# Patient Record
Sex: MALE | Race: WHITE | NOT HISPANIC OR LATINO
[De-identification: names, ages, dates, MRNs, and addresses within clinical notes are randomized per-mention and may not be internally consistent; named-entity substitution may affect disease eponyms.]

---

## 2017-06-01 ENCOUNTER — APPOINTMENT (OUTPATIENT)
Dept: SURGERY | Facility: CLINIC | Age: 62
End: 2017-06-01

## 2017-06-01 VITALS
HEIGHT: 69 IN | DIASTOLIC BLOOD PRESSURE: 72 MMHG | BODY MASS INDEX: 30.66 KG/M2 | SYSTOLIC BLOOD PRESSURE: 120 MMHG | HEART RATE: 94 BPM | WEIGHT: 207 LBS

## 2017-06-01 DIAGNOSIS — Z86.79 PERSONAL HISTORY OF OTHER DISEASES OF THE CIRCULATORY SYSTEM: ICD-10-CM

## 2017-06-01 DIAGNOSIS — Z78.9 OTHER SPECIFIED HEALTH STATUS: ICD-10-CM

## 2017-06-01 DIAGNOSIS — Z86.39 PERSONAL HISTORY OF OTHER ENDOCRINE, NUTRITIONAL AND METABOLIC DISEASE: ICD-10-CM

## 2017-06-01 DIAGNOSIS — I70.90 UNSPECIFIED ATHEROSCLEROSIS: ICD-10-CM

## 2017-06-01 PROBLEM — K57.90 DIVERTICULOSIS: Status: RESOLVED | Noted: 2017-06-01 | Resolved: 2017-06-01

## 2017-06-01 PROBLEM — Z86.69 HISTORY OF SCIATICA: Status: RESOLVED | Noted: 2017-06-01 | Resolved: 2017-06-01

## 2017-06-01 PROBLEM — Z87.19 HISTORY OF ESOPHAGEAL REFLUX: Status: RESOLVED | Noted: 2017-06-01 | Resolved: 2017-06-01

## 2017-06-01 PROBLEM — Z87.898 HISTORY OF MULTIPLE PULMONARY NODULES: Status: RESOLVED | Noted: 2017-06-01 | Resolved: 2017-06-01

## 2017-06-01 PROBLEM — I25.10 CAD (CORONARY ARTERY DISEASE): Status: RESOLVED | Noted: 2017-06-01 | Resolved: 2017-06-01

## 2017-06-01 PROBLEM — I25.10 ASHD (ARTERIOSCLEROTIC HEART DISEASE): Status: RESOLVED | Noted: 2017-06-01 | Resolved: 2017-06-01

## 2017-06-01 PROBLEM — I71.2 THORACIC AORTIC ANEURYSM: Status: RESOLVED | Noted: 2017-06-01 | Resolved: 2017-06-01

## 2017-06-01 RX ORDER — CLOPIDOGREL 75 MG/1
75 TABLET, FILM COATED ORAL
Refills: 0 | Status: ACTIVE | COMMUNITY

## 2017-06-02 ENCOUNTER — APPOINTMENT (OUTPATIENT)
Dept: CARDIOTHORACIC SURGERY | Facility: CLINIC | Age: 62
End: 2017-06-02

## 2017-06-02 VITALS
SYSTOLIC BLOOD PRESSURE: 130 MMHG | HEART RATE: 87 BPM | HEIGHT: 69 IN | TEMPERATURE: 98.4 F | OXYGEN SATURATION: 96 % | DIASTOLIC BLOOD PRESSURE: 77 MMHG | BODY MASS INDEX: 30.66 KG/M2 | RESPIRATION RATE: 15 BRPM | WEIGHT: 207 LBS

## 2017-06-02 VITALS — SYSTOLIC BLOOD PRESSURE: 118 MMHG | DIASTOLIC BLOOD PRESSURE: 70 MMHG

## 2017-06-02 DIAGNOSIS — K57.90 DIVERTICULOSIS OF INTESTINE, PART UNSPECIFIED, W/OUT PERFORATION OR ABSCESS W/OUT BLEEDING: ICD-10-CM

## 2017-06-02 DIAGNOSIS — Z82.49 FAMILY HISTORY OF ISCHEMIC HEART DISEASE AND OTHER DISEASES OF THE CIRCULATORY SYSTEM: ICD-10-CM

## 2017-06-02 DIAGNOSIS — I71.2 THORACIC AORTIC ANEURYSM, W/OUT RUPTURE: ICD-10-CM

## 2017-06-02 DIAGNOSIS — Z87.19 PERSONAL HISTORY OF OTHER DISEASES OF THE DIGESTIVE SYSTEM: ICD-10-CM

## 2017-06-02 DIAGNOSIS — Z87.891 PERSONAL HISTORY OF NICOTINE DEPENDENCE: ICD-10-CM

## 2017-06-02 DIAGNOSIS — I25.10 ATHEROSCLEROTIC HEART DISEASE OF NATIVE CORONARY ARTERY W/OUT ANGINA PECTORIS: ICD-10-CM

## 2017-06-02 DIAGNOSIS — Z87.898 PERSONAL HISTORY OF OTHER SPECIFIED CONDITIONS: ICD-10-CM

## 2017-06-02 DIAGNOSIS — Z86.69 PERSONAL HISTORY OF OTHER DISEASES OF THE NERVOUS SYSTEM AND SENSE ORGANS: ICD-10-CM

## 2017-06-02 LAB
24R-OH-CALCIDIOL SERPL-MCNC: 56.6 PG/ML
CALCIUM SERPL-MCNC: 9.7 MG/DL
CALCIUM SERPL-MCNC: 9.7 MG/DL
PARATHYROID HORMONE INTACT: 24 PG/ML

## 2017-06-02 RX ORDER — RAMIPRIL 5 MG/1
5 CAPSULE ORAL DAILY
Refills: 0 | Status: ACTIVE | COMMUNITY

## 2017-06-02 RX ORDER — ATENOLOL 25 MG/1
25 TABLET ORAL DAILY
Refills: 0 | Status: ACTIVE | COMMUNITY

## 2017-06-02 RX ORDER — ASPIRIN 325 MG/1
TABLET, FILM COATED ORAL
Refills: 0 | Status: DISCONTINUED | COMMUNITY
End: 2017-06-02

## 2017-06-02 RX ORDER — ROSUVASTATIN CALCIUM 5 MG/1
TABLET, FILM COATED ORAL AT BEDTIME
Refills: 0 | Status: ACTIVE | COMMUNITY

## 2017-06-02 RX ORDER — METFORMIN HYDROCHLORIDE 1000 MG/1
1000 TABLET, COATED ORAL DAILY
Refills: 0 | Status: ACTIVE | COMMUNITY

## 2017-06-02 RX ORDER — MULTIVITAMIN
TABLET ORAL
Refills: 0 | Status: ACTIVE | COMMUNITY

## 2017-06-02 RX ORDER — COLESEVELAM HYDROCHLORIDE 625 MG/1
625 TABLET, FILM COATED ORAL TWICE DAILY
Refills: 0 | Status: ACTIVE | COMMUNITY

## 2017-06-09 ENCOUNTER — RESULT REVIEW (OUTPATIENT)
Age: 62
End: 2017-06-09

## 2017-12-05 ENCOUNTER — APPOINTMENT (OUTPATIENT)
Dept: SURGERY | Facility: CLINIC | Age: 62
End: 2017-12-05
Payer: COMMERCIAL

## 2017-12-05 PROCEDURE — 99213 OFFICE O/P EST LOW 20 MIN: CPT

## 2018-06-06 ENCOUNTER — APPOINTMENT (OUTPATIENT)
Dept: CT IMAGING | Facility: CLINIC | Age: 63
End: 2018-06-06

## 2018-06-06 ENCOUNTER — OUTPATIENT (OUTPATIENT)
Dept: OUTPATIENT SERVICES | Facility: HOSPITAL | Age: 63
LOS: 1 days | End: 2018-06-06
Payer: COMMERCIAL

## 2018-06-06 DIAGNOSIS — I71.2 THORACIC AORTIC ANEURYSM, WITHOUT RUPTURE: ICD-10-CM

## 2018-06-06 PROCEDURE — 82565 ASSAY OF CREATININE: CPT

## 2018-06-06 PROCEDURE — 71275 CT ANGIOGRAPHY CHEST: CPT

## 2018-06-06 PROCEDURE — 74174 CTA ABD&PLVS W/CONTRAST: CPT

## 2018-06-06 PROCEDURE — 71275 CT ANGIOGRAPHY CHEST: CPT | Mod: 26

## 2018-06-06 PROCEDURE — 74174 CTA ABD&PLVS W/CONTRAST: CPT | Mod: 26

## 2018-06-08 ENCOUNTER — APPOINTMENT (OUTPATIENT)
Dept: CARDIOTHORACIC SURGERY | Facility: CLINIC | Age: 63
End: 2018-06-08
Payer: COMMERCIAL

## 2018-06-08 VITALS
HEART RATE: 77 BPM | TEMPERATURE: 98.3 F | WEIGHT: 215 LBS | OXYGEN SATURATION: 94 % | DIASTOLIC BLOOD PRESSURE: 81 MMHG | HEIGHT: 69 IN | RESPIRATION RATE: 15 BRPM | SYSTOLIC BLOOD PRESSURE: 135 MMHG | BODY MASS INDEX: 31.84 KG/M2

## 2018-06-08 DIAGNOSIS — I71.2 THORACIC AORTIC ANEURYSM, W/OUT RUPTURE: ICD-10-CM

## 2018-06-08 PROCEDURE — 99214 OFFICE O/P EST MOD 30 MIN: CPT

## 2018-06-08 RX ORDER — BRIMONIDINE TARTRATE 1 MG/ML
0.1 SOLUTION/ DROPS OPHTHALMIC
Qty: 20 | Refills: 0 | Status: ACTIVE | COMMUNITY
Start: 2018-01-20

## 2018-06-08 RX ORDER — OXYCODONE HYDROCHLORIDE AND ACETAMINOPHEN 10; 325 MG/1; MG/1
10-325 TABLET ORAL EVERY 4 HOURS
Refills: 0 | Status: DISCONTINUED | COMMUNITY
End: 2018-06-08

## 2018-06-08 RX ORDER — METFORMIN ER 500 MG 500 MG/1
500 TABLET ORAL
Qty: 180 | Refills: 0 | Status: DISCONTINUED | COMMUNITY
Start: 2018-04-01

## 2018-06-08 RX ORDER — GLUC/MSM/COLGN2/HYAL/ANTIARTH3 375-375-20
TABLET ORAL TWICE DAILY
Refills: 0 | Status: DISCONTINUED | COMMUNITY
End: 2018-06-08

## 2018-12-18 ENCOUNTER — APPOINTMENT (OUTPATIENT)
Dept: SURGERY | Facility: CLINIC | Age: 63
End: 2018-12-18
Payer: COMMERCIAL

## 2018-12-18 DIAGNOSIS — E04.1 NONTOXIC SINGLE THYROID NODULE: ICD-10-CM

## 2018-12-18 PROCEDURE — 99213 OFFICE O/P EST LOW 20 MIN: CPT

## 2018-12-18 PROCEDURE — 36415 COLL VENOUS BLD VENIPUNCTURE: CPT

## 2018-12-19 LAB
T3 SERPL-MCNC: 127 NG/DL
T4 FREE SERPL-MCNC: 1.4 NG/DL
THYROGLOB AB SERPL-ACNC: <20 IU/ML
THYROPEROXIDASE AB SERPL IA-ACNC: <10 IU/ML
TSH SERPL-ACNC: 0.94 UIU/ML

## 2019-01-08 ENCOUNTER — OTHER (OUTPATIENT)
Age: 64
End: 2019-01-08

## 2019-05-02 ENCOUNTER — APPOINTMENT (OUTPATIENT)
Dept: ORTHOPEDIC SURGERY | Facility: CLINIC | Age: 64
End: 2019-05-02
Payer: COMMERCIAL

## 2019-05-02 VITALS
HEIGHT: 69 IN | DIASTOLIC BLOOD PRESSURE: 75 MMHG | SYSTOLIC BLOOD PRESSURE: 143 MMHG | WEIGHT: 234 LBS | HEART RATE: 86 BPM | BODY MASS INDEX: 34.66 KG/M2

## 2019-05-02 PROCEDURE — 99203 OFFICE O/P NEW LOW 30 MIN: CPT | Mod: 25

## 2019-05-02 PROCEDURE — 73130 X-RAY EXAM OF HAND: CPT | Mod: 50

## 2019-05-02 PROCEDURE — 20600 DRAIN/INJ JOINT/BURSA W/O US: CPT | Mod: FA

## 2019-05-02 RX ORDER — ASPIRIN 325 MG/1
325 TABLET, FILM COATED ORAL TWICE DAILY
Refills: 0 | Status: DISCONTINUED | COMMUNITY
End: 2019-05-02

## 2019-05-02 RX ORDER — UBIDECARENONE/VIT E ACET 100MG-5
CAPSULE ORAL
Refills: 0 | Status: ACTIVE | COMMUNITY

## 2019-05-02 RX ORDER — ACETAMINOPHEN 500 MG/1
500 TABLET ORAL
Refills: 0 | Status: ACTIVE | COMMUNITY

## 2019-05-02 RX ORDER — ASPIRIN 81 MG
81 TABLET, DELAYED RELEASE (ENTERIC COATED) ORAL
Refills: 0 | Status: ACTIVE | COMMUNITY

## 2019-05-02 RX ORDER — FAMOTIDINE 10 MG/1
TABLET, FILM COATED ORAL TWICE DAILY
Refills: 0 | Status: DISCONTINUED | COMMUNITY
End: 2019-05-02

## 2019-05-02 NOTE — DISCUSSION/SUMMARY
[FreeTextEntry1] : He has findings consistent with Bilateral hand pain, secondary to moderate basal joint arthritis.\par \par I had a discussion regarding today's visit, the diagnosis, and treatment options / recommendations.  At this time, he agreed to proceed with a cortisone injection on the left side.  He does understand that this may not provide her with long-term relief.  In addition, he understands that this will likely elevate his blood sugars in the short term.\par \par The patient has agreed to this plan of management and has expressed full understanding.  All questions were fully answered to the patient's satisfaction.\par \par Over 50% of the time spent with the patient was on counseling the patient on the above diagnosis, treatment plan and prognosis.

## 2019-05-02 NOTE — PROCEDURE
[FreeTextEntry1] :  - After a discussion of risks and benefits, the patient agreed to proceed with a cortisone injection.  \par -  Side Injected: Left thumb CMC joint.\par -  Medications injected: 0.5cc of 1% Lidocaine and 1cc of 40mg of Depomedrol, using sterile technique.\par -  Patient tolerated procedure well, without complications.\par -  Patient noted immediate relief of the symptoms, secondary to the anesthetic effects of the injection.\par -  Patient was told that the pain may worsen for a day or two, and should then begin to improve.\par -  Instructions: The patient was instructed on the use of ice, anti-inflammatory agents, or Tylenol, and activity modification.\par -  Follow-up: Within 2-3 weeks to assess the response to the injection.\par

## 2019-05-02 NOTE — HISTORY OF PRESENT ILLNESS
[Right] : right hand dominant [FreeTextEntry1] : He comes in today for evaluation of bilateral hand pain.  He has had symptoms for several years.  He is most symptomatic at the base of the thumbs, more so on the right side.  He is recovering from bilateral total knee replacements.\par \par - He was referred by his primary care physician, Dr. Kory Lynn\par \par - He has a past medical history, which includes cardiac disease, and type 2 diabetes.  He takes a number of medications including Plavix, and aspirin, and metformin.

## 2019-05-02 NOTE — CONSULT LETTER
[Dear  ___] : Dear  [unfilled], [Consult Letter:] : I had the pleasure of evaluating your patient, [unfilled]. [Please see my note below.] : Please see my note below. [Consult Closing:] : Thank you very much for allowing me to participate in the care of this patient.  If you have any questions, please do not hesitate to contact me. [Sincerely,] : Sincerely, [FreeTextEntry3] : Amado Cannon M.D.\par Surgery of the Hand & Upper Extremity\par Orthopaedic Surgery\par Chief, Hand Service, [employer]\par Director, Hand Service, [facility]\par  of Orthopedic Surgery, [facility] at hospitals/[facility] \par [facility]\par \par Email: [email or web address] Office Phone: [phone]\par \par

## 2019-05-02 NOTE — PHYSICAL EXAM
[de-identified] : - Constitutional: This is a male, in no obvious distress.  \par - Psych: Patient is alert and oriented to person, place and time.  Patient has a normal mood and affect.\par - Cardiovascular: Normal pulses throughout the upper extremities.  \par - Neuro: Strength and sensation are intact throughout the upper extremities.  Patient has normal coordination.\par - Skin: No rashes, lesions, or other abnormalities are noted in the upper extremities.\par \par ---\par \par Examination of both hands demonstrates tenderness along the CMC joints of both thumbs.  There is mild swelling.  There is no instability.  There is no evidence of a trigger finger or de Quervain's tendinitis.  Provocative signs for carpal tunnel syndrome are negative.  He is neurovascularly intact distally. [de-identified] : \par AP, lateral, and oblique radiographs of both hands demonstrate moderate left greater than right thumb basal joint arthritis.

## 2019-05-16 ENCOUNTER — TRANSCRIPTION ENCOUNTER (OUTPATIENT)
Age: 64
End: 2019-05-16

## 2019-05-22 PROBLEM — M18.11 PRIMARY OSTEOARTHRITIS OF FIRST CARPOMETACARPAL JOINT OF RIGHT HAND: Status: ACTIVE | Noted: 2019-05-02

## 2019-05-22 PROBLEM — M18.12 PRIMARY OSTEOARTHRITIS OF FIRST CARPOMETACARPAL JOINT OF LEFT HAND: Status: ACTIVE | Noted: 2019-05-02

## 2019-05-24 ENCOUNTER — APPOINTMENT (OUTPATIENT)
Dept: ORTHOPEDIC SURGERY | Facility: CLINIC | Age: 64
End: 2019-05-24
Payer: COMMERCIAL

## 2019-05-24 VITALS — BODY MASS INDEX: 34.66 KG/M2 | HEIGHT: 69 IN | WEIGHT: 234 LBS

## 2019-05-24 DIAGNOSIS — M18.12 UNILATERAL PRIMARY OSTEOARTHRITIS OF FIRST CARPOMETACARPAL JOINT, LEFT HAND: ICD-10-CM

## 2019-05-24 DIAGNOSIS — M18.11 UNILATERAL PRIMARY OSTEOARTHRITIS OF FIRST CARPOMETACARPAL JOINT, RIGHT HAND: ICD-10-CM

## 2019-05-24 PROCEDURE — 99213 OFFICE O/P EST LOW 20 MIN: CPT

## 2019-05-24 NOTE — HISTORY OF PRESENT ILLNESS
[FreeTextEntry1] : Followup regarding bilateral hand pain, secondary to moderate basal joint arthritis.  He was seen in the office, 22 days ago, and given a cortisone injection on the more symptomatic left side.\par \par - His left hand is much improved.  Although he does have some pain at the right hand, it is now relatively mild.\par \par - He has a past medical history, which includes cardiac disease, and type 2 diabetes.  He takes a number of medications including Plavix, and aspirin, and metformin.

## 2019-05-24 NOTE — DISCUSSION/SUMMARY
[FreeTextEntry1] : Further treatment options / recommendations were discussed.  At this time, as he is doing fairly well, I have recommended observation.  His right thumb is relatively mild, so I did not recommend an injection on that side.  He will followup accordingly to his symptoms.\par \par I had a discussion regarding today's visit, the diagnosis, and my treatment recommendations.  The patient has agreed to this plan of management and has expressed full understanding.  All questions were fully answered to the patient's satisfaction.\par \par I spent at least 25 minutes of face-to-face time with the patient.  Over 50% of this time was spent on counseling the patient on the above diagnosis, treatment plan and prognosis.

## 2019-05-24 NOTE — PHYSICAL EXAM
[de-identified] : \par Previous AP, lateral, and oblique radiographs of both hands demonstrated moderate left greater than right thumb basal joint arthritis. [de-identified] : - Constitutional: This is a male, in no obvious distress.  \par - Psych: Patient is alert and oriented to person, place and time.  Patient has a normal mood and affect.\par - Cardiovascular: Normal pulses throughout the upper extremities.  \par - Neuro: Strength and sensation are intact throughout the upper extremities.  Patient has normal coordination.\par - Skin: No rashes, lesions, or other abnormalities are noted in the upper extremities.\par \par ---\par \par Examination of his left hand demonstrates decreased swelling and tenderness along the CMC joint.  He has improved motion.  Examination of his right hand demonstrates mild tenderness and swelling along the CMC joint, which is relatively mild at this time.  He remains neurovascularly intact distally.

## 2019-07-22 ENCOUNTER — OUTPATIENT (OUTPATIENT)
Dept: OUTPATIENT SERVICES | Facility: HOSPITAL | Age: 64
LOS: 1 days | Discharge: HOME | End: 2019-07-22

## 2019-07-22 DIAGNOSIS — D75.89 OTHER SPECIFIED DISEASES OF BLOOD AND BLOOD-FORMING ORGANS: ICD-10-CM

## 2019-07-29 ENCOUNTER — RESULT REVIEW (OUTPATIENT)
Age: 64
End: 2019-07-29

## 2019-07-29 ENCOUNTER — APPOINTMENT (OUTPATIENT)
Dept: HEMATOLOGY ONCOLOGY | Facility: CLINIC | Age: 64
End: 2019-07-29
Payer: COMMERCIAL

## 2019-07-29 VITALS
OXYGEN SATURATION: 95 % | HEIGHT: 69.69 IN | WEIGHT: 224.03 LBS | TEMPERATURE: 98 F | BODY MASS INDEX: 32.44 KG/M2 | DIASTOLIC BLOOD PRESSURE: 82 MMHG | HEART RATE: 83 BPM | SYSTOLIC BLOOD PRESSURE: 154 MMHG

## 2019-07-29 DIAGNOSIS — D75.89 OTHER SPECIFIED DISEASES OF BLOOD AND BLOOD-FORMING ORGANS: ICD-10-CM

## 2019-07-29 LAB
BASOPHILS # BLD AUTO: 0.1 K/UL — SIGNIFICANT CHANGE UP (ref 0–0.2)
BASOPHILS NFR BLD AUTO: 1 % — SIGNIFICANT CHANGE UP (ref 0–2)
EOSINOPHIL # BLD AUTO: 0.1 K/UL — SIGNIFICANT CHANGE UP (ref 0–0.5)
EOSINOPHIL NFR BLD AUTO: 1.6 % — SIGNIFICANT CHANGE UP (ref 0–6)
HCT VFR BLD CALC: 42.5 % — SIGNIFICANT CHANGE UP (ref 39–50)
HGB BLD-MCNC: 14.3 G/DL — SIGNIFICANT CHANGE UP (ref 13–17)
LYMPHOCYTES # BLD AUTO: 1.5 K/UL — SIGNIFICANT CHANGE UP (ref 1–3.3)
LYMPHOCYTES # BLD AUTO: 24.8 % — SIGNIFICANT CHANGE UP (ref 13–44)
MCHC RBC-ENTMCNC: 32 PG — SIGNIFICANT CHANGE UP (ref 27–34)
MCHC RBC-ENTMCNC: 33.7 G/DL — SIGNIFICANT CHANGE UP (ref 32–36)
MCV RBC AUTO: 94.8 FL — SIGNIFICANT CHANGE UP (ref 80–100)
MONOCYTES # BLD AUTO: 0.5 K/UL — SIGNIFICANT CHANGE UP (ref 0–0.9)
MONOCYTES NFR BLD AUTO: 9.2 % — SIGNIFICANT CHANGE UP (ref 2–14)
NEUTROPHILS # BLD AUTO: 3.8 K/UL — SIGNIFICANT CHANGE UP (ref 1.8–7.4)
NEUTROPHILS NFR BLD AUTO: 63.4 % — SIGNIFICANT CHANGE UP (ref 43–77)
PLATELET # BLD AUTO: 255 K/UL — SIGNIFICANT CHANGE UP (ref 150–400)
RBC # BLD: 4.48 M/UL — SIGNIFICANT CHANGE UP (ref 4.2–5.8)
RBC # FLD: 11.2 % — SIGNIFICANT CHANGE UP (ref 10.3–14.5)
WBC # BLD: 5.9 K/UL — SIGNIFICANT CHANGE UP (ref 3.8–10.5)
WBC # FLD AUTO: 5.9 K/UL — SIGNIFICANT CHANGE UP (ref 3.8–10.5)

## 2019-07-29 PROCEDURE — 99204 OFFICE O/P NEW MOD 45 MIN: CPT

## 2019-07-29 NOTE — CONSULT LETTER
[Dear  ___] : Dear  [unfilled], [Consult Letter:] : I had the pleasure of evaluating your patient, [unfilled]. [Please see my note below.] : Please see my note below. [Consult Closing:] : Thank you very much for allowing me to participate in the care of this patient.  If you have any questions, please do not hesitate to contact me. [Sincerely,] : Sincerely, [FreeTextEntry3] : Darnell Lee MD\par Medical Oncology/Hematology\par [facility], [facility]\par \par \par [facility] at [facility]\par

## 2019-07-29 NOTE — ASSESSMENT
[FreeTextEntry1] : 63 year old gentleman with history of intermittent macrocytosis (range 100-106) dating back to at least 2017 per records scanned in the system.  His identical twin brother has MDS.  We discussed etiologies of macrocytosis to include nutritional deficiencies, drug/meds, liver disease, MDS to name a few.  \par Patient's B12 is 514, and folate is >20.  His CBC reveals normal WBC 5.9, Hgb 14.3, platelets 255.  He has no evidence of cytopenias.  MCV today is 94.  I see no indication for further work up given no progressive cytopenias or symptoms.  Should he develop cytopenias, a bone marrow biopsy would be warranted.\par \par He is relocating to [place] and will establish care there.

## 2019-07-29 NOTE — PHYSICAL EXAM
[Fully active, able to carry on all pre-disease performance without restriction] : Status 0 - Fully active, able to carry on all pre-disease performance without restriction [Normal] : grossly intact [de-identified] : S [de-identified] : no edema [de-identified] : soft, non tender, non distended

## 2019-07-29 NOTE — HISTORY OF PRESENT ILLNESS
[de-identified] : MR. LYLE DELONG is being seen for macrocytosis.\par \par His twin brother has MDS and has been in hospital for 10 days.  He was recommended to get blood work done by PCP.\par He has a h/o ASHD, Type 2 DM, arthritis, GERD, dyslipidemia, with intermittent macrocytosis. Twin brother recently diagnosed with myelodysplasia.  \par Former smoker, quit over 40 years ago. No fever or night sweats. No N/V/D. No chest pain or shortness of breath. No headaches or dizziness. No recurrent infections. No other complaints today.\par \par 7/29/19: \par WBC 5.9 K/uL, MCV: 94.8 fl\par \par 6/26/19: WBC: 6.47 K/uL, MCV: 106.3 fl

## 2019-10-31 ENCOUNTER — OTHER (OUTPATIENT)
Age: 64
End: 2019-10-31

## 2019-12-03 ENCOUNTER — OFFICE VISIT (OUTPATIENT)
Dept: FAMILY MEDICINE CLINIC | Age: 64
End: 2019-12-03

## 2019-12-03 VITALS
HEART RATE: 76 BPM | WEIGHT: 230.6 LBS | RESPIRATION RATE: 18 BRPM | HEIGHT: 69 IN | DIASTOLIC BLOOD PRESSURE: 82 MMHG | BODY MASS INDEX: 34.16 KG/M2 | TEMPERATURE: 97.9 F | OXYGEN SATURATION: 98 % | SYSTOLIC BLOOD PRESSURE: 129 MMHG

## 2019-12-03 DIAGNOSIS — I10 ESSENTIAL HYPERTENSION: ICD-10-CM

## 2019-12-03 DIAGNOSIS — M25.562 CHRONIC PAIN OF BOTH KNEES: ICD-10-CM

## 2019-12-03 DIAGNOSIS — Z00.00 WELCOME TO MEDICARE PREVENTIVE VISIT: ICD-10-CM

## 2019-12-03 DIAGNOSIS — G89.29 CHRONIC PAIN OF BOTH KNEES: ICD-10-CM

## 2019-12-03 DIAGNOSIS — M54.50 CHRONIC MIDLINE LOW BACK PAIN, UNSPECIFIED WHETHER SCIATICA PRESENT: ICD-10-CM

## 2019-12-03 DIAGNOSIS — M25.561 CHRONIC PAIN OF BOTH KNEES: ICD-10-CM

## 2019-12-03 DIAGNOSIS — G89.29 CHRONIC MIDLINE LOW BACK PAIN, UNSPECIFIED WHETHER SCIATICA PRESENT: ICD-10-CM

## 2019-12-03 DIAGNOSIS — E11.65 TYPE 2 DIABETES MELLITUS WITH HYPERGLYCEMIA, WITHOUT LONG-TERM CURRENT USE OF INSULIN (HCC): ICD-10-CM

## 2019-12-03 DIAGNOSIS — I25.10 CORONARY ARTERY DISEASE DUE TO LIPID RICH PLAQUE: Primary | ICD-10-CM

## 2019-12-03 DIAGNOSIS — I25.83 CORONARY ARTERY DISEASE DUE TO LIPID RICH PLAQUE: Primary | ICD-10-CM

## 2019-12-03 DIAGNOSIS — Z71.89 ACP (ADVANCE CARE PLANNING): ICD-10-CM

## 2019-12-03 DIAGNOSIS — E78.5 DYSLIPIDEMIA: ICD-10-CM

## 2019-12-03 DIAGNOSIS — G62.9 NEUROPATHY: ICD-10-CM

## 2019-12-03 RX ORDER — METFORMIN HYDROCHLORIDE 500 MG/1
TABLET ORAL 2 TIMES DAILY WITH MEALS
COMMUNITY
End: 2019-12-03 | Stop reason: SDUPTHER

## 2019-12-03 RX ORDER — RAMIPRIL 5 MG/1
CAPSULE ORAL DAILY
COMMUNITY
End: 2020-01-19

## 2019-12-03 RX ORDER — ROSUVASTATIN CALCIUM 5 MG/1
TABLET, COATED ORAL
COMMUNITY
End: 2020-01-01

## 2019-12-03 RX ORDER — COLESEVELAM 180 1/1
1250 TABLET ORAL 2 TIMES DAILY WITH MEALS
COMMUNITY
End: 2020-07-16 | Stop reason: SDUPTHER

## 2019-12-03 RX ORDER — ASPIRIN 81 MG/1
TABLET ORAL DAILY
COMMUNITY
End: 2020-01-01

## 2019-12-03 RX ORDER — ATENOLOL 25 MG/1
25 TABLET ORAL DAILY
COMMUNITY
End: 2020-01-19 | Stop reason: ALTCHOICE

## 2019-12-03 RX ORDER — METFORMIN HYDROCHLORIDE 500 MG/1
1000 TABLET, EXTENDED RELEASE ORAL
Qty: 30 TAB | Refills: 0 | COMMUNITY
Start: 2019-12-03 | End: 2020-03-18

## 2019-12-03 RX ORDER — CLOPIDOGREL BISULFATE 75 MG/1
TABLET ORAL
COMMUNITY
End: 2020-01-01

## 2019-12-03 RX ORDER — CHOLECALCIFEROL (VITAMIN D3) 125 MCG
100 CAPSULE ORAL DAILY
COMMUNITY

## 2019-12-03 RX ORDER — ACETAMINOPHEN 500 MG
500 TABLET ORAL
COMMUNITY
End: 2020-01-19

## 2019-12-03 NOTE — PROGRESS NOTES
[facility]  Yara Garcia comes in to establish care. Cardiac: he has a h/o CAD and has previous coronary stent placement. Now has been scheduled to have triple bypass in 3 days. He has family h/o CAD. He is on medication taking aspirin, atenolol, ramipril, plavix, and crestor. Has had chest pain ongoing for a while. DM2: he has h/o DM2, and is on metformin. He should intensify lifestyle and dietary modification. I will check labs today. Will do HbA1c, CBC, CMP and microalbumin. Neuropathy: he has numbness and tingling of the hands and feet. There is neuropathy likely due to diabetes. Knee pain: he has bilateral knee replacement. Does get pain on and off. No knee swelling or redness. Back pain: patient has low back pain with herniated disc, has radicular symptoms. Seen in the past by the spine specialist.  Denies bladder or bowel dysfunction. Does have numbness and tingling radiating down the lower extremities. Has identical twin who passed away recently with leukemia. We will do lab work. I will let him know if his blood work is abnormal.  Patient has never had leukemia. Past Medical History  Past Medical History:   Diagnosis Date    Diabetes ([address]     Glaucoma     HTN (hypertension)     Hyperlipemia        Surgical History  Past Surgical History:   Procedure Laterality Date    HX COLONOSCOPY  7 years ago    HX KNEE REPLACEMENT  2017    rigjt and left replacement         Medications  Current Outpatient Medications   Medication Sig Dispense Refill    aspirin delayed-release 81 mg tablet Take  by mouth daily.  atenolol (TENORMIN) 25 mg tablet Take 25 mg by mouth daily.  ramipril (ALTACE) 5 mg capsule Take  by mouth daily.  clopidogrel (PLAVIX) 75 mg tab Take  by mouth.  rosuvastatin (CRESTOR) 5 mg tablet Take  by mouth nightly.  colesevelam (WELCHOL) 625 mg tablet Take 1,875 mg by mouth two (2) times daily (with meals).       metFORMIN (GLUCOPHAGE) 500 mg tablet Take  by mouth two (2) times daily (with meals).  co-enzyme Q-10 (CO Q-10) 100 mg capsule Take 100 mg by mouth daily.  acetaminophen (TYLENOL) 500 mg tablet Take  by mouth every six (6) hours as needed for Pain.  brimonidine (ALPHAGAN P) 0.1 % ophthalmic solution every eight (8) hours. Allergies  Allergies   Allergen Reactions    Statins-Hmg-Coa Reductase Inhibitors Other (comments)       Family History  Family History   Problem Relation Age of Onset    Heart Disease Sister     Heart Attack Brother     Cancer Brother     Anemia Brother        Social History  Social History     Socioeconomic History    Marital status:      Spouse name: Not on file    Number of children: Not on file    Years of education: Not on file    Highest education level: Not on file   Occupational History    Not on file   Social Needs    Financial resource strain: Not on file    Food insecurity:     Worry: Not on file     Inability: Not on file    Transportation needs:     Medical: Not on file     Non-medical: Not on file   Tobacco Use    Smoking status: Former Smoker    Smokeless tobacco: Never Used   Substance and Sexual Activity    Alcohol use:  Yes     Alcohol/week: 1.0 standard drinks     Types: 1 Shots of liquor per week    Drug use: Never    Sexual activity: Yes     Partners: Female   Lifestyle    Physical activity:     Days per week: Not on file     Minutes per session: Not on file    Stress: Not on file   Relationships    Social connections:     Talks on phone: Not on file     Gets together: Not on file     Attends [employer]ism service: Not on file     Active member of club or organization: Not on file     Attends meetings of clubs or organizations: Not on file     Relationship status: Not on file    Intimate partner violence:     Fear of current or ex partner: Not on file     Emotionally abused: Not on file     Physically abused: Not on file     Forced sexual activity: Not on file   Other Topics Concern    Not on file   Social History Narrative    Not on file       Review of Systems  Review of Systems - Review of all systems is negative except as noted above in the HPI. Vital Signs  Visit Vitals  /82 (BP 1 Location: Left arm, BP Patient Position: Sitting)   Pulse 76   Temp 97.9 °F (36.6 °C)   Resp 18   Ht 5' 9\" (1.753 m)   Wt 230 lb 9.6 oz (104.6 kg)   SpO2 98%   BMI 34.05 kg/m²     Physical Exam  Physical Examination: General appearance - oriented to person, place, and time, overweight, acyanotic, in no respiratory distress and well hydrated  Mental status - alert, oriented to person, place, and time, affect appropriate to mood  Neck - supple, no significant adenopathy  Lymphatics - no palpable lymphadenopathy, no hepatosplenomegaly  Chest - no tachypnea, retractions or cyanosis  Heart - S1 and S2 normal, systolic murmur 2/6 at lower left sternal border  Abdomen - no rebound tenderness noted  Back exam - limited range of motion  Neurological -numbness and tingling hands and feet  Musculoskeletal - osteoarthritic changes noted in both hands  Extremities - intact peripheral pulses    Results  No results found for this or any previous visit. ASSESSMENT and PLAN    ICD-10-CM ICD-9-CM    1. Coronary artery disease due to lipid rich plaque I25.10 414.00 aspirin delayed-release 81 mg tablet    I25.83 414.3 atenolol (TENORMIN) 25 mg tablet      clopidogrel (PLAVIX) 75 mg tab      rosuvastatin (CRESTOR) 5 mg tablet   2. Essential hypertension I10 401.9 atenolol (TENORMIN) 25 mg tablet      ramipril (ALTACE) 5 mg capsule   3. Dyslipidemia E78.5 272.4 rosuvastatin (CRESTOR) 5 mg tablet   4. Type 2 diabetes mellitus with hyperglycemia, without long-term current use of insulin (HCC) E11.65 250.00 metFORMIN ER (GLUCOPHAGE XR) 500 mg tablet     790.29    5. Welcome to Medicare preventive visit Z00.00 V70.0    6.  ACP (advance care planning) Z71.89 V65.49      lab results and schedule of future lab studies reviewed with patient  reviewed diet, exercise and weight control  cardiovascular risk and specific lipid/LDL goals reviewed  reviewed medications and side effects in detail  specific diabetic recommendations: low cholesterol diet, weight control and daily exercise discussed, home glucose monitoring emphasized, all medications, side effects and compliance discussed carefully, annual eye examinations at Ophthalmology discussed, glycohemoglobin and other lab monitoring discussed and long term diabetic complications discussed  use of aspirin to prevent MI and TIA's discussed    I have discussed the diagnosis with the patient and the intended plan of care as seen in the above orders. The patient has received an after-visit summary and questions were answered concerning future plans. I have discussed medication, side effects, and warnings with the patient in detail. The patient verbalized understanding and is in agreement with the plan of care. The patient will contact the office with any additional concerns. Reji Baer MD    PLEASE NOTE:   This document has been produced using voice recognition software.  Unrecognized errors in transcription may be present

## 2019-12-03 NOTE — PATIENT INSTRUCTIONS
Medicare Part B Preventive Services Limitations Recommendation Scheduled Bone Mass Measurement 
(age 72 & older, biennial) Requires diagnosis related to osteoporosis or estrogen deficiency. Biennial benefit unless patient has history of long-term glucocorticoid tx or baseline is needed because initial test was by other method N/a Cardiovascular Screening Blood Tests (every 5 years) Total cholesterol, HDL, Triglycerides and ECG Order blood work  as a panel if possible and  for adults with routine risk  an electrocardiogram (ECG) at intervals determined by the provider. Recent lab done last month Colorectal Cancer Screening 
-Fecal occult blood test (annual) -Flexible sigmoidoscopy (5y) 
-Screening colonoscopy (10y) -Barium Enema Colorectal cancer screening should be done for adults age 54-65 with no increased risk factors for colorectal cancer. There are a number of acceptable methods of screening for this type of cancer. Each test has its own benefits and drawbacks. Discuss with your provider what is most appropriate for you during your annual wellness visit. The different tests include: colonoscopy (considered the best screening method), a fecal occult blood test, a fecal DNA test, and sigmoidoscopy Done 7 years ago Counseling to Prevent Tobacco Use (up to 8 sessions per year) - Counseling greater than 3 and up to 10 minutes - Counseling greater than 10 minutes Patients must be asymptomatic of tobacco-related conditions to receive as preventive service N/a Diabetes Screening Tests (at least every 3 years, Medicare covers annually or at 6-month intervals for prediabetic patients) Fasting blood sugar (FBS) or glucose tolerance test (GTT) All adults age 38-68 who are overweight should have a diabetes screening test once every three years.  
-Other screening tests & preventive services for persons with diabetes include: an eye exam to screen for diabetic retinopathy, a kidney function test, a foot exam, and stricter control over your cholesterol. Due    
Diabetes Self-Management Training (DSMT) (no USPSTF recommendation) Requires referral by treating physician for patient with diabetes or renal disease. 10 hours of initial DSMT session of no less than 30 minutes each in a continuous 12-month period. 2 hours of follow-up DSMT in subsequent years. N/a Glaucoma Screening (no USPSTF recommendation) Diabetes mellitus, family history, , age 48 or over,  American, age 72 or over Due patient has Glaucoma Human Immunodeficiency Virus (HIV) Screening (annually for increased risk patients) HIV-1 and HIV-2 by EIA, MELLY, rapid antibody test, or oral mucosa transudate Patient must be at increased risk for HIV infection per USPSTF guidelines or pregnant. Tests covered annually for patients at increased risk. Pregnant patients may receive up to 3 test during pregnancy. N/a Medical Nutrition Therapy (MNT) (for diabetes or renal disease not recommended schedule) Requires referral by treating physician for patient with diabetes or renal disease. Can be provided in same year as diabetes self-management training (DSMT), and CMS recommends medical nutrition therapy take place after DSMT. Up to 3 hours for initial year and 2 hours in subsequent years. N/a Prostate Cancer Screening (annually up to age 76) - Digital rectal exam (AFSHAN) - Prostate specific antigen (PSA) Annually (age 48 or over), AFSHAN not paid separately when covered E/M service is provided on same date Men up to age 76 may need a screening blood test for prostate cancer at certain intervals, depending on their personal and family history. This decision is between the patient and his provider. Due Seasonal Influenza Vaccination (annually) All adults should have a flu vaccine yearly  Done [facility] 10/2019 Pneumococcal Vaccination (once after 72) All adults  over age 72 should receive the recommended pneumonia vaccines. Current USPSTF guidelines recommend a series of two vaccines for the best pneumonia protection. DUE Hepatitis B Vaccinations (if medium/high risk) Medium/high risk factors:  End-stage renal disease, Hemophiliacs who received Factor VIII or IX concentrates, Clients of institutions for the mentally retarded, Persons who live in the same house as a HepB virus carrier, Homosexual men, Illicit injectable drug abusers. N/A Shingles Vaccination A shingles vaccine is also recommended once in a lifetime after age 61 DUE Ultrasound Screening for Abdominal Aortic Aneurysm (AAA) (once) An Abdominal Aortic Aneurysm (AAA) Screening is recommended for men age 73-68 who has ever smoked in their lifetime. of the following criteria: 
- Men who are 73-68 years old and have smoked more than 100 cigarettes in their lifetime. 
-Anyone with a FH of AAA 
-Anyone recommended for screening by USPSTF N/A Hep C All adults born between [birth year] and 1965 should be screened once for Hepatitis C DUE Tetanus  All adults should have a tetanus vaccine every 10 years DUE

## 2019-12-03 NOTE — PROGRESS NOTES
Chief Complaint   Patient presents with   [facility] Annual Wellness Visit     1. Have you been to the ER, urgent care clinic since your last visit? Hospitalized since your last visit? No    2. Have you seen or consulted any other health care providers outside of the [address] [place] [address] since your last visit? Include any pap smears or colon screening. No    This is a \"Welcome to [employer]"  Initial Preventive Physical Examination (IPPE) providing Personalized Prevention Plan Services (Performed in the first 12 months of enrollment)    I have reviewed the patient's medical history in detail and updated the computerized patient record. History   Teri Horton comes in for welcome to Medicare exam.    Past Medical History:   Diagnosis Date    Diabetes ([address]     Glaucoma     HTN (hypertension)     Hyperlipemia       Past Surgical History:   Procedure Laterality Date    HX COLONOSCOPY  7 years ago    HX KNEE REPLACEMENT  2017    rigjt and left replacement      Current Outpatient Medications   Medication Sig Dispense Refill    aspirin delayed-release 81 mg tablet Take  by mouth daily.  atenolol (TENORMIN) 25 mg tablet Take 25 mg by mouth daily.  ramipril (ALTACE) 5 mg capsule Take  by mouth daily.  clopidogrel (PLAVIX) 75 mg tab Take  by mouth.  rosuvastatin (CRESTOR) 5 mg tablet Take  by mouth nightly.  colesevelam (WELCHOL) 625 mg tablet Take 1,875 mg by mouth two (2) times daily (with meals).  co-enzyme Q-10 (CO Q-10) 100 mg capsule Take 100 mg by mouth daily.  acetaminophen (TYLENOL) 500 mg tablet Take  by mouth every six (6) hours as needed for Pain.  brimonidine (ALPHAGAN P) 0.1 % ophthalmic solution every eight (8) hours.  metFORMIN ER (GLUCOPHAGE XR) 500 mg tablet Take 2 Tabs by mouth daily (with dinner).  30 Tab 0     Allergies   Allergen Reactions    Statins-Hmg-Coa Reductase Inhibitors Other (comments)       Family History   Problem Relation Age of Onset    Heart Disease Sister     Heart Attack Brother     Cancer Brother     Anemia Brother      Social History     Tobacco Use    Smoking status: Former Smoker    Smokeless tobacco: Never Used   Substance Use Topics    Alcohol use: Yes     Alcohol/week: 1.0 standard drinks     Types: 1 Shots of liquor per week       Depression Risk Screen     3 most recent PHQ Screens 12/3/2019   Little interest or pleasure in doing things Not at all   Feeling down, depressed, irritable, or hopeless Not at all   Total Score PHQ 2 0       Alcohol Risk Factor Screening (MALE > 65): Do you average more 1 drink per night or more than 7 drinks a week: No    In the past three months have you have had more than 4 drinks containing alcohol on one occasion: No      Functional Ability and Level of Safety   1. Was the patient's timed Up and GO test unsteady or longer than 30 seconds? No  2. Does the patient need help with the phone, transportation, shopping, preparing meals, housework, laundry, medications or managing money? No  3. Does the patients' home have rugs in the hallway, lack grab bars in the bathroom, lack handrails on the stairs or have poor lighting? No  4. Have you noticed any hearing difficulties? No  Hearing Evaluation:    Diet: No special diet    Hearing: Hearing is good. Vision Screening:  Vision is good. Visual Acuity Screening    Right eye Left eye Both eyes   Without correction: 20/25 20/25 20/20   With correction:            Activities of Daily Living: The home contains: no safety equipment. Patient does total self care    Ambulation: with no difficulty    Exercise level: sedentary    Fall Risk Screen:  Fall Risk Assessment, last 12 mths 12/3/2019   Able to walk? Yes   Fall in past 12 months?  No       Abuse Screen:  Patient is not abused    Screening EKG   EKG order placed: No    Patient Care Team   Patient Care Team:  Lionel Schneider MD as PCP - General (Family Practice)  Willi Juárez, DO (Cardiology)     End of Life Planning   Advanced care planning directives were discussed with the patient and /or family/caregiver. Assessment/Plan   Education and counseling provided:  End-of-Life planning (with patient's consent)    1. Welcome to Medicare preventive visit    2. ACP (advance care planning)    Health Maintenance Due   Topic Date Due    Hepatitis C Screening  1955    DTaP/Tdap/Td series (1 - Tdap) 10/24/1965    Shingrix Vaccine Age 50> (1 of 2) 10/24/2004    FOBT Q 1 YEAR AGE 50-75  10/24/2004    Influenza Age 9 to Adult  08/01/2019    GLAUCOMA SCREENING Q2Y  10/24/2019    Pneumococcal 65+ years (1 of 1 - PPSV23) 10/24/2019     I have discussed the diagnosis with the patient and the intended plan of care as seen in the above orders. The patient has received an after-visit summary and questions were answered concerning future plans. I have discussed medication, side effects, and warnings with the patient in detail. The patient verbalized understanding and is in agreement with the plan of care. The patient will contact the office with any additional concerns. Personalized preventative plan of care was discussed, printed and given to patient.     Lulú Pizano MD

## 2019-12-31 ENCOUNTER — HOME HEALTH ADMISSION (OUTPATIENT)
Dept: HOME HEALTH SERVICES | Facility: HOME HEALTH | Age: 64
End: 2019-12-31
Payer: MEDICARE

## 2020-01-01 ENCOUNTER — HOME CARE VISIT (OUTPATIENT)
Dept: SCHEDULING | Facility: HOME HEALTH | Age: 65
End: 2020-01-01
Payer: MEDICARE

## 2020-01-01 VITALS
HEART RATE: 92 BPM | SYSTOLIC BLOOD PRESSURE: 110 MMHG | OXYGEN SATURATION: 97 % | TEMPERATURE: 97 F | DIASTOLIC BLOOD PRESSURE: 60 MMHG | BODY MASS INDEX: 31.1 KG/M2 | HEIGHT: 69 IN | RESPIRATION RATE: 16 BRPM | WEIGHT: 210 LBS

## 2020-01-01 PROCEDURE — G0299 HHS/HOSPICE OF RN EA 15 MIN: HCPCS

## 2020-01-01 PROCEDURE — 400013 HH SOC

## 2020-01-02 ENCOUNTER — HOME CARE VISIT (OUTPATIENT)
Dept: HOME HEALTH SERVICES | Facility: HOME HEALTH | Age: 65
End: 2020-01-02
Payer: MEDICARE

## 2020-01-02 ENCOUNTER — HOME CARE VISIT (OUTPATIENT)
Dept: SCHEDULING | Facility: HOME HEALTH | Age: 65
End: 2020-01-02
Payer: MEDICARE

## 2020-01-02 VITALS
DIASTOLIC BLOOD PRESSURE: 88 MMHG | RESPIRATION RATE: 20 BRPM | HEART RATE: 93 BPM | SYSTOLIC BLOOD PRESSURE: 138 MMHG | TEMPERATURE: 98.3 F

## 2020-01-02 LAB
INR BLD: 2.8 (ref 0.9–1.1)
PT POC: 33.5 SECONDS (ref 11.8–14.9)

## 2020-01-02 PROCEDURE — G0299 HHS/HOSPICE OF RN EA 15 MIN: HCPCS

## 2020-01-03 ENCOUNTER — HOME CARE VISIT (OUTPATIENT)
Dept: HOME HEALTH SERVICES | Facility: HOME HEALTH | Age: 65
End: 2020-01-03
Payer: MEDICARE

## 2020-01-03 PROCEDURE — G0496 LPN CARE TRAIN/EDU IN HH: HCPCS

## 2020-01-06 ENCOUNTER — HOME CARE VISIT (OUTPATIENT)
Dept: HOME HEALTH SERVICES | Facility: HOME HEALTH | Age: 65
End: 2020-01-06
Payer: MEDICARE

## 2020-01-06 ENCOUNTER — HOME CARE VISIT (OUTPATIENT)
Dept: SCHEDULING | Facility: HOME HEALTH | Age: 65
End: 2020-01-06
Payer: MEDICARE

## 2020-01-06 VITALS — DIASTOLIC BLOOD PRESSURE: 78 MMHG | OXYGEN SATURATION: 95 % | SYSTOLIC BLOOD PRESSURE: 135 MMHG | HEART RATE: 99 BPM

## 2020-01-06 LAB
INR BLD: 2.4 (ref 0.9–1.1)
PT POC: 28.7 SECONDS (ref 11.8–14.9)

## 2020-01-06 PROCEDURE — G0300 HHS/HOSPICE OF LPN EA 15 MIN: HCPCS

## 2020-01-08 ENCOUNTER — HOME CARE VISIT (OUTPATIENT)
Dept: SCHEDULING | Facility: HOME HEALTH | Age: 65
End: 2020-01-08
Payer: MEDICARE

## 2020-01-08 PROCEDURE — G0300 HHS/HOSPICE OF LPN EA 15 MIN: HCPCS

## 2020-01-10 ENCOUNTER — HOME CARE VISIT (OUTPATIENT)
Dept: SCHEDULING | Facility: HOME HEALTH | Age: 65
End: 2020-01-10
Payer: MEDICARE

## 2020-01-10 PROCEDURE — G0300 HHS/HOSPICE OF LPN EA 15 MIN: HCPCS

## 2020-01-13 ENCOUNTER — HOME CARE VISIT (OUTPATIENT)
Dept: SCHEDULING | Facility: HOME HEALTH | Age: 65
End: 2020-01-13
Payer: MEDICARE

## 2020-01-13 ENCOUNTER — HOME CARE VISIT (OUTPATIENT)
Dept: HOME HEALTH SERVICES | Facility: HOME HEALTH | Age: 65
End: 2020-01-13
Payer: MEDICARE

## 2020-01-13 PROCEDURE — G0299 HHS/HOSPICE OF RN EA 15 MIN: HCPCS

## 2020-01-14 ENCOUNTER — OFFICE VISIT (OUTPATIENT)
Dept: FAMILY MEDICINE CLINIC | Age: 65
End: 2020-01-14

## 2020-01-14 VITALS
DIASTOLIC BLOOD PRESSURE: 74 MMHG | RESPIRATION RATE: 16 BRPM | OXYGEN SATURATION: 95 % | SYSTOLIC BLOOD PRESSURE: 108 MMHG | TEMPERATURE: 98 F | HEIGHT: 69 IN | BODY MASS INDEX: 32.73 KG/M2 | WEIGHT: 221 LBS | HEART RATE: 88 BPM

## 2020-01-14 DIAGNOSIS — I25.83 CORONARY ARTERY DISEASE DUE TO LIPID RICH PLAQUE: Primary | ICD-10-CM

## 2020-01-14 DIAGNOSIS — I25.10 CORONARY ARTERY DISEASE DUE TO LIPID RICH PLAQUE: Primary | ICD-10-CM

## 2020-01-14 DIAGNOSIS — I10 ESSENTIAL HYPERTENSION: ICD-10-CM

## 2020-01-14 DIAGNOSIS — E78.5 DYSLIPIDEMIA: ICD-10-CM

## 2020-01-14 DIAGNOSIS — K21.9 GASTROESOPHAGEAL REFLUX DISEASE, ESOPHAGITIS PRESENCE NOT SPECIFIED: ICD-10-CM

## 2020-01-14 DIAGNOSIS — E11.65 TYPE 2 DIABETES MELLITUS WITH HYPERGLYCEMIA, WITHOUT LONG-TERM CURRENT USE OF INSULIN (HCC): ICD-10-CM

## 2020-01-14 DIAGNOSIS — G62.9 NEUROPATHY: ICD-10-CM

## 2020-01-14 RX ORDER — ASPIRIN 81 MG/1
81 TABLET ORAL DAILY
COMMUNITY
End: 2020-01-19 | Stop reason: SDUPTHER

## 2020-01-14 RX ORDER — POLYETHYLENE GLYCOL 3350 17 G/17G
17 POWDER, FOR SOLUTION ORAL AS NEEDED
COMMUNITY
End: 2021-02-12 | Stop reason: ALTCHOICE

## 2020-01-14 RX ORDER — LANOLIN ALCOHOL/MO/W.PET/CERES
3 CREAM (GRAM) TOPICAL AS NEEDED
COMMUNITY
End: 2021-02-12 | Stop reason: ALTCHOICE

## 2020-01-14 NOTE — PATIENT INSTRUCTIONS
Body Mass Index: Care Instructions Your Care Instructions Body mass index (BMI) can help you see if your weight is raising your risk for health problems. It uses a formula to compare how much you weigh with how tall you are. · A BMI lower than 18.5 is considered underweight. · A BMI between 18.5 and 24.9 is considered healthy. · A BMI between 25 and 29.9 is considered overweight. A BMI of 30 or higher is considered obese. If your BMI is in the normal range, it means that you have a lower risk for weight-related health problems. If your BMI is in the overweight or obese range, you may be at increased risk for weight-related health problems, such as high blood pressure, heart disease, stroke, arthritis or joint pain, and diabetes. If your BMI is in the underweight range, you may be at increased risk for health problems such as fatigue, lower protection (immunity) against illness, muscle loss, bone loss, hair loss, and hormone problems. BMI is just one measure of your risk for weight-related health problems. You may be at higher risk for health problems if you are not active, you eat an unhealthy diet, or you drink too much alcohol or use tobacco products. Follow-up care is a key part of your treatment and safety. Be sure to make and go to all appointments, and call your doctor if you are having problems. It's also a good idea to know your test results and keep a list of the medicines you take. How can you care for yourself at home? · Practice healthy eating habits. This includes eating plenty of fruits, vegetables, whole grains, lean protein, and low-fat dairy. · If your doctor recommends it, get more exercise. Walking is a good choice. Bit by bit, increase the amount you walk every day. Try for at least 30 minutes on most days of the week. · Do not smoke. Smoking can increase your risk for health problems.  If you need help quitting, talk to your doctor about stop-smoking programs and medicines. These can increase your chances of quitting for good. · Limit alcohol to 2 drinks a day for men and 1 drink a day for women. Too much alcohol can cause health problems. If you have a BMI higher than 25 · Your doctor may do other tests to check your risk for weight-related health problems. This may include measuring the distance around your waist. A waist measurement of more than 40 inches in men or 35 inches in women can increase the risk of weight-related health problems. · Talk with your doctor about steps you can take to stay healthy or improve your health. You may need to make lifestyle changes to lose weight and stay healthy, such as changing your diet and getting regular exercise. If you have a BMI lower than 18.5 · Your doctor may do other tests to check your risk for health problems. · Talk with your doctor about steps you can take to stay healthy or improve your health. You may need to make lifestyle changes to gain or maintain weight and stay healthy, such as getting more healthy foods in your diet and doing exercises to build muscle. Where can you learn more? Go to http://renetta-zachary.info/. Enter S176 in the search box to learn more about \"Body Mass Index: Care Instructions. \" Current as of: October 13, 2016 Content Version: 11.4 © 9645-7020 [employer]. Care instructions adapted under license by [employer] (which disclaims liability or warranty for this information). If you have questions about a medical condition or this instruction, always ask your healthcare professional. [address] any warranty or liability for your use of this information.

## 2020-01-14 NOTE — PROGRESS NOTES
Chief Complaint   Patient presents with    Follow-up     recent surgery      1. Have you been to the ER, urgent care clinic since your last visit? Hospitalized since your last visit? Yes When: 12/06/2019 [facility]: [facility]  Reason for visit: CAD    2. Have you seen or consulted any other health care providers outside of the [address] [place] [address] since your last visit? Include any pap smears or colon screening. No     HPI  Rito Chand comes in for f/u care. Cardiac: Patient recently underwent quadruple cardiac bypass surgery due to CAD. He was admitted at [facility] from 12/06/2019 to 12/31/2019. He has felt some improvement since he went home. He denies chest pain, shortness of breath or diaphoresis. He is due to start cardiac rehab. Strength has been improving. He is on Toprol-XL, aspirin, rosuvastatin, furosemide, spironolactone. He is also on Coumadin daily. He is followed up with Coumadin clinic. INR is stable. DM2: Patient has diabetes mellitus type 2. He is on metformin and Januvia. He is doing lifestyle and dietary modification. His last HbA1c was 6.8. Blood glucose numbers have been stable. He should resume his diabetes medications. HTN: Patient has hypertension. Blood pressure is stable. He is on metoprolol. He denies headache, changes in vision or focal weakness. Neuropathy: Patient is on gabapentin. Stable on this medication. Dyslipidemia: Patient has dyslipidemia. Currently he is on rosuvastatin. We will continue with the current treatment plan. GERD: Patient is on omeprazole. He denies plan or dark stools.     Past Medical History  Past Medical History:   Diagnosis Date    Diabetes ([facility] [address] [facility]     Glaucoma     HTN (hypertension)     Hyperlipemia        Surgical History  Past Surgical History:   Procedure Laterality Date    HX COLONOSCOPY  7 years ago    HX KNEE REPLACEMENT  2017    rigjt and left replacement         Medications  Current Outpatient Medications Medication Sig Dispense Refill    melatonin 3 mg tablet Take 3 mg by mouth as needed.  polyethylene glycol (MIRALAX) 17 gram/dose powder Take 17 g by mouth as needed.  ferrous sulfate 325 mg (65 mg iron) tablet Take 325 mg by mouth every other day. Take 1 tab every other day      furosemide (LASIX) 20 mg tablet Take 20 mg by mouth daily. Take 20mg am       gabapentin (NEURONTIN) 100 mg capsule Take 100 mg by mouth three (3) times daily.  metoprolol succinate (TOPROL-XL) 100 mg tablet Take 100 mg by mouth nightly.  omeprazole (PRILOSEC OTC) 20 mg tablet Take 20 mg by mouth daily.  rosuvastatin (CRESTOR) 10 mg tablet Take 10 mg by mouth every Monday, Wednesday, Friday.  SITagliptin (JANUVIA) 100 mg tablet Take 100 mg by mouth daily.  spironolactone (ALDACTONE) 25 mg tablet Take 25 mg by mouth daily.  omega-3 fatty acids/fish oil (THERAGRAN-M PO) Take 1 Tab by mouth daily.  warfarin (COUMADIN) 6 mg tablet Take 6 mg by mouth daily.  colesevelam (WELCHOL) 625 mg tablet Take 1,250 mg by mouth two (2) times daily (with meals). Take 2 tabs by mouth daily      co-enzyme Q-10 (CO Q-10) 100 mg capsule Take 100 mg by mouth daily.  brimonidine (ALPHAGAN P) 0.1 % ophthalmic solution Administer 1 Drop to both eyes every eight (8) hours.  metFORMIN ER (GLUCOPHAGE XR) 500 mg tablet Take 1,000 mg by mouth two (2) times daily (after meals). 30 Tab 0    aspirin delayed-release 81 mg tablet Take 81 mg by mouth daily.  magnesium oxide (MAG-OX) 400 mg tablet Take 400 mg by mouth daily.  senna-docusate (SENNA WITH DOCUSATE SODIUM) 8.6-50 mg per tablet Take 1 Tab by mouth daily.          Allergies  Allergies   Allergen Reactions    Statins-Hmg-Coa Reductase Inhibitors Other (comments)       Family History  Family History   Problem Relation Age of Onset    Heart Disease Sister     Heart Attack Brother     Cancer Brother     Anemia Brother        Social History  Social History     Socioeconomic History    Marital status:      Spouse name: Not on file    Number of children: Not on file    Years of education: Not on file    Highest education level: Not on file   Occupational History    Not on file   Social Needs    Financial resource strain: Not on file    Food insecurity:     Worry: Not on file     Inability: Not on file    Transportation needs:     Medical: Not on file     Non-medical: Not on file   Tobacco Use    Smoking status: Former Smoker    Smokeless tobacco: Never Used   Substance and Sexual Activity    Alcohol use: Yes     Alcohol/week: 1.0 standard drinks     Types: 1 Shots of liquor per week    Drug use: Never    Sexual activity: Yes     Partners: Female   Lifestyle    Physical activity:     Days per week: Not on file     Minutes per session: Not on file    Stress: Not on file   Relationships    Social connections:     Talks on phone: Not on file     Gets together: Not on file     Attends Yazdanism service: Not on file     Active member of club or organization: Not on file     Attends meetings of clubs or organizations: Not on file     Relationship status: Not on file    Intimate partner violence:     Fear of current or ex partner: Not on file     Emotionally abused: Not on file     Physically abused: Not on file     Forced sexual activity: Not on file   Other Topics Concern    Not on file   Social History Narrative    Not on file       Review of Systems  Review of Systems - Review of all systems is negative except as noted above in the HPI.     Vital Signs  Visit Vitals  /74 (BP 1 Location: Left arm, BP Patient Position: Sitting)   Pulse 88   Temp 98 °F (36.7 °C) (Oral)   Resp 16   Ht 5' 9\" (1.753 m)   Wt 221 lb (100.2 kg)   SpO2 95%   BMI 32.64 kg/m²         Physical Exam  Physical Examination: General appearance - oriented to person, place, and time, overweight, acyanotic, in no respiratory distress and well hydrated  Mental status - alert, oriented to person, place, and time, affect appropriate to mood  Mouth - mucous membranes moist, pharynx normal without lesions  Neck - supple, no significant adenopathy  Lymphatics - no palpable lymphadenopathy, no hepatosplenomegaly  Chest - clear to auscultation, no wheezes, rales or rhonchi, symmetric air entry, chest wall tenderness noted midline chest wall at incision/thoracotomy site area  Heart - S1 and S2 normal, systolic murmur 2/6 at lower left sternal border  Abdomen - no rebound tenderness noted  Back exam - limited range of motion  Neurological - normal muscle tone, no tremors, strength 5/5  Musculoskeletal - osteoarthritic changes noted in both hands  Extremities - intact peripheral pulses    Results  Results for orders placed or performed in visit on 01/02/20   POC PT/INR   Result Value Ref Range    Prothrombin time (POC) 33.5 (A) 11.8 - 14.9 seconds    INR POC 2.8 (A) 0.9 - 1.1       ASSESSMENT and PLAN    ICD-10-CM ICD-9-CM    1. Coronary artery disease due to lipid rich plaque I25.10 414.00     I25.83 414.3    2. Essential hypertension I10 401.9    3. Type 2 diabetes mellitus with hyperglycemia, without long-term current use of insulin (HCC) E11.65 250.00      790.29    4. Dyslipidemia E78.5 272.4    5. Neuropathy G62.9 355.9    6. Gastroesophageal reflux disease, esophagitis presence not specified K21.9 530.81    Discussed the patient's BMI with him.   The BMI follow up plan is as follows:     dietary management education, guidance, and counseling  encourage exercise  monitor weight  lab results and schedule of future lab studies reviewed with patient  reviewed diet, exercise and weight control  cardiovascular risk and specific lipid/LDL goals reviewed  reviewed medications and side effects in detail  specific diabetic recommendations: low cholesterol diet, weight control and daily exercise discussed, home glucose monitoring emphasized, all medications, side effects and compliance discussed carefully, glycohemoglobin and other lab monitoring discussed and long term diabetic complications discussed      I have discussed the diagnosis with the patient and the intended plan of care as seen in the above orders. The patient has received an after-visit summary and questions were answered concerning future plans. I have discussed medication, side effects, and warnings with the patient in detail. The patient verbalized understanding and is in agreement with the plan of care. The patient will contact the office with any additional concerns. Rica Bowser MD    PLEASE NOTE:   This document has been produced using voice recognition software.  Unrecognized errors in transcription may be present

## 2020-01-15 ENCOUNTER — HOME CARE VISIT (OUTPATIENT)
Dept: SCHEDULING | Facility: HOME HEALTH | Age: 65
End: 2020-01-15
Payer: MEDICARE

## 2020-01-15 VITALS
TEMPERATURE: 98.6 F | TEMPERATURE: 98.2 F | SYSTOLIC BLOOD PRESSURE: 126 MMHG | HEART RATE: 84 BPM | OXYGEN SATURATION: 98 % | DIASTOLIC BLOOD PRESSURE: 84 MMHG | SYSTOLIC BLOOD PRESSURE: 108 MMHG | HEART RATE: 87 BPM | OXYGEN SATURATION: 96 % | HEART RATE: 93 BPM | DIASTOLIC BLOOD PRESSURE: 78 MMHG | DIASTOLIC BLOOD PRESSURE: 80 MMHG | OXYGEN SATURATION: 95 % | SYSTOLIC BLOOD PRESSURE: 128 MMHG | TEMPERATURE: 98.1 F

## 2020-01-15 VITALS
RESPIRATION RATE: 20 BRPM | TEMPERATURE: 98.5 F | DIASTOLIC BLOOD PRESSURE: 92 MMHG | HEART RATE: 90 BPM | SYSTOLIC BLOOD PRESSURE: 148 MMHG

## 2020-01-15 PROCEDURE — G0299 HHS/HOSPICE OF RN EA 15 MIN: HCPCS

## 2020-01-17 ENCOUNTER — HOME CARE VISIT (OUTPATIENT)
Dept: SCHEDULING | Facility: HOME HEALTH | Age: 65
End: 2020-01-17
Payer: MEDICARE

## 2020-01-17 PROCEDURE — G0299 HHS/HOSPICE OF RN EA 15 MIN: HCPCS

## 2020-01-19 VITALS
SYSTOLIC BLOOD PRESSURE: 128 MMHG | OXYGEN SATURATION: 97 % | TEMPERATURE: 96.6 F | HEART RATE: 88 BPM | SYSTOLIC BLOOD PRESSURE: 120 MMHG | RESPIRATION RATE: 20 BRPM | DIASTOLIC BLOOD PRESSURE: 80 MMHG | RESPIRATION RATE: 20 BRPM | OXYGEN SATURATION: 99 % | TEMPERATURE: 98.3 F | DIASTOLIC BLOOD PRESSURE: 78 MMHG | HEART RATE: 80 BPM

## 2020-01-20 ENCOUNTER — HOME CARE VISIT (OUTPATIENT)
Dept: SCHEDULING | Facility: HOME HEALTH | Age: 65
End: 2020-01-20
Payer: MEDICARE

## 2020-01-20 VITALS
RESPIRATION RATE: 16 BRPM | HEART RATE: 68 BPM | OXYGEN SATURATION: 98 % | SYSTOLIC BLOOD PRESSURE: 114 MMHG | TEMPERATURE: 97.4 F | DIASTOLIC BLOOD PRESSURE: 72 MMHG

## 2020-01-20 PROBLEM — K21.9 GASTROESOPHAGEAL REFLUX DISEASE: Status: ACTIVE | Noted: 2020-01-20

## 2020-01-20 LAB
INR BLD: 2.4 (ref 0.9–1.1)
PT POC: 28.3 SECONDS (ref 11.8–14.9)

## 2020-01-20 PROCEDURE — G0300 HHS/HOSPICE OF LPN EA 15 MIN: HCPCS

## 2020-01-22 ENCOUNTER — HOME CARE VISIT (OUTPATIENT)
Dept: SCHEDULING | Facility: HOME HEALTH | Age: 65
End: 2020-01-22
Payer: MEDICARE

## 2020-01-22 PROCEDURE — G0300 HHS/HOSPICE OF LPN EA 15 MIN: HCPCS

## 2020-01-24 ENCOUNTER — HOME CARE VISIT (OUTPATIENT)
Dept: SCHEDULING | Facility: HOME HEALTH | Age: 65
End: 2020-01-24
Payer: MEDICARE

## 2020-01-24 PROCEDURE — G0299 HHS/HOSPICE OF RN EA 15 MIN: HCPCS

## 2020-01-25 VITALS
TEMPERATURE: 97.8 F | HEART RATE: 82 BPM | DIASTOLIC BLOOD PRESSURE: 82 MMHG | OXYGEN SATURATION: 98 % | RESPIRATION RATE: 20 BRPM | SYSTOLIC BLOOD PRESSURE: 126 MMHG

## 2020-01-27 ENCOUNTER — TELEPHONE (OUTPATIENT)
Dept: FAMILY MEDICINE CLINIC | Age: 65
End: 2020-01-27

## 2020-01-27 VITALS
TEMPERATURE: 98.4 F | OXYGEN SATURATION: 95 % | DIASTOLIC BLOOD PRESSURE: 86 MMHG | SYSTOLIC BLOOD PRESSURE: 118 MMHG | HEART RATE: 86 BPM

## 2020-01-27 NOTE — TELEPHONE ENCOUNTER
Patient called requesting Rx for:    One Touch Yashira test strips.    One Touch Delica Plus Lancets

## 2020-01-28 NOTE — TELEPHONE ENCOUNTER
Patient came into the office asking about his diabetic supplies. He is out of test strips and lancets.

## 2020-01-29 RX ORDER — LANCETS
EACH MISCELLANEOUS
Qty: 200 EACH | Refills: 3 | Status: SHIPPED | OUTPATIENT
Start: 2020-01-29 | End: 2020-04-14 | Stop reason: SDUPTHER

## 2020-02-10 ENCOUNTER — TELEPHONE (OUTPATIENT)
Dept: FAMILY MEDICINE CLINIC | Age: 65
End: 2020-02-10

## 2020-02-10 NOTE — TELEPHONE ENCOUNTER
Patient and wife came into office asking for letter to say he's unable to return to work, due his health conditions. Wife is requesting any new conditions be listed (neuropathy) on the letter.     [phone] (wife's #)

## 2020-02-18 NOTE — TELEPHONE ENCOUNTER
Patient called to check the status of his request about a letter he needs for Dr Crow Foley to write.  Advised pt the nurse will contact when available to discuss

## 2020-02-20 ENCOUNTER — DOCUMENTATION ONLY (OUTPATIENT)
Dept: FAMILY MEDICINE CLINIC | Age: 65
End: 2020-02-20

## 2020-02-24 ENCOUNTER — TELEPHONE (OUTPATIENT)
Dept: FAMILY MEDICINE CLINIC | Age: 65
End: 2020-02-24

## 2020-02-24 NOTE — TELEPHONE ENCOUNTER
Patient came into the office today with question regarding medication. When he came out of the hospital he was prescribed Januvia and Metformin. He wants to know if he needs to continue this. He states his blood sugar levels have been good.        [phone]

## 2020-02-26 DIAGNOSIS — G62.9 NEUROPATHY: Primary | ICD-10-CM

## 2020-02-26 NOTE — TELEPHONE ENCOUNTER
Requested Prescriptions     Pending Prescriptions Disp Refills    gabapentin (NEURONTIN) 100 mg capsule       Sig: Take 1 Cap by mouth three (3) times daily. Max Daily Amount: 300 mg. Patient wants to know if he still needs to be on Januvia. This was prescribed at the hospital. He doesn't want to request a refill on this if he doesn't need it because it's very expensive.

## 2020-02-28 RX ORDER — GABAPENTIN 100 MG/1
100 CAPSULE ORAL 3 TIMES DAILY
Qty: 90 CAP | Refills: 0 | Status: SHIPPED | OUTPATIENT
Start: 2020-02-28 | End: 2020-03-18 | Stop reason: SDUPTHER

## 2020-02-28 NOTE — TELEPHONE ENCOUNTER
Spoke with patient at this time. Patient states he need refill for the medications that was prescribe to him in the hospital at this time. Patient is requesting Gabapentin and Januvia. Informed patient gabapentin is a controlled substance he he would have to sign a contract. Patient states his next appointment is not until April. Patient is asking for refill today until his next appointment. Please advise

## 2020-02-28 NOTE — TELEPHONE ENCOUNTER
Patient should continue with medication prescribed in the hospital until we see him in the clinic.   Akosua Morales MD

## 2020-03-02 ENCOUNTER — TELEPHONE (OUTPATIENT)
Dept: FAMILY MEDICINE CLINIC | Age: 65
End: 2020-03-02

## 2020-03-02 NOTE — TELEPHONE ENCOUNTER
Patient came into the office stating his BP was high. He takes his Metoprolol 100mg at night. He is asking if he split it up 50mg morning/night or take it in the morning.

## 2020-03-03 NOTE — TELEPHONE ENCOUNTER
Return patient phone call at this time. No answer noted. LVM informing patient should be ok. Also informed patient on voicemail any other questions or changes  regarding medications will have to be discussed at next appointment

## 2020-03-18 ENCOUNTER — OFFICE VISIT (OUTPATIENT)
Dept: FAMILY MEDICINE CLINIC | Age: 65
End: 2020-03-18

## 2020-03-18 VITALS
WEIGHT: 226 LBS | RESPIRATION RATE: 20 BRPM | HEIGHT: 69 IN | DIASTOLIC BLOOD PRESSURE: 67 MMHG | OXYGEN SATURATION: 97 % | TEMPERATURE: 96.1 F | HEART RATE: 82 BPM | BODY MASS INDEX: 33.47 KG/M2 | SYSTOLIC BLOOD PRESSURE: 111 MMHG

## 2020-03-18 DIAGNOSIS — Z11.59 NEED FOR HEPATITIS C SCREENING TEST: ICD-10-CM

## 2020-03-18 DIAGNOSIS — I25.83 CORONARY ARTERY DISEASE DUE TO LIPID RICH PLAQUE: Primary | ICD-10-CM

## 2020-03-18 DIAGNOSIS — G62.9 NEUROPATHY: ICD-10-CM

## 2020-03-18 DIAGNOSIS — E11.65 TYPE 2 DIABETES MELLITUS WITH HYPERGLYCEMIA, WITHOUT LONG-TERM CURRENT USE OF INSULIN (HCC): ICD-10-CM

## 2020-03-18 DIAGNOSIS — I25.10 CORONARY ARTERY DISEASE DUE TO LIPID RICH PLAQUE: Primary | ICD-10-CM

## 2020-03-18 DIAGNOSIS — I10 ESSENTIAL HYPERTENSION: ICD-10-CM

## 2020-03-18 LAB
MICROALBUMIN UR TEST STR-MCNC: 10 MG/L
MICROALBUMIN/CREAT RATIO POC: NORMAL MG/G

## 2020-03-18 RX ORDER — METFORMIN HYDROCHLORIDE 500 MG/1
500 TABLET, EXTENDED RELEASE ORAL
Qty: 180 TAB | Refills: 1 | Status: SHIPPED | OUTPATIENT
Start: 2020-03-18 | End: 2020-09-21

## 2020-03-18 RX ORDER — METOPROLOL SUCCINATE 100 MG/1
100 TABLET, EXTENDED RELEASE ORAL
COMMUNITY
Start: 2019-12-31 | End: 2022-10-18 | Stop reason: SDUPTHER

## 2020-03-18 RX ORDER — METFORMIN HYDROCHLORIDE 500 MG/1
500 TABLET, EXTENDED RELEASE ORAL
Qty: 180 TAB | Refills: 0 | COMMUNITY
Start: 2020-03-18 | End: 2020-03-18 | Stop reason: SDUPTHER

## 2020-03-18 RX ORDER — COLESEVELAM 180 1/1
1250 TABLET ORAL DAILY
COMMUNITY
Start: 2019-12-31 | End: 2020-08-03 | Stop reason: SDUPTHER

## 2020-03-18 RX ORDER — ISOSORBIDE MONONITRATE 30 MG/1
30 TABLET, EXTENDED RELEASE ORAL DAILY
COMMUNITY
Start: 2020-03-13 | End: 2021-03-09 | Stop reason: SDUPTHER

## 2020-03-18 RX ORDER — METOPROLOL SUCCINATE 100 MG/1
100 TABLET, EXTENDED RELEASE ORAL
Qty: 30 TAB | Refills: 2 | Status: SHIPPED | OUTPATIENT
Start: 2020-03-18 | End: 2020-10-15 | Stop reason: SDUPTHER

## 2020-03-18 RX ORDER — GABAPENTIN 100 MG/1
100 CAPSULE ORAL 3 TIMES DAILY
Qty: 90 CAP | Refills: 3 | Status: SHIPPED | OUTPATIENT
Start: 2020-03-18 | End: 2020-08-03

## 2020-03-18 NOTE — PROGRESS NOTES
Chief Complaint   Patient presents with   [facility]     chest pain      1. Have you been to the ER, urgent care clinic since your last visit? Hospitalized since your last visit? No    2. Have you seen or consulted any other health care providers outside of the [address] [place] [address] since your last visit? Include any pap smears or colon screening. No     HPI  Josie Kee comes in for post hospital stay. Seen on 03/13/2020 at hospital, admitted and had tests done and he was discharged on the same day. He was evaluated for possible acute coronary syndrome. States that the tests were negative. I will request the records. Currently denies chest pain, shortness of breath or diaphoresis. Patient had medications adjusted. He is currently on Toprol-XL and Imdur. Patient is also on spironolactone and Lasix. I did reassure him that he should continue with his medications. Patient has diabetes mellitus type 2. He is on metformin and Januvia. We will continue with his medications. His last HbA1c was 6.3. We will recheck labs today. He has dyslipidemia and takes WelChol and Crestor. Discussed lifestyle and dietary modification. He will continue to intensify this. His BMI is 33.37. This is obesity. He will try to modify his lifestyle in an effort to bring this down. Patient has hypertension. He is on Toprol-XL. Blood pressure is stable. He denies headache, changes in vision or focal weakness. He also takes spironolactone 25 mg daily. We will continue with the current treatment plan. Patient has neuropathy with numbness and tingling of the upper and lower extremities. He takes gabapentin. Will send in a prescription for this.         Past Medical History  Past Medical History:   Diagnosis Date    Diabetes ([address]     Glaucoma     HTN (hypertension)     Hyperlipemia        Surgical History  Past Surgical History:   Procedure Laterality Date    HX COLONOSCOPY  7 years ago    HX KNEE REPLACEMENT  2017    rigjt and left replacement         Medications  Current Outpatient Medications   Medication Sig Dispense Refill    isosorbide mononitrate ER (IMDUR) 30 mg tablet Take 30 mg by mouth daily.  colesevelam (WELCHOL) 625 mg tablet Take 1,250 mg by mouth daily.  metoprolol succinate (TOPROL-XL) 100 mg tablet Take 100 mg by mouth nightly.  metFORMIN ER (GLUCOPHAGE XR) 500 mg tablet Take 1 Tab by mouth two (2) times daily (after meals). 180 Tab 1    SITagliptin (Januvia) 100 mg tablet Take 1 Tab by mouth daily. 90 Tab 1    gabapentin (NEURONTIN) 100 mg capsule Take 1 Cap by mouth three (3) times daily. Max Daily Amount: 300 mg. 90 Cap 3    metoprolol succinate (TOPROL-XL) 100 mg tablet Take 1 Tab by mouth nightly. 30 Tab 2    glucose blood VI test strips (BLOOD GLUCOSE TEST) strip Check blood glucose 2 times daily. Patient would like One Touch Yasihra test strips 200 Strip 3    lancets misc Check blood glucose 2 times daily. Patient would like One Touch Delica Plus Lancets 101 Each 3    melatonin 3 mg tablet Take 3 mg by mouth as needed.  polyethylene glycol (MIRALAX) 17 gram/dose powder Take 17 g by mouth as needed.  aspirin delayed-release 81 mg tablet Take 81 mg by mouth daily.  ferrous sulfate 325 mg (65 mg iron) tablet Take 325 mg by mouth every other day. Take 1 tab every other day      furosemide (LASIX) 20 mg tablet Take 20 mg by mouth daily. Take 20mg am       magnesium oxide (MAG-OX) 400 mg tablet Take 400 mg by mouth daily.  omeprazole (PRILOSEC OTC) 20 mg tablet Take 20 mg by mouth daily.  rosuvastatin (CRESTOR) 10 mg tablet Take 10 mg by mouth every Monday, Wednesday, Friday.  senna-docusate (SENNA WITH DOCUSATE SODIUM) 8.6-50 mg per tablet Take 1 Tab by mouth daily.  spironolactone (ALDACTONE) 25 mg tablet Take 25 mg by mouth daily.  omega-3 fatty acids/fish oil (THERAGRAN-M PO) Take 1 Tab by mouth daily.       warfarin (COUMADIN) 6 mg tablet Take 6 mg by mouth daily.  co-enzyme Q-10 (CO Q-10) 100 mg capsule Take 100 mg by mouth daily.  brimonidine (ALPHAGAN P) 0.1 % ophthalmic solution Administer 1 Drop to both eyes every eight (8) hours.  colesevelam (WELCHOL) 625 mg tablet Take 1,250 mg by mouth two (2) times daily (with meals). Take 2 tabs by mouth daily         Allergies  Allergies   Allergen Reactions    Statins-Hmg-Coa Reductase Inhibitors Other (comments)       Family History  Family History   Problem Relation Age of Onset    Heart Disease Sister     Heart Attack Brother     Cancer Brother     Anemia Brother        Social History  Social History     Socioeconomic History    Marital status:      Spouse name: Not on file    Number of children: Not on file    Years of education: Not on file    Highest education level: Not on file   Occupational History    Not on file   Social Needs    Financial resource strain: Not on file    Food insecurity     Worry: Not on file     Inability: Not on file    Transportation needs     Medical: Not on file     Non-medical: Not on file   Tobacco Use    Smoking status: Former Smoker    Smokeless tobacco: Never Used   Substance and Sexual Activity    Alcohol use:  Yes     Alcohol/week: 1.0 standard drinks     Types: 1 Shots of liquor per week    Drug use: Never    Sexual activity: Yes     Partners: Female   Lifestyle    Physical activity     Days per week: Not on file     Minutes per session: Not on file    Stress: Not on file   Relationships    Social connections     Talks on phone: Not on file     Gets together: Not on file     Attends [employer]ism service: Not on file     Active member of club or organization: Not on file     Attends meetings of clubs or organizations: Not on file     Relationship status: Not on file    Intimate partner violence     Fear of current or ex partner: Not on file     Emotionally abused: Not on file     Physically abused: Not on file     Forced sexual activity: Not on file   Other Topics Concern    Not on file   Social History Narrative    Not on file       Review of Systems  Review of Systems - Review of all systems is negative except as noted above in the HPI. Vital Signs  Visit Vitals  /81 (BP 1 Location: Left arm, BP Patient Position: Sitting)   Pulse 82   Temp 96.1 °F (35.6 °C) (Oral)   Resp 20   Ht 5' 9\" (1.753 m)   Wt 226 lb (102.5 kg)   SpO2 97%   BMI 33.37 kg/m²         Physical Exam  Physical Examination: General appearance - oriented to person, place, and time, overweight and acyanotic, in no respiratory distress  Mental status - alert, oriented to person, place, and time, affect appropriate to mood  Neck - supple, no significant adenopathy  Lymphatics - no palpable lymphadenopathy  Chest - decreased air entry noted bilateral lung bases  Heart - systolic murmur 3/6 at lower left sternal border  Abdomen - no rebound tenderness noted  Back exam - limited range of motion  Neurological - abnormal neurological exam unchanged from prior examinations  Musculoskeletal - osteoarthritic changes noted in both hands  Extremities - intact peripheral pulses    Results  Results for orders placed or performed in visit on 01/20/20   POC PT/INR   Result Value Ref Range    Prothrombin time (POC) 28.3 (A) 11.8 - 14.9 seconds    INR POC 2.4 (A) 0.9 - 1.1       ASSESSMENT and PLAN    ICD-10-CM ICD-9-CM    1. Coronary artery disease due to lipid rich plaque I25.10 414.00     I25.83 414.3    2. Type 2 diabetes mellitus with hyperglycemia, without long-term current use of insulin ([facility]) E11.65 250.00 metFORMIN ER (GLUCOPHAGE XR) 500 mg tablet     790.29 SITagliptin (Januvia) 100 mg tablet      CBC WITH AUTOMATED DIFF      METABOLIC PANEL, COMPREHENSIVE      LIPID PANEL      HEMOGLOBIN A1C WITH EAG      AMB POC URINE, MICROALBUMIN, SEMIQUANTITATIVE      DISCONTINUED: metFORMIN ER (GLUCOPHAGE XR) 500 mg tablet   3.  Neuropathy G62.9 355.9 gabapentin (NEURONTIN) 100 mg capsule   4. Essential hypertension I10 401.9    5. Need for hepatitis C screening test Z11.59 V73.89 HEPATITIS C AB     lab results and schedule of future lab studies reviewed with patient  reviewed diet, exercise and weight control  cardiovascular risk and specific lipid/LDL goals reviewed  reviewed medications and side effects in detail  specific diabetic recommendations: low cholesterol diet, weight control and daily exercise discussed, home glucose monitoring emphasized, all medications, side effects and compliance discussed carefully, glycohemoglobin and other lab monitoring discussed and long term diabetic complications discussed      I have discussed the diagnosis with the patient and the intended plan of care as seen in the above orders. The patient has received an after-visit summary and questions were answered concerning future plans. I have discussed medication, side effects, and warnings with the patient in detail. The patient verbalized understanding and is in agreement with the plan of care. The patient will contact the office with any additional concerns. Flakito Barron MD    PLEASE NOTE:   This document has been produced using voice recognition software.  Unrecognized errors in transcription may be present

## 2020-03-19 ENCOUNTER — HOSPITAL ENCOUNTER (OUTPATIENT)
Dept: LAB | Age: 65
Discharge: HOME | End: 2020-03-19
Payer: MEDICARE

## 2020-03-19 ENCOUNTER — LAB ONLY (OUTPATIENT)
Dept: FAMILY MEDICINE CLINIC | Age: 65
End: 2020-03-19

## 2020-03-19 DIAGNOSIS — Z01.89 ENCOUNTER FOR LABORATORY TEST: Primary | ICD-10-CM

## 2020-03-19 DIAGNOSIS — Z11.59 NEED FOR HEPATITIS C SCREENING TEST: ICD-10-CM

## 2020-03-19 DIAGNOSIS — E11.65 TYPE 2 DIABETES MELLITUS WITH HYPERGLYCEMIA, WITHOUT LONG-TERM CURRENT USE OF INSULIN (HCC): ICD-10-CM

## 2020-03-19 LAB
ALBUMIN SERPL-MCNC: 3.9 G/DL (ref 3.4–5)
ALBUMIN/GLOB SERPL: 1.1 {RATIO} (ref 0.8–1.7)
ALP SERPL-CCNC: 72 U/L (ref 45–117)
ALT SERPL-CCNC: 30 U/L (ref 16–61)
ANION GAP SERPL CALC-SCNC: 7 MMOL/L (ref 3–18)
AST SERPL-CCNC: 23 U/L (ref 10–38)
BASOPHILS # BLD: 0 K/UL (ref 0–0.1)
BASOPHILS NFR BLD: 0 % (ref 0–2)
BILIRUB SERPL-MCNC: 0.5 MG/DL (ref 0.2–1)
BUN SERPL-MCNC: 16 MG/DL (ref 7–18)
BUN/CREAT SERPL: 16 (ref 12–20)
CALCIUM SERPL-MCNC: 9.2 MG/DL (ref 8.5–10.1)
CHLORIDE SERPL-SCNC: 109 MMOL/L (ref 100–111)
CHOLEST SERPL-MCNC: 177 MG/DL
CO2 SERPL-SCNC: 24 MMOL/L (ref 21–32)
CREAT SERPL-MCNC: 0.99 MG/DL (ref 0.6–1.3)
DIFFERENTIAL METHOD BLD: ABNORMAL
EOSINOPHIL # BLD: 0.1 K/UL (ref 0–0.4)
EOSINOPHIL NFR BLD: 2 % (ref 0–5)
ERYTHROCYTE [DISTWIDTH] IN BLOOD BY AUTOMATED COUNT: 14.5 % (ref 11.6–14.5)
EST. AVERAGE GLUCOSE BLD GHB EST-MCNC: 134 MG/DL
GLOBULIN SER CALC-MCNC: 3.4 G/DL (ref 2–4)
GLUCOSE SERPL-MCNC: 119 MG/DL (ref 74–99)
HBA1C MFR BLD: 6.3 % (ref 4.2–5.6)
HCT VFR BLD AUTO: 44.8 % (ref 36–48)
HDLC SERPL-MCNC: 40 MG/DL (ref 40–60)
HDLC SERPL: 4.4 {RATIO} (ref 0–5)
HGB BLD-MCNC: 14.5 G/DL (ref 13–16)
LDLC SERPL CALC-MCNC: 102.8 MG/DL (ref 0–100)
LIPID PROFILE,FLP: ABNORMAL
LYMPHOCYTES # BLD: 1.2 K/UL (ref 0.9–3.6)
LYMPHOCYTES NFR BLD: 21 % (ref 21–52)
MCH RBC QN AUTO: 30.3 PG (ref 24–34)
MCHC RBC AUTO-ENTMCNC: 32.4 G/DL (ref 31–37)
MCV RBC AUTO: 93.5 FL (ref 74–97)
MONOCYTES # BLD: 0.9 K/UL (ref 0.05–1.2)
MONOCYTES NFR BLD: 15 % (ref 3–10)
NEUTS SEG # BLD: 3.6 K/UL (ref 1.8–8)
NEUTS SEG NFR BLD: 62 % (ref 40–73)
PLATELET # BLD AUTO: 242 K/UL (ref 135–420)
PMV BLD AUTO: 9.8 FL (ref 9.2–11.8)
POTASSIUM SERPL-SCNC: 4.7 MMOL/L (ref 3.5–5.5)
PROT SERPL-MCNC: 7.3 G/DL (ref 6.4–8.2)
RBC # BLD AUTO: 4.79 M/UL (ref 4.7–5.5)
SODIUM SERPL-SCNC: 140 MMOL/L (ref 136–145)
TRIGL SERPL-MCNC: 171 MG/DL (ref ?–150)
VLDLC SERPL CALC-MCNC: 34.2 MG/DL
WBC # BLD AUTO: 5.8 K/UL (ref 4.6–13.2)

## 2020-03-19 PROCEDURE — 80061 LIPID PANEL: CPT

## 2020-03-19 PROCEDURE — 83036 HEMOGLOBIN GLYCOSYLATED A1C: CPT

## 2020-03-19 PROCEDURE — 80053 COMPREHEN METABOLIC PANEL: CPT

## 2020-03-19 PROCEDURE — 85025 COMPLETE CBC W/AUTO DIFF WBC: CPT

## 2020-03-19 PROCEDURE — 36415 COLL VENOUS BLD VENIPUNCTURE: CPT

## 2020-03-19 PROCEDURE — 86803 HEPATITIS C AB TEST: CPT

## 2020-03-19 NOTE — PROGRESS NOTES
Venipuncture to patient right forearm at this time. Labs ordered by PCP. Patient tolerated well at this time.  No concerns noted

## 2020-03-20 LAB
HCV AB SER IA-ACNC: <0.02 INDEX
HCV AB SERPL QL IA: NEGATIVE
HCV COMMENT,HCGAC: NORMAL

## 2020-03-23 NOTE — PROGRESS NOTES
Please let patient know his HbA1c is 6.3. We will continue with the current management for diabetes. His LDL cholesterol is 102.8. He is on Crestor and WelChol. We should also take a diet low in polysaturated fats.   Evelin Kaur MD

## 2020-03-30 NOTE — PROGRESS NOTES
Called patient at this time. No answer noted. LVM informing patient to return phone call regarding results. Letter will be sent to advise patient to return phone call

## 2020-04-06 ENCOUNTER — DOCUMENTATION ONLY (OUTPATIENT)
Dept: FAMILY MEDICINE CLINIC | Age: 65
End: 2020-04-06

## 2020-04-06 NOTE — PROGRESS NOTES
Incoming call received from patient to go over lab results. Labs results discussed. Patient verbalized understanding at this time.

## 2020-04-14 ENCOUNTER — VIRTUAL VISIT (OUTPATIENT)
Dept: FAMILY MEDICINE CLINIC | Age: 65
End: 2020-04-14

## 2020-04-14 ENCOUNTER — TELEPHONE (OUTPATIENT)
Dept: FAMILY MEDICINE CLINIC | Age: 65
End: 2020-04-14

## 2020-04-14 DIAGNOSIS — M79.641 PAIN IN BOTH HANDS: ICD-10-CM

## 2020-04-14 DIAGNOSIS — E78.5 DYSLIPIDEMIA: ICD-10-CM

## 2020-04-14 DIAGNOSIS — G62.9 NEUROPATHY: ICD-10-CM

## 2020-04-14 DIAGNOSIS — I25.83 CORONARY ARTERY DISEASE DUE TO LIPID RICH PLAQUE: ICD-10-CM

## 2020-04-14 DIAGNOSIS — E11.65 TYPE 2 DIABETES MELLITUS WITH HYPERGLYCEMIA, WITHOUT LONG-TERM CURRENT USE OF INSULIN (HCC): Primary | ICD-10-CM

## 2020-04-14 DIAGNOSIS — I10 ESSENTIAL HYPERTENSION: ICD-10-CM

## 2020-04-14 DIAGNOSIS — G56.03 BILATERAL CARPAL TUNNEL SYNDROME: ICD-10-CM

## 2020-04-14 DIAGNOSIS — M79.642 PAIN IN BOTH HANDS: ICD-10-CM

## 2020-04-14 DIAGNOSIS — I25.10 CORONARY ARTERY DISEASE DUE TO LIPID RICH PLAQUE: ICD-10-CM

## 2020-04-14 RX ORDER — LANCETS
EACH MISCELLANEOUS
Qty: 100 EACH | Refills: 3 | Status: SHIPPED | OUTPATIENT
Start: 2020-04-14 | End: 2020-07-16 | Stop reason: SDUPTHER

## 2020-04-14 NOTE — PROGRESS NOTES
Consent: Onofre Ty, who was seen by synchronous (real-time) audio-video technology, and/or his healthcare decision maker, is aware that this patient-initiated, Telehealth encounter on 4/14/2020 is a billable service, with coverage as determined by his insurance carrier. He is aware that he may receive a bill and has provided verbal consent to proceed: Yes. Assessment & Plan:       ICD-10-CM ICD-9-CM    1. Type 2 diabetes mellitus with hyperglycemia, without long-term current use of insulin (HCC) E11.65 250.00      790.29    2. Neuropathy G62.9 355.9    3. Pain in both hands M79.641 729.5 REFERRAL TO ORTHOPEDICS    M79.642     4. Bilateral carpal tunnel syndrome G56.03 354.0 REFERRAL TO ORTHOPEDICS   5. Coronary artery disease due to lipid rich plaque I25.10 414.00     I25.83 414.3    6. Essential hypertension I10 401.9    7. Dyslipidemia E78.5 272.4        Subjective:   Onofre Ty is a 71 y.o. male who was seen for Hand Pain; Tingling; and Diabetes    Patient is seen for follow-up care. Patient has diabetes mellitus type 2. He is on metformin 500 mg twice a day. He is also on Januvia 100 mg daily. Blood glucose numbers have been stable. His last HbA1c was 6.3. He does need to have his foot exam done and needs to have a diabetes eye exam done. We will discuss this when he comes in at the next visit. Patient has numbness and tingling of his hands. Has been told that he has carpal tunnel syndrome in the past and was given steroid injections. Was told that he may need to have surgery. Now the numbness and tingling affects the middle 3 fingers of both hands.  strength is weak. Denies redness or swelling of the hands. He takes gabapentin for the numbness and the tingling. This helps somewhat. He has neuropathy due to diabetes which affects his lower extremities and the gabapentin helps with that also. For now mainly on his hands symptoms are noted at night.   He states that he feels that this time he is seen by the hand specialist to discuss other management options. Referral to orthopedist will be placed. Patient has a history of coronary artery disease and has been followed up by the cardiologist.  He was seen this morning for a virtual visit. Medications have been adjusted. He will get a diuretic that is Lasix. He did have some chest pain and was seen in the emergency room. Currently he is on Imdur and metoprolol succinate. He is also on Crestor and lisinopril. He will continue with the current treatment plan. He has a history of paroxysmal atrial fibrillation. He denies palpitations or syncope. He is on Coumadin. He will continue with this medication. I will follow-up with him in the clinic at next visit and we will do some lab work. He does need to have colon cancer screening done. Prior to Admission medications    Medication Sig Start Date End Date Taking? Authorizing Provider   isosorbide mononitrate ER (IMDUR) 30 mg tablet Take 30 mg by mouth daily. 3/13/20   Provider, Historical   colesevelam (WELCHOL) 625 mg tablet Take 1,250 mg by mouth daily. 12/31/19   Provider, Historical   metoprolol succinate (TOPROL-XL) 100 mg tablet Take 100 mg by mouth nightly. 12/31/19   Provider, Historical   metFORMIN ER (GLUCOPHAGE XR) 500 mg tablet Take 1 Tab by mouth two (2) times daily (after meals). 3/18/20   Malena Grider MD   SITagliptin (Januvia) 100 mg tablet Take 1 Tab by mouth daily. 3/18/20   Malena Grider MD   gabapentin (NEURONTIN) 100 mg capsule Take 1 Cap by mouth three (3) times daily. Max Daily Amount: 300 mg. 3/18/20   Malena Grider MD   metoprolol succinate (TOPROL-XL) 100 mg tablet Take 1 Tab by mouth nightly. 3/18/20   Malena Grider MD   glucose blood VI test strips (BLOOD GLUCOSE TEST) strip Check blood glucose 2 times daily. Patient would like One Touch Yashira test strips 1/29/20   Malena Grider MD   lancets misc Check blood glucose 2 times daily. Patient would like One Touch Delica Plus Lancets 1/62/42   Malena Grider MD   melatonin 3 mg tablet Take 3 mg by mouth as needed. Provider, Historical   polyethylene glycol (MIRALAX) 17 gram/dose powder Take 17 g by mouth as needed. Provider, Historical   aspirin delayed-release 81 mg tablet Take 81 mg by mouth daily. Provider, Historical   ferrous sulfate 325 mg (65 mg iron) tablet Take 325 mg by mouth every other day. Take 1 tab every other day    Provider, Historical   furosemide (LASIX) 20 mg tablet Take 20 mg by mouth daily. Take 20mg am     Provider, Historical   magnesium oxide (MAG-OX) 400 mg tablet Take 400 mg by mouth daily. Provider, Historical   omeprazole (PRILOSEC OTC) 20 mg tablet Take 20 mg by mouth daily. Provider, Historical   rosuvastatin (CRESTOR) 10 mg tablet Take 10 mg by mouth every Monday, Wednesday, Friday. Provider, Historical   senna-docusate (SENNA WITH DOCUSATE SODIUM) 8.6-50 mg per tablet Take 1 Tab by mouth daily. Provider, Historical   spironolactone (ALDACTONE) 25 mg tablet Take 25 mg by mouth daily. Provider, Historical   omega-3 fatty acids/fish oil (THERAGRAN-M PO) Take 1 Tab by mouth daily. Provider, Historical   warfarin (COUMADIN) 6 mg tablet Take 6 mg by mouth daily. Provider, Historical   colesevelam (WELCHOL) 625 mg tablet Take 1,250 mg by mouth two (2) times daily (with meals). Take 2 tabs by mouth daily    Provider, Historical   co-enzyme Q-10 (CO Q-10) 100 mg capsule Take 100 mg by mouth daily. Provider, Historical   brimonidine (ALPHAGAN P) 0.1 % ophthalmic solution Administer 1 Drop to both eyes every eight (8) hours. Provider, Historical     Allergies   Allergen Reactions    Statins-Hmg-Coa Reductase Inhibitors Other (comments)       ROS Review of all systems is negative except as noted above in the HPI. Objective:   Vital Signs: (As obtained by patient/caregiver at home)  There were no vitals taken for this visit. Constitutional: [x] Appears well-developed and well-nourished [x] No apparent distress      Mental status: [x] Alert and awake  [x] Oriented to person/place/time [x] Able to follow commands      Pulmonary/Chest: [x] Respiratory effort normal   [x] No visualized signs of difficulty breathing or respiratory distress  Musculoskeletal: Bilateral hands discomfort still making a fist         Neurological:        [x] No Facial Asymmetry         Psychiatric:       [x] Normal Affect    We discussed the expected course, resolution and complications of the diagnosis(es) in detail. Medication risks, benefits, costs, interactions, and alternatives were discussed as indicated. I advised him to contact the office if his condition worsens, changes or fails to improve as anticipated. He expressed understanding with the diagnosis(es) and plan. Soundra Landau is a 71 y.o. male being evaluated by a video visit encounter for concerns as above. A caregiver was present when appropriate. Due to this being a TeleHealth encounter (During [address] emergency), evaluation of the following organ systems was limited: Vitals/Constitutional/EENT/Resp/CV/GI//MS/Neuro/Skin/Heme-Lymph-Imm. Pursuant to the emergency declaration under the [facility] waiver authority and the [employer] and [employer] Act, this Virtual  Visit was conducted, with patient's (and/or legal guardian's) consent, to reduce the patient's risk of exposure to COVID-19 and provide necessary medical care. Services were provided through a video synchronous discussion virtually to substitute for in-person clinic visit. Patient and provider were located at their individual homes.         Jameel Reza MD

## 2020-04-14 NOTE — TELEPHONE ENCOUNTER
Pt called and stated his insurance will only cover his test strips if he is instructed to test only once a day. Dr Rahel Guzman needs to resend the prescription with the instructions to test once daily.  Please be advised and contact for clarification

## 2020-06-19 ENCOUNTER — OFFICE VISIT (OUTPATIENT)
Dept: ORTHOPEDIC SURGERY | Age: 65
End: 2020-06-19

## 2020-06-19 VITALS
DIASTOLIC BLOOD PRESSURE: 76 MMHG | HEIGHT: 69 IN | TEMPERATURE: 98.6 F | WEIGHT: 225.4 LBS | BODY MASS INDEX: 33.38 KG/M2 | SYSTOLIC BLOOD PRESSURE: 112 MMHG | OXYGEN SATURATION: 95 % | HEART RATE: 74 BPM | RESPIRATION RATE: 16 BRPM

## 2020-06-19 DIAGNOSIS — M18.0 PRIMARY OSTEOARTHRITIS OF BOTH FIRST CARPOMETACARPAL JOINTS: Primary | ICD-10-CM

## 2020-06-19 NOTE — PATIENT INSTRUCTIONS
Learning About Arthritis at the [facility] of the Thumb What is it? Arthritis at the base of the thumb joint is wear and tear on the cartilage. Cartilage is a firm, thick, slippery tissue. It covers and protects the ends of bones where they meet to form a joint. When you have arthritis, there are changes in the cartilage that cause it to break down. The bones rub together and cause joint damage and pain. What causes it? Experts don't know what causes arthritis at the base of the thumb. But aging, a lot of use, an injury, or family history may play a part. What are the symptoms? Symptoms of arthritis at the base of the thumb include aching in your joint. Or the pain may feel burning or sharp. You may feel clicking, creaking, or catching in the joint. It may get stiff. You may have more pain and less strength when you pinch or  things. Symptoms may come and go, stay the same, or get worse over time. How is it diagnosed? Your doctor can often diagnose arthritis by asking you questions about your joint pain and other symptoms and examining you. You may also have X-rays and blood tests. Blood tests can help make sure another disease isn't causing your symptoms. How is it treated? Arthritis at the base of your thumb may be treated with rest, pain relievers, steroid medicines, using a brace or splint, andin some casessurgery. To help relieve pain in the joint, rest your sore hand. Switch hands for some activities. You can try heat and cold therapy, such as hot compresses, paraffin wax, cold packs, or ice massage. Your doctor may give you a splint to wear during some activities or when pain flares up. You can often manage mild or moderate arthritis pain with over-the-counter pain relievers. These include medicines that reduce swelling, such as ibuprofen or naproxen. You can also use acetaminophen. Sometimes these medicines are in creams that you can rub on your thumb and hand.  Your doctor may also prescribe other medicine for your pain. For some people, steroid shots may be an option. If none of the treatments work, your doctor may discuss surgery with you. Follow-up care is a key part of your treatment and safety. Be sure to make and go to all appointments, and call your doctor if you are having problems. It's also a good idea to know your test results and keep a list of the medicines you take. Where can you learn more? Go to http://renetta-zachary.info/ Enter T110 in the search box to learn more about \"Learning About Arthritis at the [facility] of the Thumb. \" Current as of: December 9, 2019               Content Version: 12.5 © 8029-4686 [employer]. Care instructions adapted under license by [employer] (which disclaims liability or warranty for this information). If you have questions about a medical condition or this instruction, always ask your healthcare professional. [address] any warranty or liability for your use of this information.

## 2020-06-19 NOTE — PROGRESS NOTES
Keerthi Hernandez is a 71 y.o. male right handed retiree. Worker's Compensation and legal considerations: not known. Vitals:    06/19/20 1054   BP: 112/76   Pulse: 74   Resp: 16   Temp: 98.6 °F (37 °C)   TempSrc: Skin   SpO2: 95%   Weight: 225 lb 6.4 oz (102.2 kg)   Height: 5' 9\" (1.753 m)   PainSc:   3   PainLoc: Wrist           Chief Complaint   Patient presents with    Wrist Pain     bilateral         HPI: Bilateral Thumb base pain    Date of onset:  indeterminate    Injury: No    Prior Treatment:  No    Numbness/ Tingling: No      ROS: Review of Systems - General ROS: negative  Psychological ROS: negative  ENT ROS: negative  Allergy and Immunology ROS: negative  Hematological and Lymphatic ROS: negative  Respiratory ROS: no cough, shortness of breath, or wheezing  Cardiovascular ROS: no chest pain or dyspnea on exertion  Gastrointestinal ROS: no abdominal pain, change in bowel habits, or black or bloody stools  Musculoskeletal ROS: positive for - pain in thumb - bilateral  Neurological ROS: negative  Dermatological ROS: negative    Past Medical History:   Diagnosis Date    Bypass graft mechanical complication (HCC)     quadropple    Diabetes ([facility] [address] [facility]     Glaucoma     HTN (hypertension)     Hyperlipemia        Past Surgical History:   Procedure Laterality Date    HX COLONOSCOPY  7 years ago    HX KNEE REPLACEMENT  2017    rigjt and left replacement        Current Outpatient Medications   Medication Sig Dispense Refill    triamcinolone acetonide (KENALOG) 10 mg/mL injection 1 mL by Intra artICUlar route once for 1 dose. 1 Vial 0    glucose blood VI test strips (blood glucose test) strip Check blood glucose  daily. Patient would like One Touch Yashira test strips 100 Strip 3    lancets misc Check blood glucose  daily. Patient would like One Touch Delica Plus Lancets 721 Each 3    isosorbide mononitrate ER (IMDUR) 30 mg tablet Take 30 mg by mouth daily.       colesevelam (WELCHOL) 625 mg tablet Take 1,250 mg by mouth daily.  metoprolol succinate (TOPROL-XL) 100 mg tablet Take 100 mg by mouth nightly.  metFORMIN ER (GLUCOPHAGE XR) 500 mg tablet Take 1 Tab by mouth two (2) times daily (after meals). 180 Tab 1    SITagliptin (Januvia) 100 mg tablet Take 1 Tab by mouth daily. 90 Tab 1    gabapentin (NEURONTIN) 100 mg capsule Take 1 Cap by mouth three (3) times daily. Max Daily Amount: 300 mg. 90 Cap 3    metoprolol succinate (TOPROL-XL) 100 mg tablet Take 1 Tab by mouth nightly. 30 Tab 2    melatonin 3 mg tablet Take 3 mg by mouth as needed.  polyethylene glycol (MIRALAX) 17 gram/dose powder Take 17 g by mouth as needed.  aspirin delayed-release 81 mg tablet Take 81 mg by mouth daily.  ferrous sulfate 325 mg (65 mg iron) tablet Take 325 mg by mouth every other day. Take 1 tab every other day      furosemide (LASIX) 20 mg tablet Take 20 mg by mouth daily. Take 20mg am       magnesium oxide (MAG-OX) 400 mg tablet Take 400 mg by mouth daily.  omeprazole (PRILOSEC OTC) 20 mg tablet Take 20 mg by mouth daily.  rosuvastatin (CRESTOR) 10 mg tablet Take 10 mg by mouth every Monday, Wednesday, Friday.  senna-docusate (SENNA WITH DOCUSATE SODIUM) 8.6-50 mg per tablet Take 1 Tab by mouth daily.  spironolactone (ALDACTONE) 25 mg tablet Take 25 mg by mouth daily.  omega-3 fatty acids/fish oil (THERAGRAN-M PO) Take 1 Tab by mouth daily.  warfarin (COUMADIN) 6 mg tablet Take 6 mg by mouth daily.  colesevelam (WELCHOL) 625 mg tablet Take 1,250 mg by mouth two (2) times daily (with meals). Take 2 tabs by mouth daily      co-enzyme Q-10 (CO Q-10) 100 mg capsule Take 100 mg by mouth daily.  brimonidine (ALPHAGAN P) 0.1 % ophthalmic solution Administer 1 Drop to both eyes every eight (8) hours. Allergies   Allergen Reactions    Statins-Hmg-Coa Reductase Inhibitors Other (comments)           PE:     Physical Exam  Vitals signs and nursing note reviewed. Constitutional:       General: He is not in acute distress. Appearance: Normal appearance. He is not ill-appearing, toxic-appearing or diaphoretic. Eyes:      Pupils: Pupils are equal, round, and reactive to light. Neck:      Musculoskeletal: Normal range of motion. Cardiovascular:      Pulses: Normal pulses. Pulmonary:      Effort: Pulmonary effort is normal. No respiratory distress. Abdominal:      General: Abdomen is flat. Musculoskeletal: Normal range of motion. General: Swelling and tenderness present. No deformity or signs of injury. Right lower leg: No edema. Left lower leg: No edema. Skin:     General: Skin is warm and dry. Capillary Refill: Capillary refill takes less than 2 seconds. Neurological:      General: No focal deficit present. Mental Status: He is alert and oriented to person, place, and time. Psychiatric:         Mood and Affect: Mood normal.         Behavior: Behavior normal.            Hand:    Examination L Digit(s) R Digit(s)   1st CMC Tenderness ++  +    1st CMC Grind ++  +    Kunal Nodes -  -    Heberden Nodes -  -    A1 Pulley Tenderness -  -    Triggering -  -    UCL Instability -  -    RCL Instability -  -    Lateral Stress Pain -  -    Palmar Cords -  -    Tabletop test -  -    Garrod's Pads -  -     Strength       Pinch Strength         ROM: Full      Wrist:    Tenderness L R Test L R   1st Ext Comp - - Finkelstein's - -   Snuff Box - - Agustin - -   2nd Ext Comp - - S-L Shear - -   S-L Joint - - L-T Shear - -   L-T Joint - - DRUJ Sup - -   6th Ext Comp - - DRUJ Pro - -   Ulnar Snuff - - DRUJ Grind - -   Fovea - - TFCC - -   STT Joint - - Mid-Carp Inst - -   FCR - - P-T Grind - -   Intersection - - ECU Sublux. - -      Dorsal Ganglion: -   Volar Ganglion: -      ROM: Full        Imagin2020 plain films of bilateral wrists + for thumb CMC OA, eaton 3 on left and 2 on right        ICD-10-CM ICD-9-CM    1.  Primary osteoarthritis of both first carpometacarpal joints M18.0 715.14 AMB POC XRAY, WRIST; COMPLETE, 3+ VIE      AMB POC XRAY, WRIST; COMPLETE, 3+ VIE      TRIAMCINOLONE ACETONIDE INJ      triamcinolone acetonide (KENALOG) 10 mg/mL injection      DRAIN/INJECT SMALL JOINT/BURSA      AMB SUPPLY ORDER         Plan:     Bilateral Thumb CMC Injections and Braces    Follow-up and Dispositions    · Return if symptoms worsen or fail to improve. Plan was reviewed with patient, who verbalized agreement and understanding of the plan    Kiesha [address] NOTE        Chart reviewed for the following:   Hao AVILA DO, have reviewed the History, Physical and updated the Allergic reactions for [address] performed immediately prior to start of procedure:   Caitlin AVILA DO, have performed the following reviews on Mat Stands prior to the start of the procedure:            * Patient was identified by name and date of birth   * Agreement on procedure being performed was verified  * Risks and Benefits explained to the patient  * Procedure site verified and marked as necessary  * Patient was positioned for comfort  * Consent was signed and verified     Time: 11:25 AM      Date of procedure: 6/19/2020    Procedure performed by: Caitlin Warren DO    Provider assisted by: Latisha Ruiz LPN    Patient assisted by: self    How tolerated by patient: tolerated the procedure well with no complications    Post Procedural Pain Scale: 0 - No Hurt    Comments: none    Procedure:  After consent was obtained, using sterile technique the joint was prepped. Local anesthetic used: 1% lidocaine. Kenalog 5 mg X2 and was then injected and the needle withdrawn. The procedure was well tolerated. The patient is asked to continue to rest the area for a few more days before resuming regular activities. It may be more painful for the first 1-2 days.   Watch for fever, or increased swelling or persistent pain in the joint. Call or return to clinic prn if such symptoms occur or there is failure to improve as anticipated.

## 2020-06-29 ENCOUNTER — TELEPHONE (OUTPATIENT)
Dept: FAMILY MEDICINE CLINIC | Age: 65
End: 2020-06-29

## 2020-06-29 DIAGNOSIS — E11.65 TYPE 2 DIABETES MELLITUS WITH HYPERGLYCEMIA, WITHOUT LONG-TERM CURRENT USE OF INSULIN (HCC): ICD-10-CM

## 2020-06-29 NOTE — TELEPHONE ENCOUNTER
From his chart record I see he is also on Crestor which should help with his cholesterol.   Margie Plunkett MD

## 2020-06-29 NOTE — TELEPHONE ENCOUNTER
Please let patient know Januvia and WelChol for 2 different things. Magno Porrasberg is given for diabetes. WelChol would be for cholesterol. He would have needed to stop the WelChol prior to surgery. He should continue with Januvia. I will send in a refill of this.   Ester Monteiro MD

## 2020-06-29 NOTE — TELEPHONE ENCOUNTER
Spoke with patient at this time. Patient is requesting to stop taking [place] and [place] due to cost and increase the welchol to 2 tab twice a day. Patient states this change was made before his bypass in the hospital back in [address] [place] [address] patient this made have to be discuss at appointment. Patient states if I could submit the request because if his request is denied then he will need a refill of the [place]. Please Advise

## 2020-06-29 NOTE — TELEPHONE ENCOUNTER
Pt want to know if he can stop Bob and go back to his previous medication Welchol (Coledelam) before his surgery.

## 2020-06-29 NOTE — TELEPHONE ENCOUNTER
Spoke with patient. Patient states he will discuss further at office visit. Patient states he would like a 30 day supply of [place] and [place]. Will call pharmacy to change to 30 day supply

## 2020-07-16 ENCOUNTER — HOSPITAL ENCOUNTER (OUTPATIENT)
Dept: LAB | Age: 65
Discharge: HOME | End: 2020-07-16
Payer: MEDICARE

## 2020-07-16 ENCOUNTER — OFFICE VISIT (OUTPATIENT)
Dept: FAMILY MEDICINE CLINIC | Age: 65
End: 2020-07-16

## 2020-07-16 VITALS
HEIGHT: 69 IN | WEIGHT: 221 LBS | SYSTOLIC BLOOD PRESSURE: 101 MMHG | OXYGEN SATURATION: 95 % | HEART RATE: 85 BPM | TEMPERATURE: 99.5 F | BODY MASS INDEX: 32.73 KG/M2 | DIASTOLIC BLOOD PRESSURE: 68 MMHG | RESPIRATION RATE: 18 BRPM

## 2020-07-16 DIAGNOSIS — I25.10 CORONARY ARTERY DISEASE DUE TO LIPID RICH PLAQUE: ICD-10-CM

## 2020-07-16 DIAGNOSIS — I10 ESSENTIAL HYPERTENSION: ICD-10-CM

## 2020-07-16 DIAGNOSIS — M79.642 PAIN IN BOTH HANDS: ICD-10-CM

## 2020-07-16 DIAGNOSIS — M79.641 PAIN IN BOTH HANDS: ICD-10-CM

## 2020-07-16 DIAGNOSIS — E11.9 COMPREHENSIVE DIABETIC FOOT EXAMINATION, TYPE 2 DM, ENCOUNTER FOR (HCC): ICD-10-CM

## 2020-07-16 DIAGNOSIS — E11.65 TYPE 2 DIABETES MELLITUS WITH HYPERGLYCEMIA, WITHOUT LONG-TERM CURRENT USE OF INSULIN (HCC): Primary | ICD-10-CM

## 2020-07-16 DIAGNOSIS — Z12.11 SCREEN FOR COLON CANCER: ICD-10-CM

## 2020-07-16 DIAGNOSIS — I25.708 CORONARY ARTERY DISEASE OF BYPASS GRAFT OF NATIVE HEART WITH STABLE ANGINA PECTORIS (HCC): ICD-10-CM

## 2020-07-16 DIAGNOSIS — E11.65 TYPE 2 DIABETES MELLITUS WITH HYPERGLYCEMIA, WITHOUT LONG-TERM CURRENT USE OF INSULIN (HCC): ICD-10-CM

## 2020-07-16 DIAGNOSIS — G62.9 NEUROPATHY: ICD-10-CM

## 2020-07-16 DIAGNOSIS — Z23 ENCOUNTER FOR IMMUNIZATION: ICD-10-CM

## 2020-07-16 DIAGNOSIS — I25.83 CORONARY ARTERY DISEASE DUE TO LIPID RICH PLAQUE: ICD-10-CM

## 2020-07-16 DIAGNOSIS — E78.5 DYSLIPIDEMIA: ICD-10-CM

## 2020-07-16 DIAGNOSIS — K21.9 GASTROESOPHAGEAL REFLUX DISEASE, ESOPHAGITIS PRESENCE NOT SPECIFIED: ICD-10-CM

## 2020-07-16 DIAGNOSIS — Z13.5 SCREENING FOR GLAUCOMA: ICD-10-CM

## 2020-07-16 PROBLEM — I25.118 CORONARY ARTERY DISEASE WITH STABLE ANGINA PECTORIS (HCC): Status: ACTIVE | Noted: 2020-07-16

## 2020-07-16 PROCEDURE — 85025 COMPLETE CBC W/AUTO DIFF WBC: CPT

## 2020-07-16 PROCEDURE — 80053 COMPREHEN METABOLIC PANEL: CPT

## 2020-07-16 PROCEDURE — 80061 LIPID PANEL: CPT

## 2020-07-16 PROCEDURE — 83735 ASSAY OF MAGNESIUM: CPT

## 2020-07-16 PROCEDURE — 36415 COLL VENOUS BLD VENIPUNCTURE: CPT

## 2020-07-16 PROCEDURE — 83036 HEMOGLOBIN GLYCOSYLATED A1C: CPT

## 2020-07-16 RX ORDER — LANCETS
EACH MISCELLANEOUS
Qty: 100 EACH | Refills: 3 | Status: SHIPPED | OUTPATIENT
Start: 2020-07-16

## 2020-07-16 NOTE — PROGRESS NOTES
Chief Complaint   Patient presents with    Follow-up    Medication Evaluation    Shortness of Breath     cough,fatigue seen cardio on 07/10/20     1. Have you been to the ER, urgent care clinic since your last visit? Hospitalized since your last visit? No    2. Have you seen or consulted any other health care providers outside of the [address] [place] [address] since your last visit? Include any pap smears or colon screening. No     HPI  Erlinda Nixon comes in for f/u care. SOB: Patient has episodes of shortness of breath that comes on and off. It is noticed with exertion and also at rest.  Feels like his chest is congested. He also has malaise and fatigue. The shortness of breath is with minimal exertion. He is a patient who has had quadruple bypass. He saw his cardiologist who plans to do a nuclear stress test.  We discussed other causes of shortness of breath and cough. He is on lisinopril and this could cause a dry irritating cough that would not give the malaise and fatigue. He would prefer to hold off on doing a chest x-ray at the moment. He will follow-up with me after he has had the nuclear stress test done. He  is on furosemide. This was increased to 40 mg twice daily. This has caused some improvement in the shortness of breath. CAD: Patient has a history of coronary artery disease and has had quadruple bypass. Does have fatigue as noted above. He is being followed up by the cardiologist.  He is on Imdur, metoprolol, furosemide, Crestor, spironolactone and he takes Coumadin. He will continue with medical management as recommended by the cardiologist.  We will recheck renal functions given he is on the Lasix. This was increased to 40 mg twice a day. I will also check his magnesium levels. DM2: Patient has type 2 diabetes mellitus. We will recheck labs today. States that his blood glucose levels have been stable. He is on metformin and Januvia. He comes in with a blood glucose log. Patient states that Januvia is expensive and since his blood glucose used to be stable in the past he would want to get off the Januvia. We will have him hold off on the Januvia. He will continue with the metformin 500 mg twice a day with meals. He will keep a log and follow-up with me at next visit. I will check his HbA1c today. I did a foot exam that is stable. Hypertension: Patient has hypertension. Blood pressure is stable. He is on spironolactone, metoprolol. He denies headache, changes in vision or focal weakness. We will continue with the current treatment plan. Dyslipidemia: Patient has dyslipidemia. He is on Crestor 10 mg daily. Tolerating medication. I will recheck lipid panel. Patient also takes WelChol and fish oil daily. Neuropathy: Patient has neuropathy with numbness and tingling of the hands and the feet. He is on gabapentin. We will continue with medication. Insomnia: Patient has insomnia. Takes melatonin 3 mg daily. We will continue with the current treatment plan. Obesity: Patient has a BMI of 32.64. He will intensify lifestyle and dietary modification. Health maintenance: Patient will have pneumococcal vaccine given today. He has had a colonoscopy done. He states that he is due to have this done soon. We will request his previous records from [place]. I will place referral for him to be seen by the gastroenterologist.  Patient also needs to have glaucoma screening done and referral to the ophthalmologist is made. He is yet to get his Shingrix vaccine. We will discuss this and he may need to get a prescription for the same. Hand pain: Patient has pain both hands. He has bilateral de Quervain's syndrome and has been followed up by the hand specialist.  He wears wrist splints and this does help with symptoms. He will continue with management as per the specialist.  GERD: Patient has heartburn that comes on and off.   He denies nausea, vomiting, dark stools or hematemesis. Advised to continue taking the omeprazole. Anticoagulation: Patient is on warfarin for anticoagulation. He is followed up by the cardiologist.  Currently takes 9 mg 2 days a week and 6 mg for the rest of the days. He wonders about different medications that may not need constant monitoring. He will talk to his cardiologist about either Xarelto or Eliquis and find out if he is suitable for the medication. He is also concerned about cost.    Past Medical History  Past Medical History:   Diagnosis Date    Bypass graft mechanical complication ([address]     quadropple    Diabetes ([facility] [address]     Glaucoma     HTN (hypertension)     Hyperlipemia        Surgical History  Past Surgical History:   Procedure Laterality Date    HX COLONOSCOPY  7 years ago    HX KNEE REPLACEMENT  2017    rigjt and left replacement         Medications  Current Outpatient Medications   Medication Sig Dispense Refill    SITagliptin (Januvia) 100 mg tablet Take 1 Tab by mouth daily. 90 Tab 1    glucose blood VI test strips (blood glucose test) strip Check blood glucose  daily. Patient would like One Touch Yashira test strips 100 Strip 3    lancets misc Check blood glucose  daily. Patient would like One Touch Delica Plus Lancets 323 Each 3    isosorbide mononitrate ER (IMDUR) 30 mg tablet Take 30 mg by mouth daily.  metoprolol succinate (TOPROL-XL) 100 mg tablet Take 100 mg by mouth nightly.  metFORMIN ER (GLUCOPHAGE XR) 500 mg tablet Take 1 Tab by mouth two (2) times daily (after meals). 180 Tab 1    gabapentin (NEURONTIN) 100 mg capsule Take 1 Cap by mouth three (3) times daily. Max Daily Amount: 300 mg. 90 Cap 3    metoprolol succinate (TOPROL-XL) 100 mg tablet Take 1 Tab by mouth nightly. 30 Tab 2    melatonin 3 mg tablet Take 3 mg by mouth as needed.  polyethylene glycol (MIRALAX) 17 gram/dose powder Take 17 g by mouth as needed.  aspirin delayed-release 81 mg tablet Take 81 mg by mouth daily.       ferrous sulfate 325 mg (65 mg iron) tablet Take 325 mg by mouth every other day. Take 1 tab every other day      furosemide (LASIX) 20 mg tablet Take 20 mg by mouth two (2) times a day. Take 2 tab      magnesium oxide (MAG-OX) 400 mg tablet Take 400 mg by mouth daily.  omeprazole (PRILOSEC OTC) 20 mg tablet Take 20 mg by mouth daily.  rosuvastatin (CRESTOR) 10 mg tablet Take 10 mg by mouth every Monday, Wednesday, Friday.  senna-docusate (SENNA WITH DOCUSATE SODIUM) 8.6-50 mg per tablet Take 1 Tab by mouth daily.  spironolactone (ALDACTONE) 25 mg tablet Take 25 mg by mouth daily.  omega-3 fatty acids/fish oil (THERAGRAN-M PO) Take 1 Tab by mouth daily.  warfarin (COUMADIN) 6 mg tablet Take 6 mg by mouth daily.  co-enzyme Q-10 (CO Q-10) 100 mg capsule Take 100 mg by mouth daily.  brimonidine (ALPHAGAN P) 0.1 % ophthalmic solution Administer 1 Drop to both eyes every eight (8) hours.  colesevelam (WELCHOL) 625 mg tablet Take 1,250 mg by mouth daily. Allergies  Allergies   Allergen Reactions    Statins-Hmg-Coa Reductase Inhibitors Other (comments)       Family History  Family History   Problem Relation Age of Onset    Heart Disease Sister     Heart Attack Brother     Cancer Brother     Anemia Brother        Social History  Social History     Socioeconomic History    Marital status:      Spouse name: Not on file    Number of children: Not on file    Years of education: Not on file    Highest education level: Not on file   Occupational History    Not on file   Social Needs    Financial resource strain: Not on file    Food insecurity     Worry: Not on file     Inability: Not on file    Transportation needs     Medical: Not on file     Non-medical: Not on file   Tobacco Use    Smoking status: Former Smoker    Smokeless tobacco: Never Used   Substance and Sexual Activity    Alcohol use:  Yes     Alcohol/week: 1.0 standard drinks     Types: 1 Shots of liquor per week    Drug use: Never    Sexual activity: Yes     Partners: Female   Lifestyle    Physical activity     Days per week: Not on file     Minutes per session: Not on file    Stress: Not on file   Relationships    Social connections     Talks on phone: Not on file     Gets together: Not on file     Attends Jewish service: Not on file     Active member of club or organization: Not on file     Attends meetings of clubs or organizations: Not on file     Relationship status: Not on file    Intimate partner violence     Fear of current or ex partner: Not on file     Emotionally abused: Not on file     Physically abused: Not on file     Forced sexual activity: Not on file   Other Topics Concern    Not on file   Social History Narrative    Not on file       Review of Systems  Review of Systems - Review of all systems is negative except as noted above in the HPI.     Vital Signs  Visit Vitals  /68 (BP 1 Location: Left arm, BP Patient Position: Sitting)   Pulse 85   Temp 99.5 °F (37.5 °C) (Oral)   Resp 18   Ht 5' 9\" (1.753 m)   Wt 221 lb (100.2 kg)   SpO2 95%   BMI 32.64 kg/m²         Physical Exam  Physical Examination: General appearance - oriented to person, place, and time, overweight, acyanotic, in no respiratory distress and well hydrated  Mental status - alert, oriented to person, place, and time, affect appropriate to mood  Mouth - mucous membranes moist, pharynx normal without lesions  Neck - supple, no significant adenopathy  Lymphatics - no palpable lymphadenopathy, no hepatosplenomegaly  Chest - clear to auscultation, no wheezes, rales or rhonchi, symmetric air entry  Heart - systolic murmur 3/6 at 2nd left intercostal space and at 2nd right intercostal space  Abdomen - no rebound tenderness noted  Back exam - limited range of motion  Neurological - neck supple without rigidity, abnormal neurological exam unchanged from prior examinations  Musculoskeletal - osteoarthritic changes noted in both hands  Extremities - no pedal edema noted, intact peripheral pulses    Diabetic foot exam:     Left Foot:   Visual Exam: normal    Pulse DP: 2+ (normal)   Filament test: normal sensation        Right Foot:   Visual Exam: normal    Pulse DP: 2+ (normal)   Filament test: normal sensation      Results  Results for orders placed or performed during the hospital encounter of 03/19/20   CBC WITH AUTOMATED DIFF   Result Value Ref Range    WBC 5.8 4.6 - 13.2 K/uL    RBC 4.79 4.70 - 5.50 M/uL    HGB 14.5 13.0 - 16.0 g/dL    HCT 44.8 36.0 - 48.0 %    MCV 93.5 74.0 - 97.0 FL    MCH 30.3 24.0 - 34.0 PG    MCHC 32.4 31.0 - 37.0 g/dL    RDW 14.5 11.6 - 14.5 %    PLATELET 970 461 - 702 K/uL    MPV 9.8 9.2 - 11.8 FL    NEUTROPHILS 62 40 - 73 %    LYMPHOCYTES 21 21 - 52 %    MONOCYTES 15 (H) 3 - 10 %    EOSINOPHILS 2 0 - 5 %    BASOPHILS 0 0 - 2 %    ABS. NEUTROPHILS 3.6 1.8 - 8.0 K/UL    ABS. LYMPHOCYTES 1.2 0.9 - 3.6 K/UL    ABS. MONOCYTES 0.9 0.05 - 1.2 K/UL    ABS. EOSINOPHILS 0.1 0.0 - 0.4 K/UL    ABS. BASOPHILS 0.0 0.0 - 0.1 K/UL    DF AUTOMATED     METABOLIC PANEL, COMPREHENSIVE   Result Value Ref Range    Sodium 140 136 - 145 mmol/L    Potassium 4.7 3.5 - 5.5 mmol/L    Chloride 109 100 - 111 mmol/L    CO2 24 21 - 32 mmol/L    Anion gap 7 3.0 - 18 mmol/L    Glucose 119 (H) 74 - 99 mg/dL    BUN 16 7.0 - 18 MG/DL    Creatinine 0.99 0.6 - 1.3 MG/DL    BUN/Creatinine ratio 16 12 - 20      GFR est AA >60 >60 ml/min/1.73m2    GFR est non-AA >60 >60 ml/min/1.73m2    Calcium 9.2 8.5 - 10.1 MG/DL    Bilirubin, total 0.5 0.2 - 1.0 MG/DL    ALT (SGPT) 30 16 - 61 U/L    AST (SGOT) 23 10 - 38 U/L    Alk.  phosphatase 72 45 - 117 U/L    Protein, total 7.3 6.4 - 8.2 g/dL    Albumin 3.9 3.4 - 5.0 g/dL    Globulin 3.4 2.0 - 4.0 g/dL    A-G Ratio 1.1 0.8 - 1.7     LIPID PANEL   Result Value Ref Range    LIPID PROFILE          Cholesterol, total 177 <200 MG/DL    Triglyceride 171 (H) <150 MG/DL    HDL Cholesterol 40 40 - 60 MG/DL    LDL, calculated 102.8 (H) 0 - 100 MG/DL    VLDL, calculated 34.2 MG/DL    CHOL/HDL Ratio 4.4 0 - 5.0     HEMOGLOBIN A1C WITH EAG   Result Value Ref Range    Hemoglobin A1c 6.3 (H) 4.2 - 5.6 %    Est. average glucose 134 mg/dL   HEPATITIS C AB   Result Value Ref Range    Hepatitis C virus Ab <0.02 <0.80 Index    Hep C  virus Ab Interp. NEGATIVE  NEG      Hep C  virus Ab comment             ASSESSMENT and PLAN    ICD-10-CM ICD-9-CM    1. Type 2 diabetes mellitus with hyperglycemia, without long-term current use of insulin (HCC)  E11.65 250.00 REFERRAL TO OPHTHALMOLOGY     790.29  DIABETES FOOT EXAM      LIPID PANEL      CBC WITH AUTOMATED DIFF      METABOLIC PANEL, COMPREHENSIVE      HEMOGLOBIN A1C WITH EAG      COLLECTION VENOUS BLOOD,VENIPUNCTURE   2. Screening for glaucoma  Z13.5 V80.1 REFERRAL TO OPHTHALMOLOGY      COLLECTION VENOUS BLOOD,VENIPUNCTURE   3. Neuropathy  G62.9 355.9    4. Pain in both hands  M79.641 729.5     M79.642     5. Comprehensive diabetic foot examination, type 2 DM, encounter for ([facility]  E11.9 250.00  DIABETES FOOT EXAM   6. Coronary artery disease due to lipid rich plaque  I25.10 414.00 MAGNESIUM    I25.83 414.3 COLLECTION VENOUS BLOOD,VENIPUNCTURE   7. Encounter for immunization  Z23 V03.89 PNEUMOCOCCAL POLYSACCHARIDE VACCINE, 23-VALENT, ADULT OR IMMUNOSUPPRESSED PT DOSE,      ADMIN PNEUMOCOCCAL VACCINE   8. Screen for colon cancer  Z12.11 V76.51 REFERRAL TO GASTROENTEROLOGY   9. Gastroesophageal reflux disease, esophagitis presence not specified  K21.9 530.81    10. Dyslipidemia  E78.5 272.4    11. Essential hypertension  I10 401.9    12.  Coronary artery disease of bypass graft of native heart with stable angina pectoris (HCC)  I25.708 414.05      413.9      lab results and schedule of future lab studies reviewed with patient  reviewed diet, exercise and weight control  cardiovascular risk and specific lipid/LDL goals reviewed  reviewed medications and side effects in detail  specific diabetic recommendations: low cholesterol diet, weight control and daily exercise discussed, home glucose monitoring emphasized, all medications, side effects and compliance discussed carefully, foot care discussed and Podiatry visits discussed, annual eye examinations at Ophthalmology discussed, glycohemoglobin and other lab monitoring discussed and long term diabetic complications discussed  radiology results and schedule of future radiology studies reviewed with patient      I have discussed the diagnosis with the patient and the intended plan of care as seen in the above orders. The patient has received an after-visit summary and questions were answered concerning future plans. I have discussed medication, side effects, and warnings with the patient in detail. The patient verbalized understanding and is in agreement with the plan of care. The patient will contact the office with any additional concerns. I spent at least 50 minutes on this visit with this established patient. Blanca Bates MD    PLEASE NOTE:   This document has been produced using voice recognition software.  Unrecognized errors in transcription may be present

## 2020-07-16 NOTE — PROGRESS NOTES
Venipuncture to patient left arm at this time. Patient tolerated well. Labs ordered by PCP at this time. PPSV23 ordered by PCP at this time. Injection given to patient left deltoid at this time. No concerns noted. Patient tolerated well

## 2020-07-17 LAB
ALBUMIN SERPL-MCNC: 4.2 G/DL (ref 3.4–5)
ALBUMIN/GLOB SERPL: 1.3 {RATIO} (ref 0.8–1.7)
ALP SERPL-CCNC: 66 U/L (ref 45–117)
ALT SERPL-CCNC: 31 U/L (ref 16–61)
ANION GAP SERPL CALC-SCNC: 8 MMOL/L (ref 3–18)
AST SERPL-CCNC: 16 U/L (ref 10–38)
BASOPHILS # BLD: 0 K/UL (ref 0–0.1)
BASOPHILS NFR BLD: 0 % (ref 0–2)
BILIRUB SERPL-MCNC: 0.5 MG/DL (ref 0.2–1)
BUN SERPL-MCNC: 23 MG/DL (ref 7–18)
BUN/CREAT SERPL: 21 (ref 12–20)
CALCIUM SERPL-MCNC: 9.6 MG/DL (ref 8.5–10.1)
CHLORIDE SERPL-SCNC: 108 MMOL/L (ref 100–111)
CHOLEST SERPL-MCNC: 203 MG/DL
CO2 SERPL-SCNC: 26 MMOL/L (ref 21–32)
CREAT SERPL-MCNC: 1.12 MG/DL (ref 0.6–1.3)
DIFFERENTIAL METHOD BLD: ABNORMAL
EOSINOPHIL # BLD: 0.1 K/UL (ref 0–0.4)
EOSINOPHIL NFR BLD: 1 % (ref 0–5)
ERYTHROCYTE [DISTWIDTH] IN BLOOD BY AUTOMATED COUNT: 14.1 % (ref 11.6–14.5)
EST. AVERAGE GLUCOSE BLD GHB EST-MCNC: 131 MG/DL
GLOBULIN SER CALC-MCNC: 3.3 G/DL (ref 2–4)
GLUCOSE SERPL-MCNC: 97 MG/DL (ref 74–99)
HBA1C MFR BLD: 6.2 % (ref 4.2–5.6)
HCT VFR BLD AUTO: 46.5 % (ref 36–48)
HDLC SERPL-MCNC: 40 MG/DL (ref 40–60)
HDLC SERPL: 5.1 {RATIO} (ref 0–5)
HGB BLD-MCNC: 15.1 G/DL (ref 13–16)
LDLC SERPL CALC-MCNC: 109.4 MG/DL (ref 0–100)
LIPID PROFILE,FLP: ABNORMAL
LYMPHOCYTES # BLD: 1.4 K/UL (ref 0.9–3.6)
LYMPHOCYTES NFR BLD: 21 % (ref 21–52)
MAGNESIUM SERPL-MCNC: 2.3 MG/DL (ref 1.6–2.6)
MCH RBC QN AUTO: 31.9 PG (ref 24–34)
MCHC RBC AUTO-ENTMCNC: 32.5 G/DL (ref 31–37)
MCV RBC AUTO: 98.3 FL (ref 74–97)
MONOCYTES # BLD: 0.8 K/UL (ref 0.05–1.2)
MONOCYTES NFR BLD: 12 % (ref 3–10)
NEUTS SEG # BLD: 4.3 K/UL (ref 1.8–8)
NEUTS SEG NFR BLD: 66 % (ref 40–73)
PLATELET # BLD AUTO: 234 K/UL (ref 135–420)
PMV BLD AUTO: 10.7 FL (ref 9.2–11.8)
POTASSIUM SERPL-SCNC: 4.3 MMOL/L (ref 3.5–5.5)
PROT SERPL-MCNC: 7.5 G/DL (ref 6.4–8.2)
RBC # BLD AUTO: 4.73 M/UL (ref 4.7–5.5)
SODIUM SERPL-SCNC: 142 MMOL/L (ref 136–145)
TRIGL SERPL-MCNC: 268 MG/DL (ref ?–150)
VLDLC SERPL CALC-MCNC: 53.6 MG/DL
WBC # BLD AUTO: 6.5 K/UL (ref 4.6–13.2)

## 2020-07-27 NOTE — PROGRESS NOTES
Please let patient know his HbA1c is 6.2. This is similar to his previous HbA1c and that has remained stable. LDL cholesterol is at 109. We would want this to go down to below 100. His triglycerides elevated at 268. He should exercise and continue taking the WelChol. He is on Crestor. We will continue with the current treatment plan.   Martin Kwon MD

## 2020-08-04 NOTE — PROGRESS NOTES
Informed patient of results at this time. Patient verbalized understanding at this time. No other concerns noted

## 2020-09-19 DIAGNOSIS — E11.65 TYPE 2 DIABETES MELLITUS WITH HYPERGLYCEMIA, WITHOUT LONG-TERM CURRENT USE OF INSULIN (HCC): ICD-10-CM

## 2020-09-21 RX ORDER — METFORMIN HYDROCHLORIDE 500 MG/1
TABLET, EXTENDED RELEASE ORAL
Qty: 180 TAB | Refills: 3 | Status: SHIPPED | OUTPATIENT
Start: 2020-09-21 | End: 2021-11-30 | Stop reason: SDUPTHER

## 2020-10-15 ENCOUNTER — OFFICE VISIT (OUTPATIENT)
Dept: FAMILY MEDICINE CLINIC | Age: 65
End: 2020-10-15
Payer: MEDICARE

## 2020-10-15 VITALS
TEMPERATURE: 98.3 F | RESPIRATION RATE: 18 BRPM | HEART RATE: 83 BPM | OXYGEN SATURATION: 96 % | HEIGHT: 69 IN | SYSTOLIC BLOOD PRESSURE: 126 MMHG | DIASTOLIC BLOOD PRESSURE: 74 MMHG | WEIGHT: 223.6 LBS | BODY MASS INDEX: 33.12 KG/M2

## 2020-10-15 DIAGNOSIS — I10 ESSENTIAL HYPERTENSION: ICD-10-CM

## 2020-10-15 DIAGNOSIS — Z23 ENCOUNTER FOR IMMUNIZATION: ICD-10-CM

## 2020-10-15 DIAGNOSIS — I25.83 CORONARY ARTERY DISEASE DUE TO LIPID RICH PLAQUE: ICD-10-CM

## 2020-10-15 DIAGNOSIS — I25.10 CORONARY ARTERY DISEASE DUE TO LIPID RICH PLAQUE: ICD-10-CM

## 2020-10-15 DIAGNOSIS — G62.9 NEUROPATHY: ICD-10-CM

## 2020-10-15 DIAGNOSIS — E78.5 DYSLIPIDEMIA: ICD-10-CM

## 2020-10-15 DIAGNOSIS — E11.65 TYPE 2 DIABETES MELLITUS WITH HYPERGLYCEMIA, WITHOUT LONG-TERM CURRENT USE OF INSULIN (HCC): Primary | ICD-10-CM

## 2020-10-15 DIAGNOSIS — Z12.11 SCREEN FOR COLON CANCER: ICD-10-CM

## 2020-10-15 PROCEDURE — 90694 VACC AIIV4 NO PRSRV 0.5ML IM: CPT | Performed by: FAMILY MEDICINE

## 2020-10-15 PROCEDURE — 3044F HG A1C LEVEL LT 7.0%: CPT | Performed by: FAMILY MEDICINE

## 2020-10-15 PROCEDURE — 99214 OFFICE O/P EST MOD 30 MIN: CPT | Performed by: FAMILY MEDICINE

## 2020-10-15 PROCEDURE — 36415 COLL VENOUS BLD VENIPUNCTURE: CPT | Performed by: FAMILY MEDICINE

## 2020-10-15 PROCEDURE — 3017F COLORECTAL CA SCREEN DOC REV: CPT | Performed by: FAMILY MEDICINE

## 2020-10-15 PROCEDURE — 2022F DILAT RTA XM EVC RTNOPTHY: CPT | Performed by: FAMILY MEDICINE

## 2020-10-15 PROCEDURE — G8754 DIAS BP LESS 90: HCPCS | Performed by: FAMILY MEDICINE

## 2020-10-15 PROCEDURE — 1101F PT FALLS ASSESS-DOCD LE1/YR: CPT | Performed by: FAMILY MEDICINE

## 2020-10-15 PROCEDURE — G8536 NO DOC ELDER MAL SCRN: HCPCS | Performed by: FAMILY MEDICINE

## 2020-10-15 PROCEDURE — G8427 DOCREV CUR MEDS BY ELIG CLIN: HCPCS | Performed by: FAMILY MEDICINE

## 2020-10-15 PROCEDURE — G8510 SCR DEP NEG, NO PLAN REQD: HCPCS | Performed by: FAMILY MEDICINE

## 2020-10-15 PROCEDURE — G8417 CALC BMI ABV UP PARAM F/U: HCPCS | Performed by: FAMILY MEDICINE

## 2020-10-15 PROCEDURE — G0008 ADMIN INFLUENZA VIRUS VAC: HCPCS | Performed by: FAMILY MEDICINE

## 2020-10-15 PROCEDURE — G8752 SYS BP LESS 140: HCPCS | Performed by: FAMILY MEDICINE

## 2020-10-15 NOTE — PROGRESS NOTES
Chief Complaint   Patient presents with    Follow Up Chronic Condition     1. Have you been to the ER, urgent care clinic since your last visit? Hospitalized since your last visit? No    2. Have you seen or consulted any other health care providers outside of the [address] [place] [address] since your last visit? Include any pap smears or colon screening. No     HPI  Tim Sanchez comes in for follow-up care. Hypertension: Patient has hypertension. He comes in with his blood pressure log and his numbers have been stable. He is on spironolactone and metoprolol. Also takes Imdur. Denies headache, changes in vision or focal weakness. He will continue with the current treatment plan. We will recheck labs today. Diabetes mellitus type 2: Patient has diabetes mellitus type 2 and is on Metformin. Blood glucose numbers have been stable. He comes in with his blood glucose log. We will check his HbA1c today. He will intensify lifestyle and dietary modification. Neuropathy: Patient has neuropathy with numbness and tingling of the chest wall and his hands. He takes gabapentin. He is apprehensive about taking a lot of gabapentin and would like to wean off the medication. I did let him know that it would be okay to try and wean off the medication especially if his symptoms improve. Dyslipidemia: Patient has dyslipidemia. We will recheck his lipid panel. He is on [place] and Crestor. CAD: Patient has a history of coronary artery disease and has had quadruple bypass surgery done. He is on Imdur, Crestor, metoprolol, spironolactone and warfarin. He denies chest pain, diaphoresis or shortness of breath. Continue current medication management. He does have a follow-up appointment to see the cardiologist.  Health maintenance: Patient will have the flu vaccine given today. We discussed colon cancer screening. Referral for colonoscopy was placed.     Past Medical History  Past Medical History:   Diagnosis Date    Bypass graft mechanical complication (HCC)     quadropple    Diabetes ([facility] [address]     Glaucoma     HTN (hypertension)     Hyperlipemia        Surgical History  Past Surgical History:   Procedure Laterality Date    HX COLONOSCOPY  7 years ago    HX KNEE REPLACEMENT  2017    rigjt and left replacement         Medications  Current Outpatient Medications   Medication Sig Dispense Refill    metFORMIN ER (GLUCOPHAGE XR) 500 mg tablet TAKE 1 TABLET BY MOUTH  TWICE DAILY AFTER MEALS 180 Tab 3    colesevelam (WELCHOL) 625 mg tablet Take 2 Tabs by mouth daily. Take 1,250 mg by mouth daily. 60 Tab 3    lancets misc Check blood glucose  daily. Patient would like One Touch Delica Plus Lancets 956 Each 3    glucose blood VI test strips (blood glucose test) strip Check blood glucose  daily. Patient would like One Touch Yashira test strips 100 Strip 3    isosorbide mononitrate ER (IMDUR) 30 mg tablet Take 30 mg by mouth daily.  metoprolol succinate (TOPROL-XL) 100 mg tablet Take 100 mg by mouth nightly.  melatonin 3 mg tablet Take 3 mg by mouth as needed.  polyethylene glycol (MIRALAX) 17 gram/dose powder Take 17 g by mouth as needed.  aspirin delayed-release 81 mg tablet Take 81 mg by mouth daily.  ferrous sulfate 325 mg (65 mg iron) tablet Take 325 mg by mouth every other day. Take 1 tab every other day      furosemide (LASIX) 20 mg tablet Take 20 mg by mouth two (2) times a day. Take 2 tab      magnesium oxide (MAG-OX) 400 mg tablet Take 400 mg by mouth daily.  omeprazole (PRILOSEC OTC) 20 mg tablet Take 20 mg by mouth daily.  rosuvastatin (CRESTOR) 10 mg tablet Take 10 mg by mouth every Monday, Wednesday, Friday.  senna-docusate (SENNA WITH DOCUSATE SODIUM) 8.6-50 mg per tablet Take 1 Tab by mouth daily.  spironolactone (ALDACTONE) 25 mg tablet Take 25 mg by mouth daily.  omega-3 fatty acids/fish oil (THERAGRAN-M PO) Take 1 Tab by mouth daily.       warfarin (COUMADIN) 6 mg tablet Take 6 mg by mouth daily.  co-enzyme Q-10 (CO Q-10) 100 mg capsule Take 100 mg by mouth daily.  brimonidine (ALPHAGAN P) 0.1 % ophthalmic solution Administer 1 Drop to both eyes every eight (8) hours.  gabapentin (NEURONTIN) 100 mg capsule TAKE 1 CAPSULE BY MOUTH 3  TIMES DAILY . MAX DAILY  AMOUNT: 300MG 90 Cap 3       Allergies  Allergies   Allergen Reactions    Statins-Hmg-Coa Reductase Inhibitors Other (comments)       Family History  Family History   Problem Relation Age of Onset    Heart Disease Sister     Heart Attack Brother     Cancer Brother     Anemia Brother        Social History  Social History     Socioeconomic History    Marital status:      Spouse name: Not on file    Number of children: Not on file    Years of education: Not on file    Highest education level: Not on file   Occupational History    Not on file   Social Needs    Financial resource strain: Not on file    Food insecurity     Worry: Not on file     Inability: Not on file    Transportation needs     Medical: Not on file     Non-medical: Not on file   Tobacco Use    Smoking status: Former Smoker    Smokeless tobacco: Never Used   Substance and Sexual Activity    Alcohol use:  Yes     Alcohol/week: 1.0 standard drinks     Types: 1 Shots of liquor per week    Drug use: Never    Sexual activity: Yes     Partners: Female   Lifestyle    Physical activity     Days per week: Not on file     Minutes per session: Not on file    Stress: Not on file   Relationships    Social connections     Talks on phone: Not on file     Gets together: Not on file     Attends Yarsanism service: Not on file     Active member of club or organization: Not on file     Attends meetings of clubs or organizations: Not on file     Relationship status: Not on file    Intimate partner violence     Fear of current or ex partner: Not on file     Emotionally abused: Not on file     Physically abused: Not on file     Forced sexual activity: Not on file   Other Topics Concern    Not on file   Social History Narrative    Not on file       Review of Systems  Review of Systems - Review of all systems is negative except as noted above in the HPI.     Vital Signs  Visit Vitals  /74 (BP 1 Location: Left arm, BP Patient Position: Sitting)   Pulse 83   Temp 98.3 °F (36.8 °C) (Oral)   Resp 18   Ht 5' 9\" (1.753 m)   Wt 223 lb 9.6 oz (101.4 kg)   SpO2 96%   BMI 33.02 kg/m²         Physical Exam  Physical Examination: General appearance - alert, well appearing, and in no distress, oriented to person, place, and time, overweight, acyanotic, in no respiratory distress and well hydrated  Mental status - alert, oriented to person, place, and time, affect appropriate to mood  Neck - supple, no significant adenopathy  Lymphatics - no palpable lymphadenopathy  Chest - clear to auscultation, no wheezes, rales or rhonchi, symmetric air entry, no tachypnea, retractions or cyanosis  Heart - S1 and S2 normal, systolic murmur 2/6 at lower left sternal border  Abdomen - no rebound tenderness noted  Back exam - limited range of motion  Neurological - abnormal neurological exam unchanged from prior examinations  Musculoskeletal - osteoarthritic changes noted in both hands  Extremities - intact peripheral pulses    Results  Results for orders placed or performed during the hospital encounter of 07/16/20   LIPID PANEL   Result Value Ref Range    LIPID PROFILE          Cholesterol, total 203 (H) <200 MG/DL    Triglyceride 268 (H) <150 MG/DL    HDL Cholesterol 40 40 - 60 MG/DL    LDL, calculated 109.4 (H) 0 - 100 MG/DL    VLDL, calculated 53.6 MG/DL    CHOL/HDL Ratio 5.1 (H) 0 - 5.0     CBC WITH AUTOMATED DIFF   Result Value Ref Range    WBC 6.5 4.6 - 13.2 K/uL    RBC 4.73 4.70 - 5.50 M/uL    HGB 15.1 13.0 - 16.0 g/dL    HCT 46.5 36.0 - 48.0 %    MCV 98.3 (H) 74.0 - 97.0 FL    MCH 31.9 24.0 - 34.0 PG    MCHC 32.5 31.0 - 37.0 g/dL    RDW 14.1 11.6 - 14.5 % PLATELET 976 571 - 225 K/uL    MPV 10.7 9.2 - 11.8 FL    NEUTROPHILS 66 40 - 73 %    LYMPHOCYTES 21 21 - 52 %    MONOCYTES 12 (H) 3 - 10 %    EOSINOPHILS 1 0 - 5 %    BASOPHILS 0 0 - 2 %    ABS. NEUTROPHILS 4.3 1.8 - 8.0 K/UL    ABS. LYMPHOCYTES 1.4 0.9 - 3.6 K/UL    ABS. MONOCYTES 0.8 0.05 - 1.2 K/UL    ABS. EOSINOPHILS 0.1 0.0 - 0.4 K/UL    ABS. BASOPHILS 0.0 0.0 - 0.1 K/UL    DF AUTOMATED     METABOLIC PANEL, COMPREHENSIVE   Result Value Ref Range    Sodium 142 136 - 145 mmol/L    Potassium 4.3 3.5 - 5.5 mmol/L    Chloride 108 100 - 111 mmol/L    CO2 26 21 - 32 mmol/L    Anion gap 8 3.0 - 18 mmol/L    Glucose 97 74 - 99 mg/dL    BUN 23 (H) 7.0 - 18 MG/DL    Creatinine 1.12 0.6 - 1.3 MG/DL    BUN/Creatinine ratio 21 (H) 12 - 20      GFR est AA >60 >60 ml/min/1.73m2    GFR est non-AA >60 >60 ml/min/1.73m2    Calcium 9.6 8.5 - 10.1 MG/DL    Bilirubin, total 0.5 0.2 - 1.0 MG/DL    ALT (SGPT) 31 16 - 61 U/L    AST (SGOT) 16 10 - 38 U/L    Alk. phosphatase 66 45 - 117 U/L    Protein, total 7.5 6.4 - 8.2 g/dL    Albumin 4.2 3.4 - 5.0 g/dL    Globulin 3.3 2.0 - 4.0 g/dL    A-G Ratio 1.3 0.8 - 1.7     HEMOGLOBIN A1C WITH EAG   Result Value Ref Range    Hemoglobin A1c 6.2 (H) 4.2 - 5.6 %    Est. average glucose 131 mg/dL   MAGNESIUM   Result Value Ref Range    Magnesium 2.3 1.6 - 2.6 mg/dL       ASSESSMENT and PLAN    ICD-10-CM ICD-9-CM    1. Type 2 diabetes mellitus with hyperglycemia, without long-term current use of insulin (HCC)  E11.65 250.00 HEMOGLOBIN A1C WITH EAG     790.29 CBC WITH AUTOMATED DIFF      COLLECTION VENOUS BLOOD,VENIPUNCTURE   2. Neuropathy  G62.9 355.9    3. Dyslipidemia  E78.5 272.4 LIPID PANEL      COLLECTION VENOUS BLOOD,VENIPUNCTURE   4. Coronary artery disease due to lipid rich plaque  I25.10 414.00     I25.83 414.3    5. Essential hypertension  K21 513.4 METABOLIC PANEL, COMPREHENSIVE      MAGNESIUM      COLLECTION VENOUS BLOOD,VENIPUNCTURE   6.  Encounter for immunization  Z23 V03.89 FLU (FLUAD QUAD INFLUENZA VACCINE,QUAD,ADJUVANTED)      ADMIN INFLUENZA VIRUS VAC   7. Screen for colon cancer  Z12.11 V76.51 REFERRAL TO GASTROENTEROLOGY     lab results and schedule of future lab studies reviewed with patient  reviewed diet, exercise and weight control  cardiovascular risk and specific lipid/LDL goals reviewed  reviewed medications and side effects in detail  specific diabetic recommendations: low cholesterol diet, weight control and daily exercise discussed, home glucose monitoring emphasized, all medications, side effects and compliance discussed carefully, annual eye examinations at Ophthalmology discussed, glycohemoglobin and other lab monitoring discussed and long term diabetic complications discussed  use of aspirin to prevent MI and TIA's discussed      I have discussed the diagnosis with the patient and the intended plan of care as seen in the above orders. The patient has received an after-visit summary and questions were answered concerning future plans. I have discussed medication, side effects, and warnings with the patient in detail. The patient verbalized understanding and is in agreement with the plan of care. The patient will contact the office with any additional concerns. Pernell Bautista MD    PLEASE NOTE:   This document has been produced using voice recognition software.  Unrecognized errors in transcription may be present

## 2020-10-16 ENCOUNTER — HOSPITAL ENCOUNTER (OUTPATIENT)
Dept: LAB | Age: 65
Discharge: HOME | End: 2020-10-16
Payer: MEDICARE

## 2020-10-16 DIAGNOSIS — E11.65 TYPE 2 DIABETES MELLITUS WITH HYPERGLYCEMIA, WITHOUT LONG-TERM CURRENT USE OF INSULIN (HCC): ICD-10-CM

## 2020-10-16 DIAGNOSIS — E78.5 DYSLIPIDEMIA: ICD-10-CM

## 2020-10-16 DIAGNOSIS — I10 ESSENTIAL HYPERTENSION: ICD-10-CM

## 2020-10-16 LAB
ALBUMIN SERPL-MCNC: 4.2 G/DL (ref 3.4–5)
ALBUMIN/GLOB SERPL: 1.4 {RATIO} (ref 0.8–1.7)
ALP SERPL-CCNC: 73 U/L (ref 45–117)
ALT SERPL-CCNC: 31 U/L (ref 16–61)
ANION GAP SERPL CALC-SCNC: 8 MMOL/L (ref 3–18)
AST SERPL-CCNC: 21 U/L (ref 10–38)
BASOPHILS # BLD: 0 K/UL (ref 0–0.1)
BASOPHILS NFR BLD: 0 % (ref 0–2)
BILIRUB SERPL-MCNC: 0.5 MG/DL (ref 0.2–1)
BUN SERPL-MCNC: 20 MG/DL (ref 7–18)
BUN/CREAT SERPL: 21 (ref 12–20)
CALCIUM SERPL-MCNC: 9.4 MG/DL (ref 8.5–10.1)
CHLORIDE SERPL-SCNC: 103 MMOL/L (ref 100–111)
CHOLEST SERPL-MCNC: 195 MG/DL
CO2 SERPL-SCNC: 27 MMOL/L (ref 21–32)
CREAT SERPL-MCNC: 0.97 MG/DL (ref 0.6–1.3)
DIFFERENTIAL METHOD BLD: ABNORMAL
EOSINOPHIL # BLD: 0.1 K/UL (ref 0–0.4)
EOSINOPHIL NFR BLD: 2 % (ref 0–5)
ERYTHROCYTE [DISTWIDTH] IN BLOOD BY AUTOMATED COUNT: 12.7 % (ref 11.6–14.5)
EST. AVERAGE GLUCOSE BLD GHB EST-MCNC: 134 MG/DL
GLOBULIN SER CALC-MCNC: 3.1 G/DL (ref 2–4)
GLUCOSE SERPL-MCNC: 97 MG/DL (ref 74–99)
HBA1C MFR BLD: 6.3 % (ref 4.2–5.6)
HCT VFR BLD AUTO: 42.8 % (ref 36–48)
HDLC SERPL-MCNC: 35 MG/DL (ref 40–60)
HDLC SERPL: 5.6 {RATIO} (ref 0–5)
HGB BLD-MCNC: 14.4 G/DL (ref 13–16)
LDLC SERPL CALC-MCNC: 116 MG/DL (ref 0–100)
LIPID PROFILE,FLP: ABNORMAL
LYMPHOCYTES # BLD: 1.9 K/UL (ref 0.9–3.6)
LYMPHOCYTES NFR BLD: 32 % (ref 21–52)
MAGNESIUM SERPL-MCNC: 2.3 MG/DL (ref 1.6–2.6)
MCH RBC QN AUTO: 33 PG (ref 24–34)
MCHC RBC AUTO-ENTMCNC: 33.6 G/DL (ref 31–37)
MCV RBC AUTO: 97.9 FL (ref 74–97)
MONOCYTES # BLD: 0.8 K/UL (ref 0.05–1.2)
MONOCYTES NFR BLD: 14 % (ref 3–10)
NEUTS SEG # BLD: 3.1 K/UL (ref 1.8–8)
NEUTS SEG NFR BLD: 52 % (ref 40–73)
PLATELET # BLD AUTO: 247 K/UL (ref 135–420)
PMV BLD AUTO: 10 FL (ref 9.2–11.8)
POTASSIUM SERPL-SCNC: 4.2 MMOL/L (ref 3.5–5.5)
PROT SERPL-MCNC: 7.3 G/DL (ref 6.4–8.2)
RBC # BLD AUTO: 4.37 M/UL (ref 4.7–5.5)
SODIUM SERPL-SCNC: 138 MMOL/L (ref 136–145)
TRIGL SERPL-MCNC: 220 MG/DL (ref ?–150)
VLDLC SERPL CALC-MCNC: 44 MG/DL
WBC # BLD AUTO: 5.9 K/UL (ref 4.6–13.2)

## 2020-10-16 PROCEDURE — 83735 ASSAY OF MAGNESIUM: CPT

## 2020-10-16 PROCEDURE — 80061 LIPID PANEL: CPT

## 2020-10-16 PROCEDURE — 83036 HEMOGLOBIN GLYCOSYLATED A1C: CPT

## 2020-10-16 PROCEDURE — 85025 COMPLETE CBC W/AUTO DIFF WBC: CPT

## 2020-10-16 PROCEDURE — 36415 COLL VENOUS BLD VENIPUNCTURE: CPT

## 2020-10-16 PROCEDURE — 80053 COMPREHEN METABOLIC PANEL: CPT

## 2020-10-19 NOTE — PROGRESS NOTES
Please let patient know his LDL cholesterol is 116. This is slightly elevated from the previous 109. He is on [place] and Crestor. He takes Crestor 10 mg daily. He should consider increasing this to 20 mg daily. If he is agreeable I will send in a prescription at the new dose. His HbA1c is 6.3.   Leandro Warner MD

## 2020-10-21 NOTE — PROGRESS NOTES
Spoke with patient at this time regarding results. Patient states he agree in taking medication at this time but he is going to double up on medication then when he get low he will call to send in the new dose medication. No other concerns noted

## 2020-10-26 RX ORDER — COLESEVELAM 180 1/1
1250 TABLET ORAL DAILY
Qty: 180 TAB | Refills: 1 | Status: SHIPPED | OUTPATIENT
Start: 2020-10-26 | End: 2021-04-27

## 2020-12-15 ENCOUNTER — VIRTUAL VISIT (OUTPATIENT)
Dept: FAMILY MEDICINE CLINIC | Age: 65
End: 2020-12-15
Payer: MEDICARE

## 2020-12-15 DIAGNOSIS — Z00.00 MEDICARE ANNUAL WELLNESS VISIT, SUBSEQUENT: ICD-10-CM

## 2020-12-15 DIAGNOSIS — E78.5 DYSLIPIDEMIA: ICD-10-CM

## 2020-12-15 DIAGNOSIS — R05.3 CHRONIC COUGH: ICD-10-CM

## 2020-12-15 DIAGNOSIS — G62.9 NEUROPATHY: ICD-10-CM

## 2020-12-15 DIAGNOSIS — Z13.31 SCREENING FOR DEPRESSION: ICD-10-CM

## 2020-12-15 DIAGNOSIS — I25.83 CORONARY ARTERY DISEASE DUE TO LIPID RICH PLAQUE: ICD-10-CM

## 2020-12-15 DIAGNOSIS — Z71.89 ACP (ADVANCE CARE PLANNING): ICD-10-CM

## 2020-12-15 DIAGNOSIS — E11.65 TYPE 2 DIABETES MELLITUS WITH HYPERGLYCEMIA, WITHOUT LONG-TERM CURRENT USE OF INSULIN (HCC): Primary | ICD-10-CM

## 2020-12-15 DIAGNOSIS — I10 ESSENTIAL HYPERTENSION: ICD-10-CM

## 2020-12-15 DIAGNOSIS — I25.10 CORONARY ARTERY DISEASE DUE TO LIPID RICH PLAQUE: ICD-10-CM

## 2020-12-15 PROCEDURE — G8417 CALC BMI ABV UP PARAM F/U: HCPCS | Performed by: FAMILY MEDICINE

## 2020-12-15 PROCEDURE — G8536 NO DOC ELDER MAL SCRN: HCPCS | Performed by: FAMILY MEDICINE

## 2020-12-15 PROCEDURE — 1101F PT FALLS ASSESS-DOCD LE1/YR: CPT | Performed by: FAMILY MEDICINE

## 2020-12-15 PROCEDURE — 3017F COLORECTAL CA SCREEN DOC REV: CPT | Performed by: FAMILY MEDICINE

## 2020-12-15 PROCEDURE — G0444 DEPRESSION SCREEN ANNUAL: HCPCS | Performed by: FAMILY MEDICINE

## 2020-12-15 PROCEDURE — 99214 OFFICE O/P EST MOD 30 MIN: CPT | Performed by: FAMILY MEDICINE

## 2020-12-15 PROCEDURE — G8427 DOCREV CUR MEDS BY ELIG CLIN: HCPCS | Performed by: FAMILY MEDICINE

## 2020-12-15 PROCEDURE — G8756 NO BP MEASURE DOC: HCPCS | Performed by: FAMILY MEDICINE

## 2020-12-15 PROCEDURE — G8510 SCR DEP NEG, NO PLAN REQD: HCPCS | Performed by: FAMILY MEDICINE

## 2020-12-15 PROCEDURE — 3044F HG A1C LEVEL LT 7.0%: CPT | Performed by: FAMILY MEDICINE

## 2020-12-15 PROCEDURE — 2022F DILAT RTA XM EVC RTNOPTHY: CPT | Performed by: FAMILY MEDICINE

## 2020-12-15 PROCEDURE — G0439 PPPS, SUBSEQ VISIT: HCPCS | Performed by: FAMILY MEDICINE

## 2020-12-15 RX ORDER — ROSUVASTATIN CALCIUM 20 MG/1
20 TABLET, COATED ORAL
Qty: 90 TAB | Refills: 1 | Status: SHIPPED | OUTPATIENT
Start: 2020-12-15 | End: 2021-03-11 | Stop reason: SDUPTHER

## 2020-12-15 RX ORDER — ZOSTER VACCINE RECOMBINANT, ADJUVANTED 50 MCG/0.5
0.5 KIT INTRAMUSCULAR ONCE
Qty: 0.5 ML | Refills: 0 | Status: SHIPPED | OUTPATIENT
Start: 2020-12-15 | End: 2020-12-15

## 2020-12-15 NOTE — PROGRESS NOTES
This is the Subsequent Medicare Annual Wellness Exam, performed 12 months or more after the Initial AWV or the last Subsequent AWV    I have reviewed the patient's medical history in detail and updated the computerized patient record. Depression Risk Factor Screening:     3 most recent PHQ Screens 12/15/2020   PHQ Not Done -   Little interest or pleasure in doing things Not at all   Feeling down, depressed, irritable, or hopeless Not at all   Total Score PHQ 2 0       Alcohol Risk Screen   Do you average more than 1 drink per night or more than 7 drinks a week: No    In the past three months have you have had more than 4 drinks containing alcohol on one occasion: No        Functional Ability and Level of Safety:   Hearing: Hearing is good. Activities of Daily Living: The home contains: no safety equipment. Patient does total self care     Ambulation: with no difficulty     Fall Risk:  Fall Risk Assessment, last 12 mths 12/15/2020   Able to walk? Yes   Fall in past 12 months? No     Abuse Screen:  Patient is not abused       Cognitive Screening   Has your family/caregiver stated any concerns about your memory: no    Cognitive Screening: Normal - Verbal Fluency Test    Assessment/Plan   Education and counseling provided:  Are appropriate based on today's review and evaluation    1. Medicare annual wellness visit, subsequent    2.  Screening for depression  - [address] Maintenance Due     Health Maintenance Due   Topic Date Due    Eye Exam Retinal or Dilated  10/24/1965    DTaP/Tdap/Td series (1 - Tdap) 10/24/1975    Shingrix Vaccine Age 50> (1 of 2) 10/24/2004    Colorectal Cancer Screening Combo  10/24/2004    GLAUCOMA SCREENING Q2Y  10/24/2019    Medicare Yearly Exam  12/03/2020       Patient Care Team   Patient Care Team:  Augustin Rodriguez MD as PCP - General (Family Medicine)  Augustin Rodriguez MD as PCP - [facility] Empaneled Provider  Kimberly Ricci Manuel Malone DO (Cardiology)  Darrell Schroeder MD (Ophthalmology)    History   Linda Bryan is seen for Medicare wellness exam.    Patient Active Problem List   Diagnosis Code    Gastroesophageal reflux disease K21.9    Coronary artery disease with stable angina pectoris ([facility] [address] I25.118     Past Medical History:   Diagnosis Date    Bypass graft mechanical complication ([facility] [address]     quadropple    Diabetes ([facility] [address]     Glaucoma     HTN (hypertension)     Hyperlipemia       Past Surgical History:   Procedure Laterality Date    HX COLONOSCOPY  7 years ago    HX KNEE REPLACEMENT  2017    rigjt and left replacement      Current Outpatient Medications   Medication Sig Dispense Refill    rosuvastatin (CRESTOR) 20 mg tablet Take 1 Tab by mouth nightly. 90 Tab 1    varicella-zoster recombinant, PF, (Shingrix, PF,) 50 mcg/0.5 mL susr injection 0.5 mL by IntraMUSCular route once for 1 dose. 0.5 mL 0    colesevelam (WELCHOL) 625 mg tablet TAKE 2 TABS BY MOUTH DAILY. TAKE 1,250 MG BY MOUTH DAILY. 180 Tab 1    metFORMIN ER (GLUCOPHAGE XR) 500 mg tablet TAKE 1 TABLET BY MOUTH  TWICE DAILY AFTER MEALS 180 Tab 3    gabapentin (NEURONTIN) 100 mg capsule TAKE 1 CAPSULE BY MOUTH 3  TIMES DAILY . MAX DAILY  AMOUNT: 300MG 90 Cap 3    lancets misc Check blood glucose  daily. Patient would like One Touch Delica Plus Lancets 563 Each 3    glucose blood VI test strips (blood glucose test) strip Check blood glucose  daily. Patient would like One Touch Yashira test strips 100 Strip 3    isosorbide mononitrate ER (IMDUR) 30 mg tablet Take 30 mg by mouth daily.  metoprolol succinate (TOPROL-XL) 100 mg tablet Take 100 mg by mouth nightly.  melatonin 3 mg tablet Take 3 mg by mouth as needed.  polyethylene glycol (MIRALAX) 17 gram/dose powder Take 17 g by mouth as needed.  aspirin delayed-release 81 mg tablet Take 81 mg by mouth daily.       ferrous sulfate 325 mg (65 mg iron) tablet Take 325 mg by mouth every other day. Take 1 tab every other day      furosemide (LASIX) 20 mg tablet Take 20 mg by mouth two (2) times a day. Take 2 tab      magnesium oxide (MAG-OX) 400 mg tablet Take 400 mg by mouth daily.  omeprazole (PRILOSEC OTC) 20 mg tablet Take 20 mg by mouth daily.  senna-docusate (SENNA WITH DOCUSATE SODIUM) 8.6-50 mg per tablet Take 1 Tab by mouth daily.  spironolactone (ALDACTONE) 25 mg tablet Take 25 mg by mouth daily.  omega-3 fatty acids/fish oil (THERAGRAN-M PO) Take 1 Tab by mouth daily.  warfarin (COUMADIN) 6 mg tablet Take 6 mg by mouth daily.  co-enzyme Q-10 (CO Q-10) 100 mg capsule Take 100 mg by mouth daily.  brimonidine (ALPHAGAN P) 0.1 % ophthalmic solution Administer 1 Drop to both eyes every eight (8) hours. Allergies   Allergen Reactions    Statins-Hmg-Coa Reductase Inhibitors Other (comments)       Family History   Problem Relation Age of Onset    Heart Disease Sister     Heart Attack Brother     Cancer Brother     Anemia Brother      Social History     Tobacco Use    Smoking status: Former Smoker    Smokeless tobacco: Never Used   Substance Use Topics    Alcohol use: Yes     Alcohol/week: 1.0 standard drinks     Types: 1 Shots of liquor per week       Linda Bryan, who was evaluated through a synchronous (real-time) audio-video encounter, and/or his healthcare decision maker, is aware that it is a billable service, with coverage as determined by his insurance carrier. He provided verbal consent to proceed: Yes, and patient identification was verified. It was conducted pursuant to the emergency declaration under the [address] [place] [address], [address] [place] [address] authority and the [employer] General Act. A caregiver was present when appropriate. Ability to conduct physical exam was limited. I was in the office. The patient was at home.     Briana Tate MD

## 2020-12-15 NOTE — PATIENT INSTRUCTIONS
Medicare Wellness Visit, Male The best way to live healthy is to have a lifestyle where you eat a well-balanced diet, exercise regularly, limit alcohol use, and quit all forms of tobacco/nicotine, if applicable. Regular preventive services are another way to keep healthy. Preventive services (vaccines, screening tests, monitoring & exams) can help personalize your care plan, which helps you manage your own care. Screening tests can find health problems at the earliest stages, when they are easiest to treat. [facility] follows the current, evidence-based guidelines published by the [facility] ([facility]) when recommending preventive services for our patients. Because we follow these guidelines, sometimes recommendations change over time as research supports it. (For example, a prostate screening blood test is no longer routinely recommended for men with no symptoms). Of course, you and your doctor may decide to screen more often for some diseases, based on your risk and co-morbidities (chronic disease you are already diagnosed with). Preventive services for you include: - Medicare offers their members a free annual wellness visit, which is time for you and your primary care provider to discuss and plan for your preventive service needs. Take advantage of this benefit every year! 
-All adults over age 72 should receive the recommended pneumonia vaccines. Current USPSTF guidelines recommend a series of two vaccines for the best pneumonia protection.  
-All adults should have a flu vaccine yearly and tetanus vaccine every 10 years. 
-All adults age 48 and older should receive the shingles vaccines (series of two vaccines).       
-All adults age 38-68 who are overweight should have a diabetes screening test once every three years.  
-Other screening tests & preventive services for persons with diabetes include: an eye exam to screen for diabetic retinopathy, a kidney function test, a foot exam, and stricter control over your cholesterol.  
-Cardiovascular screening for adults with routine risk involves an electrocardiogram (ECG) at intervals determined by the provider.  
-Colorectal cancer screening should be done for adults age 54-65 with no increased risk factors for colorectal cancer. There are a number of acceptable methods of screening for this type of cancer. Each test has its own benefits and drawbacks. Discuss with your provider what is most appropriate for you during your annual wellness visit. The different tests include: colonoscopy (considered the best screening method), a fecal occult blood test, a fecal DNA test, and sigmoidoscopy. 
-All adults born between [facility] should be screened once for Hepatitis C. 
-An Abdominal Aortic Aneurysm (AAA) Screening is recommended for men age 73-68 who has ever smoked in their lifetime. Here is a list of your current Health Maintenance items (your personalized list of preventive services) with a due date: 
Health Maintenance Due Topic Date Due MaynardIsh Eye Exam  10/24/1965  DTaP/Tdap/Td  (1 - Tdap) 10/24/1975  Shingles Vaccine (1 of 2) 10/24/2004  Colorectal Screening  10/24/2004  Glaucoma Screening   10/24/2019 AyalaCharis Annual Well Visit  12/03/2020

## 2020-12-15 NOTE — PROGRESS NOTES
Drea Aguilar is a 76 y.o. male who was seen by synchronous (real-time) audio-video technology on 12/15/2020 for Diabetes; Hypertension; Cholesterol Problem; Annual Wellness Visit; and Cough        Assessment & Plan:       ICD-10-CM ICD-9-CM    1. Type 2 diabetes mellitus with hyperglycemia, without long-term current use of insulin ([facility])  E11.65 250.00      790.29    2. Neuropathy  G62.9 355.9    3. Dyslipidemia  E78.5 272.4 rosuvastatin (CRESTOR) 20 mg tablet   4. Coronary artery disease due to lipid rich plaque  I25.10 414.00     I25.83 414.3    5. Essential hypertension  I10 401.9    6. Chronic cough  R05 786.2 XR CHEST PA LAT   7. Medicare annual wellness visit, subsequent  Z00.00 V70.0    8. Screening for depression  Z13.31 V79.0 DEPRESSION SCREEN ANNUAL      [place] DEPRESS[place]           Subjective:   Drea Aguilar is seen today for follow-up care. Cardiac: Patient has a history of coronary artery disease and has had quadruple bypass. He is followed up with a cardiologist.  Recently had adjustments to his medications. He is taking 20 mg of Crestor daily. Coumadin has been discontinued. He is currently on ranolazine. He is also on Imdur, Toprol-XL, aspirin and spironolactone. We will request the records from his specialist.  He denies chest pain, shortness of breath or diaphoresis. He is also on furosemide currently taking 40 mg daily. We will continue with the current treatment plan. Cough: Patient has a chronic cough that has been ongoing for months. At times it is productive of clear phlegm. It is worse at night. He denies shortness of breath, wheeze, fever or chills. Wife states that in the past he had an x-ray done while they were in [place]. He did have some lung nodules that was thought to be benign and due to previous occupation. We will request the records. I will order a chest x-ray. Neuropathy: Patient has neuropathy both hands. Occasionally gets numbness and tingling. Previously was on gabapentin but no longer using this medication. Symptoms have improved. Hand pain: Patient has a history of primary osteoarthritis of both first carpometacarpal joints. He has been followed up by the hand specialist.  He was given a cortisone injection. Continues to have the symptoms. He wears wrist splints and this does help somewhat. He does not want to have any surgery done. He can take Tylenol arthritis for the pain. If symptoms persist we will refer him back to the hand specialist.  Diabetes mellitus type 2: Patient has type 2 diabetes mellitus. He is on metformin  mg twice a day with meals. Stable on the medication. Has not been checking his blood glucose numbers consistently. His last HbA1c was 6.5. We will continue with the current treatment plan. I will follow-up at next visit. Dyslipidemia: Patient has dyslipidemia. Currently taking Crestor 20 mg daily. He is also on WelChol. I will send in a refill of medication. We will recheck lipid panel at next visit. Hypertension: Patient has hypertension. Blood pressure has been stable. He is on metoprolol and spironolactone. Stable on these medications. We will continue with the current treatment plan. GERD: Patient has gastroesophageal reflux disease. He takes omeprazole. Denies dark stools or hematemesis. Health maintenance: Patient is scheduled to have colonoscopy in January. He has an appointment set up. I will send in a prescription for shingles vaccine to [facility].    Prior to Admission medications    Medication Sig Start Date End Date Taking? Authorizing Provider   colesevelam (WELCHOL) 625 mg tablet TAKE 2 TABS BY MOUTH DAILY. TAKE 1,250 MG BY MOUTH DAILY. 10/26/20   Malena Grider MD   metFORMIN ER (GLUCOPHAGE XR) 500 mg tablet TAKE 1 TABLET BY MOUTH  TWICE DAILY AFTER MEALS 9/21/20   Malena Grider MD   gabapentin (NEURONTIN) 100 mg capsule TAKE 1 CAPSULE BY MOUTH 3  TIMES DAILY .  MAX DAILY  AMOUNT: 300MG 8/3/20   Malena Grider MD   lancets misc Check blood glucose  daily. Patient would like One Touch Delica Plus Lancets 5/91/16   Malena Grider MD   glucose blood VI test strips (blood glucose test) strip Check blood glucose  daily. Patient would like One Touch Yashira test strips 4/14/20   Malena Grider MD   isosorbide mononitrate ER (IMDUR) 30 mg tablet Take 30 mg by mouth daily. 3/13/20   Provider, Historical   metoprolol succinate (TOPROL-XL) 100 mg tablet Take 100 mg by mouth nightly. 12/31/19   Provider, Historical   melatonin 3 mg tablet Take 3 mg by mouth as needed. Provider, Historical   polyethylene glycol (MIRALAX) 17 gram/dose powder Take 17 g by mouth as needed. Provider, Historical   aspirin delayed-release 81 mg tablet Take 81 mg by mouth daily. Provider, Historical   ferrous sulfate 325 mg (65 mg iron) tablet Take 325 mg by mouth every other day. Take 1 tab every other day    Provider, Historical   furosemide (LASIX) 20 mg tablet Take 20 mg by mouth two (2) times a day. Take 2 tab    Provider, Historical   magnesium oxide (MAG-OX) 400 mg tablet Take 400 mg by mouth daily. Provider, Historical   omeprazole (PRILOSEC OTC) 20 mg tablet Take 20 mg by mouth daily. Provider, Historical   rosuvastatin (CRESTOR) 10 mg tablet Take 10 mg by mouth every Monday, Wednesday, Friday. Provider, Historical   senna-docusate (SENNA WITH DOCUSATE SODIUM) 8.6-50 mg per tablet Take 1 Tab by mouth daily. Provider, Historical   spironolactone (ALDACTONE) 25 mg tablet Take 25 mg by mouth daily. Provider, Historical   omega-3 fatty acids/fish oil (THERAGRAN-M PO) Take 1 Tab by mouth daily. Provider, Historical   warfarin (COUMADIN) 6 mg tablet Take 6 mg by mouth daily. Provider, Historical   co-enzyme Q-10 (CO Q-10) 100 mg capsule Take 100 mg by mouth daily.     Provider, Historical   brimonidine (ALPHAGAN P) 0.1 % ophthalmic solution Administer 1 Drop to both eyes every eight (8) hours.    Provider, Historical     ROS Review of all systems is negative except as noted above in the HPI. Objective:   No flowsheet data found. Constitutional: [x] Appears well-developed and well-nourished [x] No apparent distress       Mental status: [x] Alert and awake  [x] Oriented to person/place/time [x] Able to follow commands     HENT: [x] Normocephalic, atraumatic   [x] Mouth/Throat: Mucous membranes are moist    Pulmonary/Chest: [x] Respiratory effort normal   [x] No visualized signs of difficulty breathing or respiratory distress            Neurological:        [x] No Facial Asymmetry (Cranial nerve 7 motor function) (limited exam due to video visit)                   Psychiatric:       [x] Normal Affect    We discussed the expected course, resolution and complications of the diagnosis(es) in detail. Medication risks, benefits, costs, interactions, and alternatives were discussed as indicated. I advised him to contact the office if his condition worsens, changes or fails to improve as anticipated. He expressed understanding with the diagnosis(es) and plan. Jin Teran, who was evaluated through a patient-initiated, synchronous (real-time) audio-video encounter, and/or his healthcare decision maker, is aware that it is a billable service, with coverage as determined by his insurance carrier. He provided verbal consent to proceed: Yes, and patient identification was verified. It was conducted pursuant to the emergency declaration under the [facility], [address] [place] [address] authority and the [employer] Act. A caregiver was present when appropriate. Ability to conduct physical exam was limited. I was in the office. The patient was at home.       Joaquina Shelby MD

## 2020-12-15 NOTE — ACP (ADVANCE CARE PLANNING)
Advance Care Planning     Advance Care Planning (ACP) Physician/NP/PA Conversation      Date of Conversation: 12/15/2020  Conducted with: Patient with Decision Making Capacity    Healthcare Decision Maker:     Click here to complete [place] including [address] Relationship (ie \"Primary\")  Today we documented Decision Maker(s) consistent with ACP documents on file.       Conversation Outcomes / Follow-Up Plan:   ACP complete - no further action today      Length of Voluntary ACP Conversation in minutes:  <16 minutes (Non-Billable)    Malena Paniagua MD

## 2021-02-12 ENCOUNTER — OFFICE VISIT (OUTPATIENT)
Dept: CARDIOLOGY CLINIC | Age: 66
End: 2021-02-12
Payer: MEDICARE

## 2021-02-12 VITALS
SYSTOLIC BLOOD PRESSURE: 111 MMHG | DIASTOLIC BLOOD PRESSURE: 73 MMHG | WEIGHT: 233 LBS | HEART RATE: 90 BPM | TEMPERATURE: 97.5 F | HEIGHT: 69 IN | BODY MASS INDEX: 34.51 KG/M2

## 2021-02-12 DIAGNOSIS — R06.02 SHORTNESS OF BREATH: ICD-10-CM

## 2021-02-12 DIAGNOSIS — I48.0 PAROXYSMAL ATRIAL FIBRILLATION (HCC): ICD-10-CM

## 2021-02-12 DIAGNOSIS — I25.10 CORONARY ARTERY DISEASE INVOLVING NATIVE CORONARY ARTERY OF NATIVE HEART WITHOUT ANGINA PECTORIS: Primary | ICD-10-CM

## 2021-02-12 DIAGNOSIS — E78.5 HYPERLIPIDEMIA, UNSPECIFIED HYPERLIPIDEMIA TYPE: ICD-10-CM

## 2021-02-12 DIAGNOSIS — Z95.1 HX OF CABG: ICD-10-CM

## 2021-02-12 PROCEDURE — G8536 NO DOC ELDER MAL SCRN: HCPCS | Performed by: INTERNAL MEDICINE

## 2021-02-12 PROCEDURE — 93000 ELECTROCARDIOGRAM COMPLETE: CPT | Performed by: INTERNAL MEDICINE

## 2021-02-12 PROCEDURE — 1101F PT FALLS ASSESS-DOCD LE1/YR: CPT | Performed by: INTERNAL MEDICINE

## 2021-02-12 PROCEDURE — G8510 SCR DEP NEG, NO PLAN REQD: HCPCS | Performed by: INTERNAL MEDICINE

## 2021-02-12 PROCEDURE — G8427 DOCREV CUR MEDS BY ELIG CLIN: HCPCS | Performed by: INTERNAL MEDICINE

## 2021-02-12 PROCEDURE — 99204 OFFICE O/P NEW MOD 45 MIN: CPT | Performed by: INTERNAL MEDICINE

## 2021-02-12 PROCEDURE — G8417 CALC BMI ABV UP PARAM F/U: HCPCS | Performed by: INTERNAL MEDICINE

## 2021-02-12 PROCEDURE — 3017F COLORECTAL CA SCREEN DOC REV: CPT | Performed by: INTERNAL MEDICINE

## 2021-02-12 RX ORDER — LATANOPROST 50 UG/ML
1 SOLUTION/ DROPS OPHTHALMIC
COMMUNITY

## 2021-02-12 RX ORDER — ACETAMINOPHEN 500 MG
500 TABLET ORAL
COMMUNITY

## 2021-02-12 RX ORDER — CHLORHEXIDINE GLUCONATE 1.2 MG/ML
15 RINSE ORAL EVERY 12 HOURS
COMMUNITY
End: 2021-03-12

## 2021-02-12 RX ORDER — RANOLAZINE 500 MG/1
500 TABLET, EXTENDED RELEASE ORAL 2 TIMES DAILY
COMMUNITY
End: 2021-03-12 | Stop reason: ALTCHOICE

## 2021-02-12 RX ORDER — LISINOPRIL 5 MG/1
5 TABLET ORAL DAILY
COMMUNITY

## 2021-02-12 NOTE — PROGRESS NOTES
HISTORY OF PRESENT ILLNESS  Elen Ireland is a 76 y.o. male. 2/12/2021  Patient seen today for new patient evaluation he has history of CAD, paroxysmal A. fib prior CABG and hyperlipidemia. Patient is recovering of increasing shortness of breath. Exertional shortness of breath is persistent since CABG    Patient has increased shortness of breath on exertion happens with climbing stairs his activity. Occasional chest pain which are short lasting left-sided not related activity or exertion. Denies orthopnea PND no peripheral edema. Patient had prior PCI's and subsequent CABG in December 2019. He had a complex course requiring reopening of the chest.  Postoperatively he had atrial fibrillation with no recurrence after that. His anticoagulation has been discontinued since then. Review of Systems   Constitutional: Negative for chills and fever. HENT: Negative for nosebleeds. Eyes: Negative for blurred vision and double vision. Respiratory: Positive for shortness of breath. Negative for cough, hemoptysis, sputum production and wheezing. Cardiovascular: Negative for chest pain, palpitations, orthopnea, claudication, leg swelling and PND. Gastrointestinal: Negative for abdominal pain, heartburn, nausea and vomiting. Musculoskeletal: Negative for myalgias. Skin: Negative for rash. Neurological: Negative for dizziness, weakness and headaches. Endo/Heme/Allergies: Does not bruise/bleed easily.      Family History   Problem Relation Age of Onset    Heart Disease Sister     Heart Attack Brother     Cancer Brother     Anemia Brother        Past Medical History:   Diagnosis Date    Bypass graft mechanical complication ([address]     quadropple    Diabetes ([address]     Glaucoma     HTN (hypertension)     Hyperlipemia        Past Surgical History:   Procedure Laterality Date    HX COLONOSCOPY  7 years ago    HX CORONARY ARTERY BYPASS GRAFT      HX KNEE REPLACEMENT  2017    rigjt and left replacement        Social History     Tobacco Use    Smoking status: Former Smoker    Smokeless tobacco: Never Used   Substance Use Topics    Alcohol use: Yes     Alcohol/week: 1.0 standard drinks     Types: 1 Shots of liquor per week     Comment: social       Allergies   Allergen Reactions    Statins-Hmg-Coa Reductase Inhibitors Other (comments)       Prior to Admission medications    Medication Sig Start Date End Date Taking? Authorizing Provider   lisinopriL (PRINIVIL, ZESTRIL) 5 mg tablet Take 5 mg by mouth daily. Yes Provider, Historical   ranolazine ER (RANEXA) 500 mg SR tablet Take 500 mg by mouth two (2) times a day. Yes Provider, Historical   latanoprost (XALATAN) 0.005 % ophthalmic solution Administer 1 Drop to both eyes nightly. Yes Provider, Historical   multivitamin, tx-iron-ca-min (THERA-M w/ IRON) 9 mg iron-400 mcg tab tablet Take 1 Tab by mouth daily. Yes Provider, Historical   acetaminophen (Tylenol Extra Strength) 500 mg tablet Take 500 mg by mouth every six (6) hours as needed for Pain. Yes Provider, Historical   chlorhexidine (PERIDEX) 0.12 % solution 15 mL by Swish and Spit route every twelve (12) hours. Yes Provider, Historical   rosuvastatin (CRESTOR) 20 mg tablet Take 1 Tab by mouth nightly. 12/15/20  Yes Raoul Paulnio MD   colesevelam (WELCHOL) 625 mg tablet TAKE 2 TABS BY MOUTH DAILY. TAKE 1,250 MG BY MOUTH DAILY. 10/26/20  Yes Raoul Paulino MD   metFORMIN ER (GLUCOPHAGE XR) 500 mg tablet TAKE 1 TABLET BY MOUTH  TWICE DAILY AFTER MEALS 9/21/20  Yes Malena Rock MD   lancets misc Check blood glucose  daily. Patient would like One Touch Delica Plus Lancets 0/89/13  Yes Raoul Paulino MD   glucose blood VI test strips (blood glucose test) strip Check blood glucose  daily. Patient would like One Touch Yashira test strips 4/14/20  Yes Malena Grider MD   isosorbide mononitrate ER (IMDUR) 30 mg tablet Take 30 mg by mouth daily.  3/13/20  Yes Provider, Historical metoprolol succinate (TOPROL-XL) 100 mg tablet Take 100 mg by mouth nightly. 12/31/19  Yes Provider, Historical   aspirin delayed-release 81 mg tablet Take 81 mg by mouth daily. Yes Provider, Historical   furosemide (LASIX) 20 mg tablet Take 40 mg by mouth daily. Take 2 tab   Yes Provider, Historical   co-enzyme Q-10 (CO Q-10) 100 mg capsule Take 100 mg by mouth daily. Yes Provider, Historical   magnesium oxide (MAG-OX) 400 mg tablet Take 400 mg by mouth daily. Provider, Historical         Visit Vitals  /73   Pulse 90   Temp 97.5 °F (36.4 °C)   Ht 5' 9\" (1.753 m)   Wt 105.7 kg (233 lb)   BMI 34.41 kg/m²         Physical Exam   Constitutional: He is oriented to person, place, and time. He appears well-developed and well-nourished. HENT:   Head: Normocephalic and atraumatic. Eyes: Conjunctivae are normal.   Neck: Neck supple. No JVD present. No tracheal deviation present. No thyromegaly present. Cardiovascular: Normal rate, regular rhythm and normal heart sounds. Exam reveals no gallop and no friction rub. No murmur heard. Pulmonary/Chest: Breath sounds normal. No respiratory distress. He has no wheezes. He has no rales. He exhibits no tenderness. Abdominal: Soft. There is no abdominal tenderness. Musculoskeletal:         General: No edema. Neurological: He is alert and oriented to person, place, and time. Skin: Skin is warm and dry. Psychiatric: He has a normal mood and affect. Mr. Viji Shepherd has a reminder for a \"due or due soon\" health maintenance. I have asked that he contact his primary care provider for follow-up on this health maintenance. No flowsheet data found. I have personally reviewed patient's records available from hospital and other providers and incorporated findings in patient care.   Notes, lab, ED EKG, chest x-ray, stress test  CORONARY ANGIOGRAPHY:   LM:  Large caliber vessel with no significant disease.    LAD: Large caliber vessel with three diagonal branches and small   septal  vessels with a 60% tubular stenosis within a   previously placed stent in the proximal LAD. The mid LAD has a   50% tubular stenosis. The remainder of the LAD has mild luminal   irregularities.  There are left to right collaterals arising from   the septal perforators. --1st Diagonal: Small caliber vessel with no significant disease. --2nd Diagonal: Small caliber vessel with mild diffuse disease. LEFT CX:  Large caliber, co-dominant vessel with 80% ostial   stenosis.   --OM1: Moderate caliber vessel with a 50% tubular eccentric   stenosis in the mid-segment. Marnell [place] --AV groove Cx: Small caliber vessel with mild diffuse disease. RAMUS: Large caliber vessel with 50% proximal stenosis followed   by a 90% focal stenosis in the proximal to mid segment. RCA: The right coronary artery is a moderate to large caliber,   co-dominant vessel with a 80% focal stenosis in the proximal   segment. The mid-segment has an aneurysmal segment followed by a   30% focal stenosis. --PDA: Moderate caliber vessel with no significant disease. --CAROLYNE: Moderate caliber vessel with 30% tubular stenosis in the   mid-segment.       IMPRESSION:  1. Multivessel Coronary Artery Disease. 2. Normal left ventricular end diastolic pressure. 12/2019  OPERATION PERFORMED:  Coronary artery bypass grafting x4 with 1 artery and 3 veins, left internal mammary artery to left anterior descending, reversed greater saphenous vein to the right coronary, and reversed left greater saphenous vein as a sequential to the circ 1 (ramus) and circ 2, left endoscopic vein harvest, and SPY angiography.                    ECHO LIMITED STUDY (W/O COLOR)3/13/2020  [employer]  Component Name Value Ref Range   EF Echo 53     Result Impression   :   HYPOKINESIS OF THE BASAL-MID INFERIOR, BASAL-MID INFEROLATERAL AND BASAL INFEROSEPTAL WALLS   WITH PRESERVED GLOBAL LEFT VENTRICULAR SYSTOLIC FUNCTION WITH A CALCULATED BI-PLANE OF 53%. MILDLY DILATED RIGHT VENTRICULAR SIZE WITH REDUCED FUNCTION. INDETERMINATE DIASTOLIC FUNCTION. MILD LEFT VENTRICULAR HYPERTROPHY PRESENT.   BOWING OF THE INTERATRIAL SEPTUM TOWARDS THE LEFT. NO EVIDENCE OF TRICUSPID REGURGITATION. ALEXANDRU Rest/Stress Multiple Spect3/13/2020  [employer]  Component Name Value Ref Range   EF Nuc 65     Result Impression    THIS MYOCARDIAL PERFUSION STUDY IS NORMAL   THIS IS A LOW RISK STUDY   MYOCARDIAL PERFUSION IMAGING IS NORMAL. NO ABNORMALITIES OR EVIDENCE OF ISCHEMIA OR PRIOR INFARCTION. THE CALCULATED LV EJECTION FRACTION WAS 65%. OVERALL LEFT VENTRICULAR SYSTOLIC FUNCTION WAS NORMAL WITHOUT REGIONAL WALL MOTION   ABNORMALITIES. IMPRESSION nuclear stress test7/2020  IMPRESSION:  1. No evidence of reversible myocardial ischemia. 2. This is a low risk study. Assessment         ICD-10-CM ICD-9-CM    1. Coronary artery disease involving native coronary artery of native heart without angina pectoris  I25.10 414.01 AMB POC EKG ROUTINE W/ 12 LEADS, INTER & REP      ECHO ADULT COMPLETE    To nuclear stress test last year has been negative. Her shortness of breath on exertion which is concerning which could be angina equivalent   2. Hx of CABG  Z95.1 V45.81 AMB POC EKG ROUTINE W/ 12 LEADS, INTER & REP   3. Hyperlipidemia, unspecified hyperlipidemia type  E78.5 272.4 AMB POC EKG ROUTINE W/ 12 LEADS, INTER & REP      LIPID PANEL      HEPATIC FUNCTION PANEL    Continue current dose of statin mildly elevated LDL consider additional medication   4. Paroxysmal atrial fibrillation (HCC)  I48.0 427.31 AMB POC EKG ROUTINE W/ 12 LEADS, INTER & REP    Postop A. fib. No recurrence was noted. Anticoagulation was discontinued in past   5. Shortness of breath  R06.02 786.05 ECHO ADULT COMPLETE   2/2021  Seen for evaluation of CAD and multiple other problems.   Extensive chart review including prior bypass surgeries cardiac catheterization and lab reviewed. Patient had wall motion abnormality on prior echo so we will follow-up to make sure LV dysfunction has not occurred. If we see that there is significant LV dysfunction consider cardiac catheterization with possible angina equivalent. Patient is on multiple medical management. Recent increase in rosuvastatin was done for elevated LDL. Follow-up lab. If echocardiogram shows no significant change or LV dysfunction consider pulmonary function test once labs open up. This would to assess for any restriction post operatively  Nose concern of CHF/diuretic as needed  Medications Discontinued During This Encounter   Medication Reason    warfarin (COUMADIN) 6 mg tablet Therapy Completed    ferrous sulfate 325 mg (65 mg iron) tablet Therapy Completed    brimonidine (ALPHAGAN P) 0.1 % ophthalmic solution Therapy Completed    gabapentin (NEURONTIN) 100 mg capsule Therapy Completed    polyethylene glycol (MIRALAX) 17 gram/dose powder Therapy Completed    senna-docusate (SENNA WITH DOCUSATE SODIUM) 8.6-50 mg per tablet Therapy Completed    melatonin 3 mg tablet Therapy Completed    spironolactone (ALDACTONE) 25 mg tablet Therapy Completed    omeprazole (PRILOSEC OTC) 20 mg tablet Therapy Completed    omega-3 fatty acids/fish oil (THERAGRAN-M PO) Therapy Completed       Orders Placed This Encounter    LIPID PANEL     Standing Status:   Future     Standing Expiration Date:   8/13/2021    HEPATIC FUNCTION PANEL     Standing Status:   Future     Standing Expiration Date:   8/13/2021    AMB POC EKG ROUTINE W/ 12 LEADS, INTER & REP     Order Specific Question:   Reason for Exam:     Answer:   sob    ECHO ADULT COMPLETE     Standing Status:   Future     Standing Expiration Date:   2/12/2022     Order Specific Question:   Contrast Enhancement (Bubble Study, Definity, Optison) may be used if criteria listed in established evidence-based protocol has been identified.      Answer:   Yes       Follow-up and Dispositions    · Return for F/u after tests.

## 2021-02-16 ENCOUNTER — CLINICAL SUPPORT (OUTPATIENT)
Dept: SURGERY | Age: 66
End: 2021-02-16

## 2021-02-16 VITALS
HEIGHT: 69 IN | BODY MASS INDEX: 34.66 KG/M2 | HEART RATE: 86 BPM | OXYGEN SATURATION: 95 % | RESPIRATION RATE: 17 BRPM | WEIGHT: 234 LBS | TEMPERATURE: 97.8 F

## 2021-02-16 DIAGNOSIS — Z12.11 COLON CANCER SCREENING: ICD-10-CM

## 2021-02-16 DIAGNOSIS — Z01.818 PRE-OP TESTING: Primary | ICD-10-CM

## 2021-02-16 NOTE — PROGRESS NOTES
Review of Systems   Constitutional: Negative. HENT: Negative. Eyes: Negative. Respiratory: Positive for shortness of breath. Negative for cough, hemoptysis, sputum production and wheezing. Cardiovascular: Positive for chest pain. Negative for palpitations, orthopnea, claudication, leg swelling and PND. Gastrointestinal: Negative. Genitourinary: Negative. Musculoskeletal: Positive for back pain, joint pain and neck pain. Negative for falls and myalgias. Skin: Negative. Neurological: Positive for dizziness and headaches. Negative for tingling, tremors, sensory change, speech change, focal weakness, seizures, loss of consciousness and weakness. Endo/Heme/Allergies: Negative for environmental allergies and polydipsia. Bruises/bleeds easily. Psychiatric/Behavioral: Negative. Colon Screen    Patient: Shonda Soriano MRN: [id number]  SSN: [id number]    YOB: 1955  Age: 76 y.o. Sex: male        Subjective:   Shonda Soriano was referred by his PCP, Francisco Armsrtong MD.  Patient referred for colonoscopy for   Screening colonoscopy. Patient denies rectal pain or bleeding. Abdominal surgeries as described below, specifically bilateral hernia repair. Family history as described below, specifically none. Last colonoscopy was 10 years ago, patient states this was normal. Patient will be scheduled at [facility] due to SOB and cardiac issues.     Allergies   Allergen Reactions    Atorvastatin Other (comments)     Muscle weakness    Heparin Other (comments)     thrombocytopenia    Heparin Analogues Other (comments)     thrombocytopenia    Medrol [Methylprednisolone] Other (comments)     Muscle weakness    Statins-Hmg-Coa Reductase Inhibitors Other (comments)    Zetia [Ezetimibe] Other (comments)     Muscle joint pain    Zocor [Simvastatin] Other (comments)     Muscle joint pain       Past Medical History:   Diagnosis Date    Bypass graft mechanical complication ([facility] [address] quadropple    Diabetes ([facility] [address]     Glaucoma     History of staph infection     knee    HTN (hypertension)     Hyperlipemia     SOB (shortness of breath)      Past Surgical History:   Procedure Laterality Date    HX COLONOSCOPY  2010    normal    HX CORONARY ARTERY BYPASS GRAFT      HX CYST REMOVAL      HX HERNIA REPAIR Bilateral     HX KNEE REPLACEMENT  2017    rigjt and left replacement       Family History   Problem Relation Age of Onset    Heart Disease Sister     Heart Attack Brother     Cancer Brother     Anemia Brother      Social History     Tobacco Use    Smoking status: Former Smoker    Smokeless tobacco: Never Used   Substance Use Topics    Alcohol use: Yes     Alcohol/week: 1.0 standard drinks     Types: 1 Shots of liquor per week     Comment: social      Prior to Admission medications    Medication Sig Start Date End Date Taking? Authorizing Provider   lisinopriL (PRINIVIL, ZESTRIL) 5 mg tablet Take 5 mg by mouth daily. Yes Provider, Historical   ranolazine ER (RANEXA) 500 mg SR tablet Take 500 mg by mouth two (2) times a day. Yes Provider, Historical   latanoprost (XALATAN) 0.005 % ophthalmic solution Administer 1 Drop to both eyes nightly. Yes Provider, Historical   multivitamin, tx-iron-ca-min (THERA-M w/ IRON) 9 mg iron-400 mcg tab tablet Take 1 Tab by mouth daily. Yes Provider, Historical   acetaminophen (Tylenol Extra Strength) 500 mg tablet Take 500 mg by mouth every six (6) hours as needed for Pain. Yes Provider, Historical   rosuvastatin (CRESTOR) 20 mg tablet Take 1 Tab by mouth nightly. 12/15/20  Yes Tia Aguayo MD   colesevelam (WELCHOL) 625 mg tablet TAKE 2 TABS BY MOUTH DAILY. TAKE 1,250 MG BY MOUTH DAILY. 10/26/20  Yes Tia Aguayo MD   metFORMIN ER (GLUCOPHAGE XR) 500 mg tablet TAKE 1 TABLET BY MOUTH  TWICE DAILY AFTER MEALS 9/21/20  Yes Malena Whitney MD   lancets misc Check blood glucose  daily.   Patient would like One Touch Delica Plus Lancets 7/16/20  Yes Chino Phillip MD   glucose blood VI test strips (blood glucose test) strip Check blood glucose  daily. Patient would like One Touch Yashira test strips 4/14/20  Yes Malena Grider MD   isosorbide mononitrate ER (IMDUR) 30 mg tablet Take 30 mg by mouth daily. 3/13/20  Yes Provider, Historical   metoprolol succinate (TOPROL-XL) 100 mg tablet Take 100 mg by mouth nightly. 12/31/19  Yes Provider, Historical   aspirin delayed-release 81 mg tablet Take 81 mg by mouth daily. Yes Provider, Historical   co-enzyme Q-10 (CO Q-10) 100 mg capsule Take 100 mg by mouth daily. Yes Provider, Historical   chlorhexidine (PERIDEX) 0.12 % solution 15 mL by Swish and Spit route every twelve (12) hours. Provider, Historical   furosemide (LASIX) 20 mg tablet Take 40 mg by mouth daily. Take 2 tab    Provider, Historical   magnesium oxide (MAG-OX) 400 mg tablet Take 400 mg by mouth daily. Provider, Historical          Risks colonoscopy described- colon injury, missed lesion, anesthesia problems, bleeding       Christa Hernandez, LPN  February 16, 4261  11:35 AM

## 2021-03-03 LAB
A-G RATIO,AGRAT: 1.6 RATIO (ref 1.1–2.6)
ALBUMIN SERPL-MCNC: 4.4 G/DL (ref 3.5–5)
ALP SERPL-CCNC: 65 U/L (ref 40–125)
ALT SERPL-CCNC: 27 U/L (ref 5–40)
AST SERPL W P-5'-P-CCNC: 22 U/L (ref 10–37)
BILIRUB SERPL-MCNC: 0.3 MG/DL (ref 0.2–1.2)
BILIRUBIN, DIRECT,CBIL: <0.2 MG/DL (ref 0–0.3)
CHOLEST SERPL-MCNC: 138 MG/DL (ref 110–200)
GLOBULIN,GLOB: 2.7 G/DL (ref 2–4)
HDLC SERPL-MCNC: 34 MG/DL
HDLC SERPL-MCNC: 4.1 MG/DL (ref 0–5)
LDL/HDL RATIO,LDHD: 2.2
LDLC SERPL CALC-MCNC: 75 MG/DL (ref 50–99)
NON-HDL CHOLESTEROL, 011976: 104 MG/DL
PROT SERPL-MCNC: 7.1 G/DL (ref 6.2–8.1)
TRIGL SERPL-MCNC: 146 MG/DL (ref 40–149)
VLDLC SERPL CALC-MCNC: 29 MG/DL (ref 8–30)

## 2021-03-04 NOTE — PROGRESS NOTES
Called patient, verified name and date of birth. Informed patient that lab results were in and advised of previous result note from Dr. Vinnie Greenfield . Patient verbalized understanding and had no further questions.

## 2021-03-09 RX ORDER — ISOSORBIDE MONONITRATE 30 MG/1
30 TABLET, EXTENDED RELEASE ORAL DAILY
Qty: 90 TAB | Refills: 3 | Status: SHIPPED | OUTPATIENT
Start: 2021-03-09 | End: 2022-01-13

## 2021-03-11 ENCOUNTER — VIRTUAL VISIT (OUTPATIENT)
Dept: FAMILY MEDICINE CLINIC | Age: 66
End: 2021-03-11
Payer: MEDICARE

## 2021-03-11 DIAGNOSIS — I25.708 CORONARY ARTERY DISEASE OF BYPASS GRAFT OF NATIVE HEART WITH STABLE ANGINA PECTORIS (HCC): Primary | ICD-10-CM

## 2021-03-11 DIAGNOSIS — G62.9 NEUROPATHY: ICD-10-CM

## 2021-03-11 DIAGNOSIS — M79.641 PAIN IN BOTH HANDS: ICD-10-CM

## 2021-03-11 DIAGNOSIS — M25.50 ARTHRALGIA, UNSPECIFIED JOINT: ICD-10-CM

## 2021-03-11 DIAGNOSIS — E78.5 DYSLIPIDEMIA: ICD-10-CM

## 2021-03-11 DIAGNOSIS — E11.65 TYPE 2 DIABETES MELLITUS WITH HYPERGLYCEMIA, WITHOUT LONG-TERM CURRENT USE OF INSULIN (HCC): ICD-10-CM

## 2021-03-11 DIAGNOSIS — M79.642 PAIN IN BOTH HANDS: ICD-10-CM

## 2021-03-11 DIAGNOSIS — I10 ESSENTIAL HYPERTENSION: ICD-10-CM

## 2021-03-11 PROCEDURE — 3046F HEMOGLOBIN A1C LEVEL >9.0%: CPT | Performed by: FAMILY MEDICINE

## 2021-03-11 PROCEDURE — 3017F COLORECTAL CA SCREEN DOC REV: CPT | Performed by: FAMILY MEDICINE

## 2021-03-11 PROCEDURE — 99214 OFFICE O/P EST MOD 30 MIN: CPT | Performed by: FAMILY MEDICINE

## 2021-03-11 PROCEDURE — G8510 SCR DEP NEG, NO PLAN REQD: HCPCS | Performed by: FAMILY MEDICINE

## 2021-03-11 PROCEDURE — G8756 NO BP MEASURE DOC: HCPCS | Performed by: FAMILY MEDICINE

## 2021-03-11 PROCEDURE — 1101F PT FALLS ASSESS-DOCD LE1/YR: CPT | Performed by: FAMILY MEDICINE

## 2021-03-11 PROCEDURE — 2022F DILAT RTA XM EVC RTNOPTHY: CPT | Performed by: FAMILY MEDICINE

## 2021-03-11 PROCEDURE — G8427 DOCREV CUR MEDS BY ELIG CLIN: HCPCS | Performed by: FAMILY MEDICINE

## 2021-03-11 RX ORDER — ROSUVASTATIN CALCIUM 20 MG/1
20 TABLET, COATED ORAL
Qty: 90 TAB | Refills: 1 | Status: SHIPPED | OUTPATIENT
Start: 2021-03-11 | End: 2021-08-12

## 2021-03-11 NOTE — PROGRESS NOTES
Chief Complaint   Patient presents with    Follow Up Chronic Condition    Medication Evaluation     1. Have you been to the ER, urgent care clinic since your last visit? Hospitalized since your last visit? No    2. Have you seen or consulted any other health care providers outside of the [address] [place] [address] since your last visit? Include any pap smears or colon screening. No     Patient blood glucose 123 today     Marge Ortiz is a 76 y.o. male who was seen by synchronous (real-time) audio-video technology on 3/11/2021 for Follow Up Chronic Condition and Medication Evaluation        Assessment & Plan:       ICD-10-CM ICD-9-CM    1. Coronary artery disease of bypass graft of native heart with stable angina pectoris (HCC)  I25.708 414.05      413.9    2. Dyslipidemia  E78.5 272.4 rosuvastatin (CRESTOR) 20 mg tablet   3. Type 2 diabetes mellitus with hyperglycemia, without long-term current use of insulin (HCC)  E11.65 250.00      790.29    4. Neuropathy  G62.9 355.9    5. Essential hypertension  I10 401.9    6. Pain in both hands  M79.641 729.5     M79.642     7. Arthralgia, unspecified joint  M25.50 719.40      Subjective:   Marge Ortiz is seen today for follow-up care. Cardiac: Patient has a history of CAD and has had quadruple bypass surgery done. He is being followed up by the cardiologist and has a follow-up visit tomorrow. He is on Crestor, Imdur, Zestril, Ranexa, aspirin and metoprolol. He denies chest pain, shortness of breath or diaphoresis. He was having some shortness of breath previously and his Lasix was discontinued. He states that he feels better after the Lasix was discontinued. He is trying to be more active. He will follow-up with his specialist tomorrow. Hypertension: Patient has hypertension. Blood pressure is stable. He denies headache, changes in vision or focal weakness. He is on metoprolol and lisinopril and Imdur. Blood pressure remained stable.   We will continue with the current treatment plan. Dyslipidemia: Patient has dyslipidemia and is on Crestor. Needs a refill of the 20 mg of Crestor. Prescription will be sent into the pharmacy. Diabetes mellitus type 2: Patient has type 2 diabetes mellitus. He is on Metformin. Blood glucose numbers have been stable. Last HbA1c was 6.3. We will recheck labs at next visit. Arthralgia: Patient has bilateral knee pain and hand pain. Denies swelling or redness of the joints. This is due to arthralgia. We discussed management options. He is on Tylenol. He can take at 1000 mg up to 3 times a day as needed for the pain. Neuropathy: Patient has numbness and tingling of the hands. This is due to neuropathy. In the past he has been on gabapentin but discontinued the medication. He does not want to be on a lot of medications. For now he will do supportive care. Health maintenance: Patient will get COVID-19 vaccine on Sunday this week. He is scheduled for colonoscopy. Prior to Admission medications    Medication Sig Start Date End Date Taking? Authorizing Provider   rosuvastatin (CRESTOR) 20 mg tablet Take 1 Tab by mouth nightly. 3/11/21  Yes Malena Grider MD   isosorbide mononitrate ER (IMDUR) 30 mg tablet Take 1 Tab by mouth daily. 3/9/21  Yes Kiko Purdy NP   lisinopriL (PRINIVIL, ZESTRIL) 5 mg tablet Take 5 mg by mouth daily. Yes Provider, Historical   ranolazine ER (RANEXA) 500 mg SR tablet Take 500 mg by mouth two (2) times a day. Yes Provider, Historical   latanoprost (XALATAN) 0.005 % ophthalmic solution Administer 1 Drop to both eyes nightly. Yes Provider, Historical   multivitamin, tx-iron-ca-min (THERA-M w/ IRON) 9 mg iron-400 mcg tab tablet Take 1 Tab by mouth daily. Yes Provider, Historical   acetaminophen (Tylenol Extra Strength) 500 mg tablet Take 500 mg by mouth every six (6) hours as needed for Pain.    Yes Provider, Historical   chlorhexidine (PERIDEX) 0.12 % solution 15 mL by Swish and Spit route every twelve (12) hours. Yes Provider, Historical   colesevelam (WELCHOL) 625 mg tablet TAKE 2 TABS BY MOUTH DAILY. TAKE 1,250 MG BY MOUTH DAILY. 10/26/20  Yes Sharlene Ching MD   metFORMIN ER (GLUCOPHAGE XR) 500 mg tablet TAKE 1 TABLET BY MOUTH  TWICE DAILY AFTER MEALS 9/21/20  Yes Malena Guerrero MD   lancets misc Check blood glucose  daily. Patient would like One Touch Delica Plus Lancets 2/77/25  Yes Sharlene Ching MD   glucose blood VI test strips (blood glucose test) strip Check blood glucose  daily. Patient would like One Touch Yashira test strips 4/14/20  Yes Malena Grider MD   metoprolol succinate (TOPROL-XL) 100 mg tablet Take 100 mg by mouth nightly. 12/31/19  Yes Provider, Historical   aspirin delayed-release 81 mg tablet Take 81 mg by mouth daily. Yes Provider, Historical   magnesium oxide (MAG-OX) 400 mg tablet Take 400 mg by mouth daily. Yes Provider, Historical   co-enzyme Q-10 (CO Q-10) 100 mg capsule Take 100 mg by mouth daily. Yes Provider, Historical   rosuvastatin (CRESTOR) 20 mg tablet Take 1 Tab by mouth nightly. 12/15/20 3/11/21  Malena Grider MD   furosemide (LASIX) 20 mg tablet Take 40 mg by mouth daily. Take 2 tab not taking    Provider, Historical     ROS Review of all systems is negative except as noted above in the HPI.     Objective:     Patient-Reported Vitals 3/11/2021   Patient-Reported Weight 226lbs   Patient-Reported Pulse 93   Patient-Reported Systolic  180   Patient-Reported Diastolic 79      Constitutional: [x] Appears well-developed and well-nourished [x] No apparent distress       Mental status: [x] Alert and awake  [x] Oriented to person/place/time [x] Able to follow commands      HENT: [x] Normocephalic, atraumatic     Pulmonary/Chest: [x] Respiratory effort normal   [x] No visualized signs of difficulty breathing or respiratory distress           Neurological:        [x] No Facial Asymmetry (Cranial nerve 7 motor function) (limited exam due to video visit)                    Psychiatric:       [x] Normal Affect    We discussed the expected course, resolution and complications of the diagnosis(es) in detail. Medication risks, benefits, costs, interactions, and alternatives were discussed as indicated. I advised him to contact the office if his condition worsens, changes or fails to improve as anticipated. He expressed understanding with the diagnosis(es) and plan. Onofre Ty, was evaluated through a synchronous (real-time) audio-video encounter. The patient (or guardian if applicable) is aware that this is a billable service. Verbal consent to proceed has been obtained within the past 12 months. The visit was conducted pursuant to the emergency declaration under the [facility], [address] [place] [address] authority and the [employer] Act. Patient identification was verified, and a caregiver was present when appropriate. The patient was located in a state where the provider was credentialed to provide care.       Olvin Matute MD

## 2021-03-12 ENCOUNTER — OFFICE VISIT (OUTPATIENT)
Dept: CARDIOLOGY CLINIC | Age: 66
End: 2021-03-12
Payer: MEDICARE

## 2021-03-12 VITALS
DIASTOLIC BLOOD PRESSURE: 66 MMHG | SYSTOLIC BLOOD PRESSURE: 105 MMHG | BODY MASS INDEX: 34.51 KG/M2 | WEIGHT: 233 LBS | HEART RATE: 90 BPM | HEIGHT: 69 IN | TEMPERATURE: 98.2 F | OXYGEN SATURATION: 96 %

## 2021-03-12 DIAGNOSIS — E78.5 HYPERLIPIDEMIA, UNSPECIFIED HYPERLIPIDEMIA TYPE: ICD-10-CM

## 2021-03-12 DIAGNOSIS — Z95.1 HX OF CABG: ICD-10-CM

## 2021-03-12 DIAGNOSIS — I48.0 PAROXYSMAL ATRIAL FIBRILLATION (HCC): ICD-10-CM

## 2021-03-12 DIAGNOSIS — I25.10 CORONARY ARTERY DISEASE INVOLVING NATIVE CORONARY ARTERY OF NATIVE HEART WITHOUT ANGINA PECTORIS: Primary | ICD-10-CM

## 2021-03-12 DIAGNOSIS — R06.02 SHORTNESS OF BREATH: ICD-10-CM

## 2021-03-12 PROCEDURE — 1101F PT FALLS ASSESS-DOCD LE1/YR: CPT | Performed by: INTERNAL MEDICINE

## 2021-03-12 PROCEDURE — G8427 DOCREV CUR MEDS BY ELIG CLIN: HCPCS | Performed by: INTERNAL MEDICINE

## 2021-03-12 PROCEDURE — G8417 CALC BMI ABV UP PARAM F/U: HCPCS | Performed by: INTERNAL MEDICINE

## 2021-03-12 PROCEDURE — G8432 DEP SCR NOT DOC, RNG: HCPCS | Performed by: INTERNAL MEDICINE

## 2021-03-12 PROCEDURE — G8752 SYS BP LESS 140: HCPCS | Performed by: INTERNAL MEDICINE

## 2021-03-12 PROCEDURE — G8754 DIAS BP LESS 90: HCPCS | Performed by: INTERNAL MEDICINE

## 2021-03-12 PROCEDURE — 99214 OFFICE O/P EST MOD 30 MIN: CPT | Performed by: INTERNAL MEDICINE

## 2021-03-12 PROCEDURE — 3017F COLORECTAL CA SCREEN DOC REV: CPT | Performed by: INTERNAL MEDICINE

## 2021-03-12 PROCEDURE — G8536 NO DOC ELDER MAL SCRN: HCPCS | Performed by: INTERNAL MEDICINE

## 2021-03-12 NOTE — PROGRESS NOTES
HISTORY OF PRESENT ILLNESS  Sherri Bateman is a 76 y.o. male. 2/12/2021  Patient seen today for new patient evaluation he has history of CAD, paroxysmal A. fib prior CABG and hyperlipidemia. Patient is recovering of increasing shortness of breath. Exertional shortness of breath is persistent since CABG    Patient has increased shortness of breath on exertion happens with climbing stairs his activity. Occasional chest pain which are short lasting left-sided not related activity or exertion. Denies orthopnea PND no peripheral edema. Patient had prior PCI's and subsequent CABG in December 2019. He had a complex course requiring reopening of the chest.  Postoperatively he had atrial fibrillation with no recurrence after that. His anticoagulation has been discontinued since then. Review of Systems   Constitutional: Negative for chills and fever. HENT: Negative for nosebleeds. Eyes: Negative for blurred vision and double vision. Respiratory: Positive for shortness of breath. Negative for cough, hemoptysis, sputum production and wheezing. Cardiovascular: Negative for chest pain, palpitations, orthopnea, claudication, leg swelling and PND. Gastrointestinal: Negative for abdominal pain, heartburn, nausea and vomiting. Musculoskeletal: Negative for myalgias. Skin: Negative for rash. Neurological: Negative for dizziness, weakness and headaches. Endo/Heme/Allergies: Does not bruise/bleed easily.      Family History   Problem Relation Age of Onset    Heart Disease Sister     Heart Attack Brother     Cancer Brother     Anemia Brother        Past Medical History:   Diagnosis Date    Bypass graft mechanical complication ([address]     quadropple    Diabetes ([facility] [address]     Glaucoma     History of staph infection     knee    HTN (hypertension)     Hyperlipemia     SOB (shortness of breath)        Past Surgical History:   Procedure Laterality Date    HX COLONOSCOPY  2010    normal    HX CORONARY ARTERY BYPASS GRAFT      HX CYST REMOVAL      HX HERNIA REPAIR Bilateral     HX KNEE REPLACEMENT  2017    rigjt and left replacement        Social History     Tobacco Use    Smoking status: Former Smoker    Smokeless tobacco: Never Used   Substance Use Topics    Alcohol use: Yes     Alcohol/week: 1.0 standard drinks     Types: 1 Shots of liquor per week     Comment: social       Allergies   Allergen Reactions    Atorvastatin Other (comments)     Muscle weakness    Heparin Other (comments)     thrombocytopenia    Heparin Analogues Other (comments)     thrombocytopenia    Medrol [Methylprednisolone] Other (comments)     Muscle weakness    Statins-Hmg-Coa Reductase Inhibitors Other (comments)    Zetia [Ezetimibe] Other (comments)     Muscle joint pain    Zocor [Simvastatin] Other (comments)     Muscle joint pain       Prior to Admission medications    Medication Sig Start Date End Date Taking? Authorizing Provider   rosuvastatin (CRESTOR) 20 mg tablet Take 1 Tab by mouth nightly. 3/11/21  Yes Malena Grider MD   isosorbide mononitrate ER (IMDUR) 30 mg tablet Take 1 Tab by mouth daily. 3/9/21  Yes Kiko Purdy NP   lisinopriL (PRINIVIL, ZESTRIL) 5 mg tablet Take 5 mg by mouth daily. Yes Provider, Historical   latanoprost (XALATAN) 0.005 % ophthalmic solution Administer 1 Drop to both eyes nightly. Yes Provider, Historical   multivitamin, tx-iron-ca-min (THERA-M w/ IRON) 9 mg iron-400 mcg tab tablet Take 1 Tab by mouth daily. Yes Provider, Historical   acetaminophen (Tylenol Extra Strength) 500 mg tablet Take 500 mg by mouth every six (6) hours as needed for Pain. Yes Provider, Historical   colesevelam (WELCHOL) 625 mg tablet TAKE 2 TABS BY MOUTH DAILY. TAKE 1,250 MG BY MOUTH DAILY. 10/26/20  Yes Tiara Peraza MD   metFORMIN ER (GLUCOPHAGE XR) 500 mg tablet TAKE 1 TABLET BY MOUTH  TWICE DAILY AFTER MEALS 9/21/20  Yes Malena Sanders MD   lancets misc Check blood glucose  daily.   Patient would like One Touch Delica Plus Lancets 2/88/67  Yes Tia Aguayo MD   glucose blood VI test strips (blood glucose test) strip Check blood glucose  daily. Patient would like One Touch Yashira test strips 4/14/20  Yes Malena Grider MD   metoprolol succinate (TOPROL-XL) 100 mg tablet Take 100 mg by mouth nightly. 12/31/19  Yes Provider, Historical   aspirin delayed-release 81 mg tablet Take 81 mg by mouth daily. Yes Provider, Historical   co-enzyme Q-10 (CO Q-10) 100 mg capsule Take 100 mg by mouth daily. Yes Provider, Historical         Visit Vitals  /66 (BP 1 Location: Right arm, BP Patient Position: Sitting, BP Cuff Size: Large adult)   Pulse 90   Temp 98.2 °F (36.8 °C) (Temporal)   Ht 5' 9\" (1.753 m)   Wt 105.7 kg (233 lb)   SpO2 96%   BMI 34.41 kg/m²         Physical Exam   Constitutional: He is oriented to person, place, and time. He appears well-developed and well-nourished. HENT:   Head: Normocephalic and atraumatic. Eyes: Conjunctivae are normal.   Neck: Neck supple. No JVD present. No tracheal deviation present. No thyromegaly present. Cardiovascular: Normal rate, regular rhythm and normal heart sounds. Exam reveals no gallop and no friction rub. No murmur heard. Pulmonary/Chest: Breath sounds normal. No respiratory distress. He has no wheezes. He has no rales. He exhibits no tenderness. Abdominal: Soft. There is no abdominal tenderness. Musculoskeletal:         General: No edema. Neurological: He is alert and oriented to person, place, and time. Skin: Skin is warm and dry. Psychiatric: He has a normal mood and affect. Mr. Kathy Villeda has a reminder for a \"due or due soon\" health maintenance. I have asked that he contact his primary care provider for follow-up on this health maintenance. No flowsheet data found. I have personally reviewed patient's records available from hospital and other providers and incorporated findings in patient care.   Notes, lab, ED EKG, chest x-ray, stress test  CORONARY ANGIOGRAPHY:   LM:  Large caliber vessel with no significant disease.    LAD: Large caliber vessel with three diagonal branches and small   septal  vessels with a 60% tubular stenosis within a   previously placed stent in the proximal LAD. The mid LAD has a   50% tubular stenosis. The remainder of the LAD has mild luminal   irregularities.  There are left to right collaterals arising from   the septal perforators. --1st Diagonal: Small caliber vessel with no significant disease. --2nd Diagonal: Small caliber vessel with mild diffuse disease. LEFT CX:  Large caliber, co-dominant vessel with 80% ostial   stenosis.   --OM1: Moderate caliber vessel with a 50% tubular eccentric   stenosis in the mid-segment. Lorean Snare --AV groove Cx: Small caliber vessel with mild diffuse disease. RAMUS: Large caliber vessel with 50% proximal stenosis followed   by a 90% focal stenosis in the proximal to mid segment. RCA: The right coronary artery is a moderate to large caliber,   co-dominant vessel with a 80% focal stenosis in the proximal   segment. The mid-segment has an aneurysmal segment followed by a   30% focal stenosis. --PDA: Moderate caliber vessel with no significant disease. --CAROLYNE: Moderate caliber vessel with 30% tubular stenosis in the   mid-segment.       IMPRESSION:  1. Multivessel Coronary Artery Disease. 2. Normal left ventricular end diastolic pressure. 12/2019  OPERATION PERFORMED:  Coronary artery bypass grafting x4 with 1 artery and 3 veins, left internal mammary artery to left anterior descending, reversed greater saphenous vein to the right coronary, and reversed left greater saphenous vein as a sequential to the circ 1 (ramus) and circ 2, left endoscopic vein harvest, and SPY angiography.            ECHO LIMITED STUDY (W/O COLOR)3/13/2020  [facility]  Component Name Value Ref Range   EF Echo 53     Result Impression   :   HYPOKINESIS OF THE BASAL-MID INFERIOR, BASAL-MID INFEROLATERAL AND BASAL INFEROSEPTAL WALLS   WITH PRESERVED GLOBAL LEFT VENTRICULAR SYSTOLIC FUNCTION WITH A CALCULATED BI-PLANE OF 53%. MILDLY DILATED RIGHT VENTRICULAR SIZE WITH REDUCED FUNCTION. INDETERMINATE DIASTOLIC FUNCTION. MILD LEFT VENTRICULAR HYPERTROPHY PRESENT.   BOWING OF THE INTERATRIAL SEPTUM TOWARDS THE LEFT. NO EVIDENCE OF TRICUSPID REGURGITATION. ALEXANDRU Rest/Stress Multiple Spect3/13/2020  [facility]  Component Name Value Ref Range   EF Nuc 65     Result Impression    THIS MYOCARDIAL PERFUSION STUDY IS NORMAL   THIS IS A LOW RISK STUDY   MYOCARDIAL PERFUSION IMAGING IS NORMAL. NO ABNORMALITIES OR EVIDENCE OF ISCHEMIA OR PRIOR INFARCTION. THE CALCULATED LV EJECTION FRACTION WAS 65%. OVERALL LEFT VENTRICULAR SYSTOLIC FUNCTION WAS NORMAL WITHOUT REGIONAL WALL MOTION   ABNORMALITIES. IMPRESSION nuclear stress test7/2020  IMPRESSION:  1. No evidence of reversible myocardial ischemia. 2. This is a low risk study. Interpretation Summary 2/2021    · LV: Estimated LVEF is 55 - 60%. Normal cavity size and systolic function (ejection fraction normal). Mild concentric hypertrophy. Mild hypokinesis of the basal inferoseptal, basal inferior and basal anteroseptal wall(s). Mild (grade 1) left ventricular diastolic dysfunction. · RV: Mildly dilated right ventricle. Mildly reduced systolic function. · RA: Mildly dilated right atrium. · PA: Pulmonary arterial systolic pressure is 20 mmHg. · AO: Mild aortic root dilatation (3.9 cm). Assessment         ICD-10-CM ICD-9-CM    1. Coronary artery disease involving native coronary artery of native heart without angina pectoris  I25.10 414.01     Stable continue treatment monitor   2. Hx of CABG  Z95.1 V45.81     Stable   3. Hyperlipidemia, unspecified hyperlipidemia type  E78.5 272.4     Continue treatment lab with PCP   4. Paroxysmal atrial fibrillation (HCC)  I48.0 427.31     Stable monitor   5.  Shortness of breath  R06.02 786.05     Significant improvement of symptoms. It has improved after stopping Lasix. Currently doing much better we will continue to monitor   2/2021  Seen for evaluation of CAD and multiple other problems. Extensive chart review including prior bypass surgeries cardiac catheterization and lab reviewed. Patient had wall motion abnormality on prior echo so we will follow-up to make sure LV dysfunction has not occurred. If we see that there is significant LV dysfunction consider cardiac catheterization with possible angina equivalent. Patient is on multiple medical management. Recent increase in rosuvastatin was done for elevated LDL. Follow-up lab. If echocardiogram shows no significant change or LV dysfunction consider pulmonary function test once labs open up. This would to assess for any restriction post operatively  no concern of CHF/diuretic as needed  3/2021  Echocardiogram exam with normal ejection fraction no pulmonary hypertension. Symptoms are significantly improved after stopping Lasix. As he does not have any angina and negative nuclear stress test we will also hold Ranexa. Any other treatment. We will hold off on PFT at present      Medications Discontinued During This Encounter   Medication Reason    chlorhexidine (PERIDEX) 0.12 % solution Not A Current Medication    furosemide (LASIX) 20 mg tablet Not A Current Medication    magnesium oxide (MAG-OX) 400 mg tablet Not A Current Medication    ranolazine ER (RANEXA) 500 mg SR tablet Therapy Completed       No orders of the defined types were placed in this encounter. Follow-up and Dispositions    · Return in about 6 months (around 9/12/2021).

## 2021-03-12 NOTE — PROGRESS NOTES
1. Have you been to the ER, urgent care clinic since your last visit? Hospitalized since your last visit?     no    2. Have you seen or consulted any other health care providers outside of the [address] [place] [address] since your last visit? Include any pap smears or colon screening. No     3. Do you need any refills today?    No

## 2021-03-18 NOTE — PERIOP NOTES
PAT phone assessment completed on Eusebio Hawk. The following instructions were reviewed with Mr Katerine Hameed and  verbalized understanding. 1. Do NOT eat or drink anything, including candy, gum, or ice chips after midnight on 3/25/2021, unless you have specific instructions from your surgeon or anesthesia provider to do so. 2. Brush your teeth before coming to the hospital.  3. No smoking 24 hours prior to the day of surgery. 4. No alcohol 24 hours prior to the day of surgery. 5. No recreational drugs for one week prior to the day of surgery. 6. Leave all valuables, including money/purse, at home. 7. Remove all jewelry, nail polish, acrylic nails, and makeup (including mascara); no lotions powders, deodorant, or perfume/cologne/after shave on the skin. 8. Glasses/contact lenses and dentures may be worn to the hospital.  They will be removed prior to surgery. 9. Call your doctor if symptoms of a cold or illness develop within 24-48 hours prior to your surgery. 10.  AN ADULT MUST DRIVE YOU HOME AFTER OUTPATIENT SURGERY. 11.  If you are having an outpatient procedure, please make arrangements for a responsible adult to be with you for 24 hours after your surgery. 12.  NO VISITORS in the hospital at this time for outpatient procedures. Exceptions may be made for surgical admissions, per hospital guidelines        Special Instructions:      Bring list of CURRENT medications. Bring any pertinent legal medical records. Take these medications the morning of surgery with a sip of water: BP medicines. Follow physician instructions about insulin. Follow physician instructions about stopping anticoagulants. Complete bowel prep per MD instructions.

## 2021-03-22 ENCOUNTER — HOSPITAL ENCOUNTER (OUTPATIENT)
Dept: LAB | Age: 66
Discharge: HOME | End: 2021-03-22
Payer: MEDICARE

## 2021-03-22 PROCEDURE — U0003 INFECTIOUS AGENT DETECTION BY NUCLEIC ACID (DNA OR RNA); SEVERE ACUTE RESPIRATORY SYNDROME CORONAVIRUS 2 (SARS-COV-2) (CORONAVIRUS DISEASE [COVID-19]), AMPLIFIED PROBE TECHNIQUE, MAKING USE OF HIGH THROUGHPUT TECHNOLOGIES AS DESCRIBED BY CMS-2020-01-R: HCPCS

## 2021-03-24 LAB — SARS-COV-2, COV2NT: NOT DETECTED

## 2021-03-25 ENCOUNTER — ANESTHESIA EVENT (OUTPATIENT)
Dept: ENDOSCOPY | Age: 66
End: 2021-03-25
Payer: MEDICARE

## 2021-03-26 ENCOUNTER — ANESTHESIA (OUTPATIENT)
Dept: ENDOSCOPY | Age: 66
End: 2021-03-26
Payer: MEDICARE

## 2021-03-26 ENCOUNTER — HOSPITAL ENCOUNTER (OUTPATIENT)
Age: 66
Setting detail: OUTPATIENT SURGERY
Discharge: HOME | End: 2021-03-26
Attending: COLON & RECTAL SURGERY | Admitting: COLON & RECTAL SURGERY
Payer: MEDICARE

## 2021-03-26 VITALS
TEMPERATURE: 98.4 F | SYSTOLIC BLOOD PRESSURE: 98 MMHG | WEIGHT: 226 LBS | BODY MASS INDEX: 33.47 KG/M2 | HEIGHT: 69 IN | OXYGEN SATURATION: 96 % | DIASTOLIC BLOOD PRESSURE: 66 MMHG | HEART RATE: 81 BPM | RESPIRATION RATE: 21 BRPM

## 2021-03-26 LAB
GLUCOSE BLD STRIP.AUTO-MCNC: 108 MG/DL (ref 70–110)
GLUCOSE BLD STRIP.AUTO-MCNC: 125 MG/DL (ref 70–110)

## 2021-03-26 PROCEDURE — 82962 GLUCOSE BLOOD TEST: CPT

## 2021-03-26 PROCEDURE — 77030008565 HC TBNG SUC IRR ERBE -B: Performed by: COLON & RECTAL SURGERY

## 2021-03-26 PROCEDURE — 74011250637 HC RX REV CODE- 250/637: Performed by: NURSE ANESTHETIST, CERTIFIED REGISTERED

## 2021-03-26 PROCEDURE — 74011250636 HC RX REV CODE- 250/636: Performed by: NURSE ANESTHETIST, CERTIFIED REGISTERED

## 2021-03-26 PROCEDURE — 77030013992 HC SNR POLYP ENDOSC BSC -B: Performed by: COLON & RECTAL SURGERY

## 2021-03-26 PROCEDURE — C1729 CATH, DRAINAGE: HCPCS | Performed by: COLON & RECTAL SURGERY

## 2021-03-26 PROCEDURE — 00812 ANES LWR INTST SCR COLSC: CPT | Performed by: ANESTHESIOLOGY

## 2021-03-26 PROCEDURE — 2709999900 HC NON-CHARGEABLE SUPPLY: Performed by: COLON & RECTAL SURGERY

## 2021-03-26 PROCEDURE — 76040000007: Performed by: COLON & RECTAL SURGERY

## 2021-03-26 PROCEDURE — G0121 COLON CA SCRN NOT HI RSK IND: HCPCS | Performed by: COLON & RECTAL SURGERY

## 2021-03-26 PROCEDURE — 76060000032 HC ANESTHESIA 0.5 TO 1 HR: Performed by: COLON & RECTAL SURGERY

## 2021-03-26 PROCEDURE — 74011000258 HC RX REV CODE- 258: Performed by: NURSE ANESTHETIST, CERTIFIED REGISTERED

## 2021-03-26 PROCEDURE — 00812 ANES LWR INTST SCR COLSC: CPT | Performed by: NURSE ANESTHETIST, CERTIFIED REGISTERED

## 2021-03-26 RX ORDER — PROPOFOL 10 MG/ML
INJECTION, EMULSION INTRAVENOUS AS NEEDED
Status: DISCONTINUED | OUTPATIENT
Start: 2021-03-26 | End: 2021-03-26 | Stop reason: HOSPADM

## 2021-03-26 RX ORDER — SODIUM CHLORIDE 0.9 % (FLUSH) 0.9 %
5-40 SYRINGE (ML) INJECTION EVERY 8 HOURS
Status: DISCONTINUED | OUTPATIENT
Start: 2021-03-26 | End: 2021-03-26 | Stop reason: HOSPADM

## 2021-03-26 RX ORDER — INSULIN LISPRO 100 [IU]/ML
INJECTION, SOLUTION INTRAVENOUS; SUBCUTANEOUS ONCE
Status: DISCONTINUED | OUTPATIENT
Start: 2021-03-26 | End: 2021-03-26 | Stop reason: HOSPADM

## 2021-03-26 RX ORDER — DEXTROSE 50 % IN WATER (D50W) INTRAVENOUS SYRINGE
25-50 AS NEEDED
Status: DISCONTINUED | OUTPATIENT
Start: 2021-03-26 | End: 2021-03-26 | Stop reason: HOSPADM

## 2021-03-26 RX ORDER — MAGNESIUM SULFATE 100 %
4 CRYSTALS MISCELLANEOUS AS NEEDED
Status: DISCONTINUED | OUTPATIENT
Start: 2021-03-26 | End: 2021-03-26 | Stop reason: HOSPADM

## 2021-03-26 RX ORDER — FAMOTIDINE 20 MG/1
20 TABLET, FILM COATED ORAL ONCE
Status: COMPLETED | OUTPATIENT
Start: 2021-03-26 | End: 2021-03-26

## 2021-03-26 RX ORDER — SODIUM CHLORIDE, SODIUM LACTATE, POTASSIUM CHLORIDE, CALCIUM CHLORIDE 600; 310; 30; 20 MG/100ML; MG/100ML; MG/100ML; MG/100ML
50 INJECTION, SOLUTION INTRAVENOUS CONTINUOUS
Status: DISCONTINUED | OUTPATIENT
Start: 2021-03-26 | End: 2021-03-26 | Stop reason: HOSPADM

## 2021-03-26 RX ORDER — SODIUM CHLORIDE 0.9 % (FLUSH) 0.9 %
5-40 SYRINGE (ML) INJECTION AS NEEDED
Status: DISCONTINUED | OUTPATIENT
Start: 2021-03-26 | End: 2021-03-26 | Stop reason: HOSPADM

## 2021-03-26 RX ORDER — LIDOCAINE HYDROCHLORIDE 10 MG/ML
0.1 INJECTION, SOLUTION EPIDURAL; INFILTRATION; INTRACAUDAL; PERINEURAL AS NEEDED
Status: DISCONTINUED | OUTPATIENT
Start: 2021-03-26 | End: 2021-03-26 | Stop reason: HOSPADM

## 2021-03-26 RX ADMIN — SODIUM CHLORIDE 100 MCG: 9 INJECTION, SOLUTION INTRAVENOUS at 15:17

## 2021-03-26 RX ADMIN — PROPOFOL 20 MG: 10 INJECTION, EMULSION INTRAVENOUS at 15:19

## 2021-03-26 RX ADMIN — PROPOFOL 20 MG: 10 INJECTION, EMULSION INTRAVENOUS at 15:22

## 2021-03-26 RX ADMIN — PROPOFOL 20 MG: 10 INJECTION, EMULSION INTRAVENOUS at 15:08

## 2021-03-26 RX ADMIN — SODIUM CHLORIDE, SODIUM LACTATE, POTASSIUM CHLORIDE, AND CALCIUM CHLORIDE 50 ML/HR: 600; 310; 30; 20 INJECTION, SOLUTION INTRAVENOUS at 13:56

## 2021-03-26 RX ADMIN — PROPOFOL 20 MG: 10 INJECTION, EMULSION INTRAVENOUS at 15:16

## 2021-03-26 RX ADMIN — SODIUM CHLORIDE 200 MCG: 9 INJECTION, SOLUTION INTRAVENOUS at 15:30

## 2021-03-26 RX ADMIN — FAMOTIDINE 20 MG: 20 TABLET ORAL at 14:00

## 2021-03-26 RX ADMIN — PROPOFOL 20 MG: 10 INJECTION, EMULSION INTRAVENOUS at 15:12

## 2021-03-26 RX ADMIN — SODIUM CHLORIDE 100 MCG: 9 INJECTION, SOLUTION INTRAVENOUS at 15:22

## 2021-03-26 RX ADMIN — PROPOFOL 20 MG: 10 INJECTION, EMULSION INTRAVENOUS at 15:14

## 2021-03-26 RX ADMIN — PROPOFOL 80 MG: 10 INJECTION, EMULSION INTRAVENOUS at 15:06

## 2021-03-26 RX ADMIN — PROPOFOL 20 MG: 10 INJECTION, EMULSION INTRAVENOUS at 15:10

## 2021-03-26 NOTE — ANESTHESIA PREPROCEDURE EVALUATION
Relevant Problems   CARDIOVASCULAR   (+) Coronary artery disease with stable angina pectoris (HCC)      GASTROINTESTINAL   (+) Gastroesophageal reflux disease       Anesthetic History   No history of anesthetic complications            Review of Systems / Medical History  Patient summary reviewed and pertinent labs reviewed    Pulmonary  Within defined limits                 Neuro/Psych   Within defined limits           Cardiovascular    Hypertension: well controlled          CAD and CABG (12/19. Denies anjina/SOB)    Exercise tolerance: >4 METS     GI/Hepatic/Renal  Within defined limits              Endo/Other    Diabetes: well controlled, type 2         Other Findings              Physical Exam    Airway  Mallampati: II  TM Distance: 4 - 6 cm  Neck ROM: normal range of motion   Mouth opening: Normal     Cardiovascular  Regular rate and rhythm,  S1 and S2 normal,  no murmur, click, rub, or gallop             Dental  No notable dental hx       Pulmonary  Breath sounds clear to auscultation               Abdominal  GI exam deferred       Other Findings            Anesthetic Plan    ASA: 3  Anesthesia type: MAC          Induction: Intravenous  Anesthetic plan and risks discussed with: Patient

## 2021-03-26 NOTE — DISCHARGE INSTRUCTIONS
Patient Education        Colonoscopy: What to Expect at [address] [place] [address]  After a colonoscopy, you'll stay at the clinic for 1 to 2 hours until the medicines wear off. Then you can go home. But you'll need to arrange for a ride. Your doctor will tell you when you can eat and do your other usual activities. Your doctor will talk to you about when you'll need your next colonoscopy. Your doctor can help you decide how often you need to be checked. This will depend on the results of your test and your risk for colorectal cancer. After the test, you may be bloated or have gas pains. You may need to pass gas. If a biopsy was done or a polyp was removed, you may have streaks of blood in your stool (feces) for a few days. Problems such as heavy rectal bleeding may not occur until several weeks after the test. This isn't common. But it can happen after polyps are removed. This care sheet gives you a general idea about how long it will take for you to recover. But each person recovers at a different pace. Follow the steps below to get better as quickly as possible. How can you care for yourself at home? Activity    · Rest when you feel tired.     · You can do your normal activities when it feels okay to do so. Diet    · Follow your doctor's directions for eating.     · Unless your doctor has told you not to, drink plenty of fluids. This helps to replace the fluids that were lost during the colon prep.     · Do not drink alcohol. Medicines    · Your doctor will tell you if and when you can restart your medicines. He or she will also give you instructions about taking any new medicines.     · If you take aspirin or some other blood thinner, ask your doctor if and when to start taking it again.  Make sure that you understand exactly what your doctor wants you to do.     · If polyps were removed or a biopsy was done during the test, your doctor may tell you not to take aspirin or other anti-inflammatory medicines for a few days. These include ibuprofen (Advil, Motrin) and naproxen (Aleve). Other instructions    · For your safety, do not drive or operate machinery until the medicine wears off and you can think clearly. Your doctor may tell you not to drive or operate machinery until the day after your test.     · Do not sign legal documents or make major decisions until the medicine wears off and you can think clearly. The anesthesia can make it hard for you to fully understand what you are agreeing to. Follow-up care is a key part of your treatment and safety. Be sure to make and go to all appointments, and call your doctor if you are having problems. It's also a good idea to know your test results and keep a list of the medicines you take. When should you call for help? Call 911 anytime you think you may need emergency care. For example, call if:    · You passed out (lost consciousness).     · You pass maroon or bloody stools.     · You have trouble breathing. Call your doctor now or seek immediate medical care if:    · You have pain that does not get better after you take pain medicine.     · You are sick to your stomach or cannot drink fluids.     · You have new or worse belly pain.     · You have blood in your stools.     · You have a fever.     · You cannot pass stools or gas. Watch closely for changes in your health, and be sure to contact your doctor if you have any problems. Where can you learn more? Go to http://www.gray.com/  Enter E264 in the search box to learn more about \"Colonoscopy: What to Expect at Home. \"  Current as of: April 29, 2020               Content Version: 12.6  © 2669-4915 [employer]. Care instructions adapted under license by [employer] (which disclaims liability or warranty for this information).  If you have questions about a medical condition or this instruction, always ask your healthcare professional. Manan Chiang disclaims any warranty or liability for your use of this information. Patient armband removed and shredded         DISCHARGE SUMMARY from Nurse    PATIENT INSTRUCTIONS:    After general anesthesia or intravenous sedation, for 24 hours or while taking prescription Narcotics:  · Limit your activities  · Do not drive and operate hazardous machinery  · Do not make important personal or business decisions  · Do  not drink alcoholic beverages  · If you have not urinated within 8 hours after discharge, please contact your surgeon on call. Report the following to your surgeon:  · Excessive pain, swelling, redness or odor of or around the surgical area  · Temperature over 100.5  · Nausea and vomiting lasting longer than 4 hours or if unable to take medications  · Any signs of decreased circulation or nerve impairment to extremity: change in color, persistent  numbness, tingling, coldness or increase pain  · Any questions    *  Please give a list of your current medications to your Primary Care Provider. *  Please update this list whenever your medications are discontinued, doses are      changed, or new medications (including over-the-counter products) are added. *  Please carry medication information at all times in case of emergency situations. These are general instructions for a healthy lifestyle:    No smoking/ No tobacco products/ Avoid exposure to second hand smoke  Surgeon General's Warning:  Quitting smoking now greatly reduces serious risk to your health.     Obesity, smoking, and sedentary lifestyle greatly increases your risk for illness    A healthy diet, regular physical exercise & weight monitoring are important for maintaining a healthy lifestyle    You may be retaining fluid if you have a history of heart failure or if you experience any of the following symptoms:  Weight gain of 3 pounds or more overnight or 5 pounds in a week, increased swelling in our hands or feet or shortness of breath while lying flat in bed. Please call your doctor as soon as you notice any of these symptoms; do not wait until your next office visit. The discharge information has been reviewed with the patient and spouse. The patient and spouse verbalized understanding. Discharge medications reviewed with the patient and spouse and appropriate educational materials and side effects teaching were provided.   ___________________________________________________________________________________________________________________________________

## 2021-03-26 NOTE — ANESTHESIA POSTPROCEDURE EVALUATION
Procedure(s):  COLONOSCOPY. MAC    Anesthesia Post Evaluation      Multimodal analgesia: multimodal analgesia used between 6 hours prior to anesthesia start to PACU discharge  Patient location during evaluation: bedside  Patient participation: complete - patient participated  Level of consciousness: awake  Pain score: 0  Pain management: adequate  Airway patency: patent  Anesthetic complications: no  Cardiovascular status: stable  Respiratory status: acceptable  Hydration status: acceptable  Post anesthesia nausea and vomiting:  controlled  Final Post Anesthesia Temperature Assessment:  Normothermia (36.0-37.5 degrees C)      INITIAL Post-op Vital signs:   Vitals Value Taken Time   /65 03/26/21 1607   Temp 37 °C (98.6 °F) 03/26/21 1537   Pulse 79 03/26/21 1615   Resp 17 03/26/21 1615   SpO2 96 % 03/26/21 1615   Vitals shown include unvalidated device data.

## 2021-03-26 NOTE — PERIOP NOTES
Electronic signature pad is not working properly. Pt signed manually for discharge instructions on paper. Copy is on pt's chart. Discharge instructions discussed with patient at bedside and wife over the phone.

## 2021-03-26 NOTE — H&P
HPI: Dalton Dozier is a 65 y.o. male presenting with chief complain of need for crc screen    Past Medical History:   Diagnosis Date   • Bypass graft mechanical complication (HCC)     quadropple   • Diabetes (HCC)    • Glaucoma    • History of staph infection     knee   • HTN (hypertension)    • Hyperlipemia    • SOB (shortness of breath)        Past Surgical History:   Procedure Laterality Date   • HX COLONOSCOPY  2010    normal   • HX CORONARY ARTERY BYPASS GRAFT  2019   • HX CYST REMOVAL      from back   • HX HERNIA REPAIR Bilateral    • HX KNEE REPLACEMENT  2017 and 2018    rigjt and left replacement        Family History   Problem Relation Age of Onset   • Heart Disease Sister    • Heart Attack Brother    • Cancer Brother    • Anemia Brother        Social History     Socioeconomic History   • Marital status:      Spouse name: Not on file   • Number of children: Not on file   • Years of education: Not on file   • Highest education level: Not on file   Tobacco Use   • Smoking status: Former Smoker   • Smokeless tobacco: Never Used   Substance and Sexual Activity   • Alcohol use: Yes     Alcohol/week: 1.0 standard drinks     Types: 1 Shots of liquor per week     Comment: social   • Drug use: Never   • Sexual activity: Yes     Partners: Female       Review of Systems - neg    Outpatient Medications Marked as Taking for the 3/26/21 encounter (Hospital Encounter)   Medication Sig Dispense Refill   • rosuvastatin (CRESTOR) 20 mg tablet Take 1 Tab by mouth nightly. 90 Tab 1   • isosorbide mononitrate ER (IMDUR) 30 mg tablet Take 1 Tab by mouth daily. 90 Tab 3   • lisinopriL (PRINIVIL, ZESTRIL) 5 mg tablet Take 5 mg by mouth daily.     • latanoprost (XALATAN) 0.005 % ophthalmic solution Administer 1 Drop to both eyes nightly.     • multivitamin, tx-iron-ca-min (THERA-M w/ IRON) 9 mg iron-400 mcg tab tablet Take 1 Tab by mouth daily.     • acetaminophen (Tylenol Extra Strength) 500 mg tablet Take 500 mg by mouth  every six (6) hours as needed for Pain.  colesevelam (WELCHOL) 625 mg tablet TAKE 2 TABS BY MOUTH DAILY. TAKE 1,250 MG BY MOUTH DAILY. 180 Tab 1    metFORMIN ER (GLUCOPHAGE XR) 500 mg tablet TAKE 1 TABLET BY MOUTH  TWICE DAILY AFTER MEALS 180 Tab 3    metoprolol succinate (TOPROL-XL) 100 mg tablet Take 100 mg by mouth nightly.  co-enzyme Q-10 (CO Q-10) 100 mg capsule Take 100 mg by mouth daily. Allergies   Allergen Reactions    Atorvastatin Other (comments)     Muscle weakness    Heparin Other (comments)     thrombocytopenia    Heparin Analogues Other (comments)     thrombocytopenia    Medrol [Methylprednisolone] Other (comments)     Muscle weakness    Statins-Hmg-Coa Reductase Inhibitors Other (comments)    Zetia [Ezetimibe] Other (comments)     Muscle joint pain    Zocor [Simvastatin] Other (comments)     Muscle joint pain       Vitals:    03/18/21 1437   Weight: 103 kg (227 lb)   Height: 5' 9\" (1.753 m)       Physical Exam  Constitutional:       Appearance: He is well-developed. HENT:      Head: Normocephalic and atraumatic. Eyes:      Conjunctiva/sclera: Conjunctivae normal.   Abdominal:      General: There is no distension. Palpations: Abdomen is soft. There is no mass. Tenderness: There is no abdominal tenderness. Musculoskeletal: Normal range of motion. Lymphadenopathy:      Cervical: No cervical adenopathy. Skin:     General: Skin is warm and dry. Neurological:      Sensory: No sensory deficit. Psychiatric:         Speech: Speech normal.         Assessment / Plan    colonoscopy    The diagnoses and plan were discussed with the patient. All questions answered. Plan of care agreed to by all concerned.

## 2021-03-26 NOTE — PROCEDURES
[facility]  [address], [address] [facility],[address]   [place], [address]  [phone]                    Colonoscopy Procedure Note      Gerda Johns  1955  [id number]                Date of Procedure: 3/26/2021    Preoperative diagnosis: Colon cancer Screening:  Z12.11    Postoperative diagnosis: normal    :  Yobani Kelley MD    Assistant(s): Endoscopy Technician-1: Jenny Lundberg 43 Staff: Seun Villarreal RN    Sedation: MAC    Complications: None    Implants: None    Procedure Details:  Prior to the procedure, a history and physical were performed. The patients medications, allergies and sensitivities were reviewed and all questions were answered. After informed consent was obtained for the procedure, with all risks and benefits of procedure explained. The patient was taken to the endoscopy suite and placed in the left lateral decubitus position. Patient identification and proposed procedure were verified prior to the procedure by the nurse and I. After sequential anesthesia administered by anesthesiologist, a digital rectal exam was performed and was normal.  The Olympus video colonoscope was introduced through the anus and advanced to cecum, which was identified by the ileocecal valve and appendiceal orifice. The quality of preparation was excellent. The colonoscope was slowly withdrawn and the mucosa examined for any abnormalities. Cecal withdrawal time was greater than 6 minutes. The patient tolerated the procedure well. There were no complications. Findings/Interventions:   Polyps -none    EBL: none    Recommendations: -For colon cancer screening in this average-risk patient, colonoscopy may be repeated in 10 years. Resume normal medication(s).      Discharge Disposition:  Carrie Nichole MD  3/26/2021  3:31 PM

## 2021-04-27 RX ORDER — COLESEVELAM 180 1/1
TABLET ORAL
Qty: 180 TAB | Refills: 1 | Status: SHIPPED | OUTPATIENT
Start: 2021-04-27 | End: 2021-10-26

## 2021-07-19 ENCOUNTER — OFFICE VISIT (OUTPATIENT)
Dept: FAMILY MEDICINE CLINIC | Age: 66
End: 2021-07-19
Payer: MEDICARE

## 2021-07-19 VITALS
BODY MASS INDEX: 33.92 KG/M2 | HEART RATE: 76 BPM | OXYGEN SATURATION: 94 % | DIASTOLIC BLOOD PRESSURE: 74 MMHG | TEMPERATURE: 98 F | HEIGHT: 69 IN | WEIGHT: 229 LBS | SYSTOLIC BLOOD PRESSURE: 111 MMHG | RESPIRATION RATE: 18 BRPM

## 2021-07-19 DIAGNOSIS — G62.9 NEUROPATHY: ICD-10-CM

## 2021-07-19 DIAGNOSIS — E11.65 TYPE 2 DIABETES MELLITUS WITH HYPERGLYCEMIA, WITHOUT LONG-TERM CURRENT USE OF INSULIN (HCC): Primary | ICD-10-CM

## 2021-07-19 DIAGNOSIS — R53.1 GENERALIZED WEAKNESS: ICD-10-CM

## 2021-07-19 DIAGNOSIS — E55.9 HYPOVITAMINOSIS D: ICD-10-CM

## 2021-07-19 DIAGNOSIS — E07.9 THYROID DISORDER: ICD-10-CM

## 2021-07-19 DIAGNOSIS — M79.10 MYALGIA: ICD-10-CM

## 2021-07-19 DIAGNOSIS — E11.9 COMPREHENSIVE DIABETIC FOOT EXAMINATION, TYPE 2 DM, ENCOUNTER FOR (HCC): ICD-10-CM

## 2021-07-19 DIAGNOSIS — I25.708 CORONARY ARTERY DISEASE OF BYPASS GRAFT OF NATIVE HEART WITH STABLE ANGINA PECTORIS (HCC): ICD-10-CM

## 2021-07-19 DIAGNOSIS — Z12.5 SCREENING FOR MALIGNANT NEOPLASM OF PROSTATE: ICD-10-CM

## 2021-07-19 DIAGNOSIS — I10 ESSENTIAL HYPERTENSION: ICD-10-CM

## 2021-07-19 DIAGNOSIS — E78.5 DYSLIPIDEMIA: ICD-10-CM

## 2021-07-19 LAB
CREAT SERPL-MCNC: 50 MG/DL
HBA1C MFR BLD HPLC: 6.6 %
MICROALBUMIN UR TEST STR-MCNC: 10 MG/L
MICROALBUMIN/CREAT RATIO POC: ABNORMAL MG/G

## 2021-07-19 PROCEDURE — 2022F DILAT RTA XM EVC RTNOPTHY: CPT | Performed by: FAMILY MEDICINE

## 2021-07-19 PROCEDURE — G8754 DIAS BP LESS 90: HCPCS | Performed by: FAMILY MEDICINE

## 2021-07-19 PROCEDURE — G8536 NO DOC ELDER MAL SCRN: HCPCS | Performed by: FAMILY MEDICINE

## 2021-07-19 PROCEDURE — 83036 HEMOGLOBIN GLYCOSYLATED A1C: CPT | Performed by: FAMILY MEDICINE

## 2021-07-19 PROCEDURE — G8752 SYS BP LESS 140: HCPCS | Performed by: FAMILY MEDICINE

## 2021-07-19 PROCEDURE — G8510 SCR DEP NEG, NO PLAN REQD: HCPCS | Performed by: FAMILY MEDICINE

## 2021-07-19 PROCEDURE — 3044F HG A1C LEVEL LT 7.0%: CPT | Performed by: FAMILY MEDICINE

## 2021-07-19 PROCEDURE — G8427 DOCREV CUR MEDS BY ELIG CLIN: HCPCS | Performed by: FAMILY MEDICINE

## 2021-07-19 PROCEDURE — G8417 CALC BMI ABV UP PARAM F/U: HCPCS | Performed by: FAMILY MEDICINE

## 2021-07-19 PROCEDURE — 3017F COLORECTAL CA SCREEN DOC REV: CPT | Performed by: FAMILY MEDICINE

## 2021-07-19 PROCEDURE — 82044 UR ALBUMIN SEMIQUANTITATIVE: CPT | Performed by: FAMILY MEDICINE

## 2021-07-19 PROCEDURE — 1101F PT FALLS ASSESS-DOCD LE1/YR: CPT | Performed by: FAMILY MEDICINE

## 2021-07-19 PROCEDURE — 99215 OFFICE O/P EST HI 40 MIN: CPT | Performed by: FAMILY MEDICINE

## 2021-07-19 NOTE — PROGRESS NOTES
Chief Complaint   Patient presents with    Follow Up Chronic Condition    Numbness     and tingling in legs     Breathing Problem     1. Have you been to the ER, urgent care clinic since your last visit? Hospitalized since your last visit? No    2. Have you seen or consulted any other health care providers outside of the [address] [place] [address] since your last visit? Include any pap smears or colon screening. No     HPI  Tomeka Odor comes in for follow-up care. Numbness and tingling: Patient has numbness and tingling especially of this region and hands. This is likely due to diabetic neuropathy. It is not disabling. If it worsens we may need to be on medication like Lyrica I will check his labs. Shortness of breath: Patient has exertional shortness of breath that comes on and off. This is with activity. He denies chest pain or diaphoresis. Patient has been followed up by the endocrinologist and was seen recently. Noted to be stable. Muscle aches: Patient has generalized muscle aches. This affects upper and lower extremities. He is on a statin medication wonders whether this is causing the aches. We will check labs. I will check his creatinine kinase levels. Diabetes mellitus type 2: Patient has type 2 diabetes mellitus. He is on Metformin. Tolerating the medication. I did a foot exam that is indicative of some neuropathy in the toes. Patient states that his blood glucose numbers have also been stable. We did an HbA1c. This is at 6.6. Hypertension: Patient has hypertension. Blood pressure is stable. He denies headache, changes in vision or focal weakness. He is on lisinopril, Imdur and metoprolol. He will continue with the current treatment plan. We will check labs. CAD: Patient has coronary artery disease and has had quadruple bypass in the past.  He is followed up by the cardiologist.  He has shortness of breath that comes on and off.   Denies chest pain, diaphoresis or chest pressure. He is on metoprolol, lisinopril, Imdur, Crestor and takes aspirin daily. We will continue the current treatment plan. Dyslipidemia: Patient has dyslipidemia. He is on Crestor. Wonders whether this is giving him muscle aches. We will check his CK levels. Patient takes coenzyme Q 10. Obesity: Patient has a BMI of 33.82. He will intensify lifestyle and dietary modification. Past Medical History  Past Medical History:   Diagnosis Date    Bypass graft mechanical complication (HCC)     quadropple    Diabetes ([address]     Glaucoma     History of staph infection     knee    HTN (hypertension)     Hyperlipemia     SOB (shortness of breath)        Surgical History  Past Surgical History:   Procedure Laterality Date    COLONOSCOPY N/A 3/26/2021    COLONOSCOPY performed by Teresa Harrington MD at [facility]    HX COLONOSCOPY  2010    normal    HX CORONARY ARTERY BYPASS GRAFT  2019    HX CYST REMOVAL      from back    HX HERNIA REPAIR Bilateral     HX KNEE REPLACEMENT  2017 and 2018    rigjt and left replacement         Medications  Current Outpatient Medications   Medication Sig Dispense Refill    colesevelam (WELCHOL) 625 mg tablet TAKE 2 TABS BY MOUTH DAILY. 180 Tab 1    rosuvastatin (CRESTOR) 20 mg tablet Take 1 Tab by mouth nightly. 90 Tab 1    isosorbide mononitrate ER (IMDUR) 30 mg tablet Take 1 Tab by mouth daily. 90 Tab 3    lisinopriL (PRINIVIL, ZESTRIL) 5 mg tablet Take 5 mg by mouth daily.  latanoprost (XALATAN) 0.005 % ophthalmic solution Administer 1 Drop to both eyes nightly.  multivitamin, tx-iron-ca-min (THERA-M w/ IRON) 9 mg iron-400 mcg tab tablet Take 1 Tab by mouth daily.  acetaminophen (Tylenol Extra Strength) 500 mg tablet Take 500 mg by mouth every six (6) hours as needed for Pain.  metFORMIN ER (GLUCOPHAGE XR) 500 mg tablet TAKE 1 TABLET BY MOUTH  TWICE DAILY AFTER MEALS 180 Tab 3    lancets misc Check blood glucose  daily.   Patient would like One Touch Delica Plus Lancets 776 Each 3    glucose blood VI test strips (blood glucose test) strip Check blood glucose  daily. Patient would like One Touch Yashira test strips 100 Strip 3    metoprolol succinate (TOPROL-XL) 100 mg tablet Take 100 mg by mouth nightly.  aspirin delayed-release 81 mg tablet Take 81 mg by mouth daily.  co-enzyme Q-10 (CO Q-10) 100 mg capsule Take 100 mg by mouth daily. Allergies  Allergies   Allergen Reactions    Atorvastatin Other (comments)     Muscle weakness    Heparin Other (comments)     thrombocytopenia    Heparin Analogues Other (comments)     thrombocytopenia    Medrol [Methylprednisolone] Other (comments)     Muscle weakness    Statins-Hmg-Coa Reductase Inhibitors Other (comments)    Zetia [Ezetimibe] Other (comments)     Muscle joint pain    Zocor [Simvastatin] Other (comments)     Muscle joint pain       Family History  Family History   Problem Relation Age of Onset    Heart Disease Sister     Heart Attack Brother     Cancer Brother     Anemia Brother        Social History  Social History     Socioeconomic History    Marital status:      Spouse name: Not on file    Number of children: Not on file    Years of education: Not on file    Highest education level: Not on file   Occupational History    Not on file   Tobacco Use    Smoking status: Former Smoker    Smokeless tobacco: Never Used   Vaping Use    Vaping Use: Never used   Substance and Sexual Activity    Alcohol use:  Yes     Alcohol/week: 1.0 standard drinks     Types: 1 Shots of liquor per week     Comment: social    Drug use: Never    Sexual activity: Yes     Partners: Female   Other Topics Concern    Not on file   Social History Narrative    Not on file     Social Determinants of Health     Financial Resource Strain:     Difficulty of Paying Living Expenses:    Food Insecurity:     Worried About Running Out of Food in the Last Year:     [address] in the Last Year:    Transportation Needs:     Lack of Transportation (Medical):  Lack of Transportation (Non-Medical):    Physical Activity:     Days of Exercise per Week:     Minutes of Exercise per Session:    Stress:     Feeling of Stress :    Social Connections:     Frequency of Communication with Friends and Family:     Frequency of Social Gatherings with Friends and Family:     Attends Faith Services:     Active Member of Clubs or Organizations:     Attends Club or Organization Meetings:     Marital Status:    Intimate Partner Violence:     Fear of Current or Ex-Partner:     Emotionally Abused:     Physically Abused:     Sexually Abused:        Review of Systems  Review of Systems - Review of all systems is negative except as noted above in the HPI.     Vital Signs  Visit Vitals  /74 (BP 1 Location: Left upper arm, BP Patient Position: Sitting, BP Cuff Size: Adult)   Pulse 76   Temp 98 °F (36.7 °C) (Oral)   Resp 18   Ht 5' 9\" (1.753 m)   Wt 229 lb (103.9 kg)   SpO2 94% Comment: 94   BMI 33.82 kg/m²         Physical Exam  Physical Examination: General appearance - oriented to person, place, and time, overweight, acyanotic, in no respiratory distress and well hydrated  Mental status - affect appropriate to mood  Neck - supple, no significant adenopathy  Lymphatics - no palpable lymphadenopathy  Chest - no tachypnea, retractions or cyanosis  Heart - systolic murmur 2/6 at lower left sternal border  Abdomen - no rebound tenderness noted  Back exam - limited range of motion  Neurological - abnormal neurological exam unchanged from prior examinations  Musculoskeletal - osteoarthritic changes noted in both hands  Extremities - intact peripheral pulses  Diabetic foot exam:     Left Foot:   Visual Exam: normal    Pulse DP: 2+ (normal)   Filament test: reduced sensation        Right Foot:   Visual Exam: normal    Pulse DP: 2+ (normal)   Filament test: reduced sensation      Results  Results for orders placed or performed during the hospital encounter of 03/26/21   GLUCOSE, POC   Result Value Ref Range    Glucose (POC) 125 (H) 70 - 110 mg/dL   GLUCOSE, POC   Result Value Ref Range    Glucose (POC) 108 70 - 110 mg/dL       ASSESSMENT and PLAN    ICD-10-CM ICD-9-CM    1. Type 2 diabetes mellitus with hyperglycemia, without long-term current use of insulin (HCC)  E11.65 250.00 AMB POC HEMOGLOBIN A1C     790.29 AMB POC URINE, MICROALBUMIN, SEMIQUANTITATIVE      HM DIABETES FOOT EXAM      METABOLIC PANEL, COMPREHENSIVE      CBC WITH AUTOMATED DIFF   2. Dyslipidemia  E78.5 272.4 LIPID PANEL   3. Coronary artery disease of bypass graft of native heart with stable angina pectoris (HCC)  I25.708 414.05 MAGNESIUM     413.9    4. Neuropathy  G62.9 355.9    5. Essential hypertension  I10 401.9    6. Screening for malignant neoplasm of prostate  Z12.5 V76.44 PSA SCREENING (SCREENING)   7. Comprehensive diabetic foot examination, type 2 DM, encounter for ([facility]  E11.9 250.00  DIABETES FOOT EXAM   8. Myalgia  M79.10 729.1 CK   9. Thyroid disorder  E07.9 246.9 TSH 3RD GENERATION   10. Generalized weakness  R53.1 780.79    11.  Hypovitaminosis D  E55.9 268.9 VITAMIN D, 25 HYDROXY     lab results and schedule of future lab studies reviewed with patient  reviewed diet, exercise and weight control  cardiovascular risk and specific lipid/LDL goals reviewed  reviewed medications and side effects in detail  specific diabetic recommendations: low cholesterol diet, weight control and daily exercise discussed, home glucose monitoring emphasized, all medications, side effects and compliance discussed carefully, foot care discussed and Podiatry visits discussed, annual eye examinations at Ophthalmology discussed, glycohemoglobin and other lab monitoring discussed and long term diabetic complications discussed  use of aspirin to prevent MI and TIA's discussed      I have discussed the diagnosis with the patient and the intended plan of care as seen in the above orders. The patient has received an after-visit summary and questions were answered concerning future plans. I have discussed medication, side effects, and warnings with the patient in detail. The patient verbalized understanding and is in agreement with the plan of care. The patient will contact the office with any additional concerns. I spent at least 43 minutes on this visit with this established patient. Ruben George MD    PLEASE NOTE:   This document has been produced using voice recognition software.  Unrecognized errors in transcription may be present

## 2021-08-04 ENCOUNTER — APPOINTMENT (OUTPATIENT)
Dept: FAMILY MEDICINE CLINIC | Age: 66
End: 2021-08-04

## 2021-08-04 ENCOUNTER — HOSPITAL ENCOUNTER (OUTPATIENT)
Dept: LAB | Age: 66
Discharge: HOME | End: 2021-08-04
Payer: MEDICARE

## 2021-08-04 DIAGNOSIS — Z12.5 SCREENING FOR MALIGNANT NEOPLASM OF PROSTATE: ICD-10-CM

## 2021-08-04 DIAGNOSIS — E07.9 THYROID DISORDER: ICD-10-CM

## 2021-08-04 DIAGNOSIS — E11.65 TYPE 2 DIABETES MELLITUS WITH HYPERGLYCEMIA, WITHOUT LONG-TERM CURRENT USE OF INSULIN (HCC): ICD-10-CM

## 2021-08-04 DIAGNOSIS — M79.10 MYALGIA: ICD-10-CM

## 2021-08-04 DIAGNOSIS — E78.5 DYSLIPIDEMIA: ICD-10-CM

## 2021-08-04 DIAGNOSIS — E55.9 HYPOVITAMINOSIS D: ICD-10-CM

## 2021-08-04 DIAGNOSIS — I25.708 CORONARY ARTERY DISEASE OF BYPASS GRAFT OF NATIVE HEART WITH STABLE ANGINA PECTORIS (HCC): ICD-10-CM

## 2021-08-04 LAB
25(OH)D3 SERPL-MCNC: 39.6 NG/ML (ref 30–100)
ALBUMIN SERPL-MCNC: 3.7 G/DL (ref 3.4–5)
ALBUMIN/GLOB SERPL: 1.1 {RATIO} (ref 0.8–1.7)
ALP SERPL-CCNC: 64 U/L (ref 45–117)
ALT SERPL-CCNC: 33 U/L (ref 16–61)
ANION GAP SERPL CALC-SCNC: 2 MMOL/L (ref 3–18)
AST SERPL-CCNC: 16 U/L (ref 10–38)
BASOPHILS # BLD: 0 K/UL (ref 0–0.1)
BASOPHILS NFR BLD: 0 % (ref 0–2)
BILIRUB SERPL-MCNC: 0.7 MG/DL (ref 0.2–1)
BUN SERPL-MCNC: 15 MG/DL (ref 7–18)
BUN/CREAT SERPL: 17 (ref 12–20)
CALCIUM SERPL-MCNC: 9 MG/DL (ref 8.5–10.1)
CHLORIDE SERPL-SCNC: 105 MMOL/L (ref 100–111)
CHOLEST SERPL-MCNC: 130 MG/DL
CK SERPL-CCNC: 187 U/L (ref 39–308)
CO2 SERPL-SCNC: 28 MMOL/L (ref 21–32)
CREAT SERPL-MCNC: 0.9 MG/DL (ref 0.6–1.3)
DIFFERENTIAL METHOD BLD: ABNORMAL
EOSINOPHIL # BLD: 0.1 K/UL (ref 0–0.4)
EOSINOPHIL NFR BLD: 2 % (ref 0–5)
ERYTHROCYTE [DISTWIDTH] IN BLOOD BY AUTOMATED COUNT: 12.4 % (ref 11.6–14.5)
GLOBULIN SER CALC-MCNC: 3.4 G/DL (ref 2–4)
GLUCOSE SERPL-MCNC: 117 MG/DL (ref 74–99)
HCT VFR BLD AUTO: 43.3 % (ref 36–48)
HDLC SERPL-MCNC: 39 MG/DL (ref 40–60)
HDLC SERPL: 3.3 {RATIO} (ref 0–5)
HGB BLD-MCNC: 14.1 G/DL (ref 13–16)
LDLC SERPL CALC-MCNC: 65.6 MG/DL (ref 0–100)
LIPID PROFILE,FLP: ABNORMAL
LYMPHOCYTES # BLD: 1.5 K/UL (ref 0.9–3.6)
LYMPHOCYTES NFR BLD: 24 % (ref 21–52)
MAGNESIUM SERPL-MCNC: 2.1 MG/DL (ref 1.6–2.6)
MCH RBC QN AUTO: 32 PG (ref 24–34)
MCHC RBC AUTO-ENTMCNC: 32.6 G/DL (ref 31–37)
MCV RBC AUTO: 98.4 FL (ref 74–97)
MONOCYTES # BLD: 0.7 K/UL (ref 0.05–1.2)
MONOCYTES NFR BLD: 11 % (ref 3–10)
NEUTS SEG # BLD: 4 K/UL (ref 1.8–8)
NEUTS SEG NFR BLD: 63 % (ref 40–73)
PLATELET # BLD AUTO: 222 K/UL (ref 135–420)
PMV BLD AUTO: 9.5 FL (ref 9.2–11.8)
POTASSIUM SERPL-SCNC: 4.5 MMOL/L (ref 3.5–5.5)
PROT SERPL-MCNC: 7.1 G/DL (ref 6.4–8.2)
PSA SERPL-MCNC: 2.3 NG/ML (ref 0–4)
RBC # BLD AUTO: 4.4 M/UL (ref 4.35–5.65)
SODIUM SERPL-SCNC: 135 MMOL/L (ref 136–145)
TRIGL SERPL-MCNC: 127 MG/DL (ref ?–150)
TSH SERPL DL<=0.05 MIU/L-ACNC: 0.96 UIU/ML (ref 0.36–3.74)
VLDLC SERPL CALC-MCNC: 25.4 MG/DL
WBC # BLD AUTO: 6.4 K/UL (ref 4.6–13.2)

## 2021-08-04 PROCEDURE — 82550 ASSAY OF CK (CPK): CPT

## 2021-08-04 PROCEDURE — 85025 COMPLETE CBC W/AUTO DIFF WBC: CPT

## 2021-08-04 PROCEDURE — 84443 ASSAY THYROID STIM HORMONE: CPT

## 2021-08-04 PROCEDURE — 84153 ASSAY OF PSA TOTAL: CPT

## 2021-08-04 PROCEDURE — 82306 VITAMIN D 25 HYDROXY: CPT

## 2021-08-04 PROCEDURE — 83735 ASSAY OF MAGNESIUM: CPT

## 2021-08-04 PROCEDURE — 80061 LIPID PANEL: CPT

## 2021-08-04 PROCEDURE — 36415 COLL VENOUS BLD VENIPUNCTURE: CPT

## 2021-08-04 PROCEDURE — 80053 COMPREHEN METABOLIC PANEL: CPT

## 2021-08-11 DIAGNOSIS — E78.5 DYSLIPIDEMIA: ICD-10-CM

## 2021-08-12 ENCOUNTER — TELEPHONE (OUTPATIENT)
Dept: FAMILY MEDICINE CLINIC | Age: 66
End: 2021-08-12

## 2021-08-12 RX ORDER — ROSUVASTATIN CALCIUM 20 MG/1
TABLET, COATED ORAL
Qty: 90 TABLET | Refills: 3 | Status: SHIPPED | OUTPATIENT
Start: 2021-08-12 | End: 2022-04-19 | Stop reason: DRUGHIGH

## 2021-08-16 NOTE — TELEPHONE ENCOUNTER
Labs has not been reviewed by PCP at this time. Patient will get a call once results has been reviewed

## 2021-08-17 NOTE — TELEPHONE ENCOUNTER
Spoke with patient at this time. Patient states he has been having muscles weakness and cramps. He was wondering if pcp think it could it be coming from the crestor. Patient asking if provider think it will be ok to change crestor to 10mg at this time. Please advise

## 2021-08-17 NOTE — PROGRESS NOTES
Please let patient know his lab results are reassuring. Blood glucose was 117. His last HbA1c was 6.6 which indicates good control of diabetes. He is on Metformin. He will continue with the current treatment plan.   Adrian Scruggs MD

## 2021-08-20 NOTE — TELEPHONE ENCOUNTER
We can try cut back on the Crestor 10 mg daily and recheck lipid panel in 3 months. He should continue to exercise and take a diet low in polysaturated fats. If he is agreeable I can send in a prescription at this dosage.   Mahin Rocha MD

## 2021-09-14 ENCOUNTER — OFFICE VISIT (OUTPATIENT)
Dept: CARDIOLOGY CLINIC | Age: 66
End: 2021-09-14
Payer: MEDICARE

## 2021-09-14 VITALS
DIASTOLIC BLOOD PRESSURE: 70 MMHG | WEIGHT: 229 LBS | HEIGHT: 69 IN | SYSTOLIC BLOOD PRESSURE: 120 MMHG | HEART RATE: 86 BPM | BODY MASS INDEX: 33.92 KG/M2

## 2021-09-14 DIAGNOSIS — I48.0 PAROXYSMAL ATRIAL FIBRILLATION (HCC): ICD-10-CM

## 2021-09-14 DIAGNOSIS — I25.10 CORONARY ARTERY DISEASE INVOLVING NATIVE CORONARY ARTERY OF NATIVE HEART WITHOUT ANGINA PECTORIS: Primary | ICD-10-CM

## 2021-09-14 DIAGNOSIS — R06.02 SHORTNESS OF BREATH: ICD-10-CM

## 2021-09-14 DIAGNOSIS — R91.1 LUNG NODULE: ICD-10-CM

## 2021-09-14 DIAGNOSIS — E78.5 HYPERLIPIDEMIA, UNSPECIFIED HYPERLIPIDEMIA TYPE: ICD-10-CM

## 2021-09-14 DIAGNOSIS — Z95.1 HX OF CABG: ICD-10-CM

## 2021-09-14 PROCEDURE — G8536 NO DOC ELDER MAL SCRN: HCPCS | Performed by: INTERNAL MEDICINE

## 2021-09-14 PROCEDURE — G8754 DIAS BP LESS 90: HCPCS | Performed by: INTERNAL MEDICINE

## 2021-09-14 PROCEDURE — 99214 OFFICE O/P EST MOD 30 MIN: CPT | Performed by: INTERNAL MEDICINE

## 2021-09-14 PROCEDURE — G8427 DOCREV CUR MEDS BY ELIG CLIN: HCPCS | Performed by: INTERNAL MEDICINE

## 2021-09-14 PROCEDURE — 3017F COLORECTAL CA SCREEN DOC REV: CPT | Performed by: INTERNAL MEDICINE

## 2021-09-14 PROCEDURE — 1101F PT FALLS ASSESS-DOCD LE1/YR: CPT | Performed by: INTERNAL MEDICINE

## 2021-09-14 PROCEDURE — G8417 CALC BMI ABV UP PARAM F/U: HCPCS | Performed by: INTERNAL MEDICINE

## 2021-09-14 PROCEDURE — G8432 DEP SCR NOT DOC, RNG: HCPCS | Performed by: INTERNAL MEDICINE

## 2021-09-14 PROCEDURE — G8752 SYS BP LESS 140: HCPCS | Performed by: INTERNAL MEDICINE

## 2021-09-14 NOTE — PROGRESS NOTES
HISTORY OF PRESENT ILLNESS  oDnna Connell is a 76 y.o. male. 2/12/2021  Patient seen today for new patient evaluation he has history of CAD, paroxysmal A. fib prior CABG and hyperlipidemia. Patient is recovering of increasing shortness of breath. Exertional shortness of breath is persistent since CABG    Patient has increased shortness of breath on exertion happens with climbing stairs his activity. Occasional chest pain which are short lasting left-sided not related activity or exertion. Denies orthopnea PND no peripheral edema. Patient had prior PCI's and subsequent CABG in December 2019. He had a complex course requiring reopening of the chest.  Postoperatively he had atrial fibrillation with no recurrence after that. His anticoagulation has been discontinued since then. 9/2021  Patient is here for follow-up. Patient has again noticed increasing shortness of breath. Happens on minimal activity exertion. Also complain of left-sided dull pain not related to activity exertion lasting less than a minute at rest.    Follow-up  Associated symptoms include shortness of breath. Pertinent negatives include no chest pain, no abdominal pain and no headaches. Review of Systems   Constitutional: Negative for chills and fever. HENT: Negative for nosebleeds. Eyes: Negative for blurred vision and double vision. Respiratory: Positive for shortness of breath. Negative for cough, hemoptysis, sputum production and wheezing. Cardiovascular: Negative for chest pain, palpitations, orthopnea, claudication, leg swelling and PND. Gastrointestinal: Negative for abdominal pain, heartburn, nausea and vomiting. Musculoskeletal: Negative for myalgias. Skin: Negative for rash. Neurological: Negative for dizziness, weakness and headaches. Endo/Heme/Allergies: Does not bruise/bleed easily.      Family History   Problem Relation Age of Onset    Heart Disease Sister     Heart Attack Brother     Cancer Brother     Anemia Brother        Past Medical History:   Diagnosis Date    Bypass graft mechanical complication ([facility]     quadropple    Diabetes ([facility]     Glaucoma     History of staph infection     knee    HTN (hypertension)     Hyperlipemia     SOB (shortness of breath)        Past Surgical History:   Procedure Laterality Date    COLONOSCOPY N/A 3/26/2021    COLONOSCOPY performed by Sal Boone MD at [address] HX COLONOSCOPY  2010    normal    HX CORONARY ARTERY BYPASS GRAFT  2019    HX CYST REMOVAL      from back    HX HERNIA REPAIR Bilateral     HX KNEE REPLACEMENT  2017 and 2018    rigjt and left replacement        Social History     Tobacco Use    Smoking status: Former Smoker    Smokeless tobacco: Never Used   Substance Use Topics    Alcohol use: Yes     Alcohol/week: 1.0 standard drinks     Types: 1 Shots of liquor per week     Comment: social       Allergies   Allergen Reactions    Atorvastatin Other (comments)     Muscle weakness    Heparin Other (comments)     thrombocytopenia    Heparin Analogues Other (comments)     thrombocytopenia    Medrol [Methylprednisolone] Other (comments)     Muscle weakness    Statins-Hmg-Coa Reductase Inhibitors Other (comments)    Zetia [Ezetimibe] Other (comments)     Muscle joint pain    Zocor [Simvastatin] Other (comments)     Muscle joint pain       Prior to Admission medications    Medication Sig Start Date End Date Taking? Authorizing Provider   rosuvastatin (CRESTOR) 20 mg tablet TAKE 1 TABLET BY MOUTH AT  NIGHT 8/12/21  Yes Malena Grider MD   colesevelam (WELCHOL) 625 mg tablet TAKE 2 TABS BY MOUTH DAILY. 4/27/21  Yes Malena Grider MD   isosorbide mononitrate ER (IMDUR) 30 mg tablet Take 1 Tab by mouth daily. 3/9/21  Yes Kiko Purdy NP   lisinopriL (PRINIVIL, ZESTRIL) 5 mg tablet Take 5 mg by mouth daily.    Yes Provider, Historical   latanoprost (XALATAN) 0.005 % ophthalmic solution Administer 1 Drop to both eyes nightly. Yes Provider, Historical   multivitamin, tx-iron-ca-min (THERA-M w/ IRON) 9 mg iron-400 mcg tab tablet Take 1 Tab by mouth daily. Yes Provider, Historical   acetaminophen (Tylenol Extra Strength) 500 mg tablet Take 500 mg by mouth every six (6) hours as needed for Pain. Yes Provider, Historical   metFORMIN ER (GLUCOPHAGE XR) 500 mg tablet TAKE 1 TABLET BY MOUTH  TWICE DAILY AFTER MEALS 9/21/20  Yes Malena Sheldon MD   lancets misc Check blood glucose  daily. Patient would like One Touch Delica Plus Lancets 7/01/52  Yes Tim Ng MD   glucose blood VI test strips (blood glucose test) strip Check blood glucose  daily. Patient would like One Touch Yashira test strips 4/14/20  Yes Malena Grider MD   metoprolol succinate (TOPROL-XL) 100 mg tablet Take 100 mg by mouth nightly. 12/31/19  Yes Provider, Historical   aspirin delayed-release 81 mg tablet Take 81 mg by mouth daily. Yes Provider, Historical   co-enzyme Q-10 (CO Q-10) 100 mg capsule Take 100 mg by mouth daily. Yes Provider, Historical         Visit Vitals  /70   Pulse 86   Ht 5' 9\" (1.753 m)   Wt 103.9 kg (229 lb)   BMI 33.82 kg/m²         Physical Exam  Constitutional:       Appearance: He is well-developed. HENT:      Head: Normocephalic and atraumatic. Eyes:      Conjunctiva/sclera: Conjunctivae normal.   Neck:      Thyroid: No thyromegaly. Vascular: No JVD. Trachea: No tracheal deviation. Cardiovascular:      Rate and Rhythm: Normal rate and regular rhythm. Heart sounds: Normal heart sounds. No murmur heard. No friction rub. No gallop. Pulmonary:      Effort: No respiratory distress. Breath sounds: Normal breath sounds. No wheezing or rales. Chest:      Chest wall: No tenderness. Abdominal:      Palpations: Abdomen is soft. Tenderness: There is no abdominal tenderness. Musculoskeletal:      Cervical back: Neck supple. Skin:     General: Skin is warm and dry.    Neurological: Mental Status: He is alert and oriented to person, place, and time. Mr. Clay Lima has a reminder for a \"due or due soon\" health maintenance. I have asked that he contact his primary care provider for follow-up on this health maintenance. No flowsheet data found. I have personally reviewed patient's records available from hospital and other providers and incorporated findings in patient care. Notes, lab, ED EKG, chest x-ray, stress test  CORONARY ANGIOGRAPHY:   LM:  Large caliber vessel with no significant disease.    LAD: Large caliber vessel with three diagonal branches and small   septal  vessels with a 60% tubular stenosis within a   previously placed stent in the proximal LAD. The mid LAD has a   50% tubular stenosis. The remainder of the LAD has mild luminal   irregularities.  There are left to right collaterals arising from   the septal perforators. --1st Diagonal: Small caliber vessel with no significant disease. --2nd Diagonal: Small caliber vessel with mild diffuse disease. LEFT CX:  Large caliber, co-dominant vessel with 80% ostial   stenosis.   --OM1: Moderate caliber vessel with a 50% tubular eccentric   stenosis in the mid-segment. Ramesh Brittle --AV groove Cx: Small caliber vessel with mild diffuse disease. RAMUS: Large caliber vessel with 50% proximal stenosis followed   by a 90% focal stenosis in the proximal to mid segment. RCA: The right coronary artery is a moderate to large caliber,   co-dominant vessel with a 80% focal stenosis in the proximal   segment. The mid-segment has an aneurysmal segment followed by a   30% focal stenosis. --PDA: Moderate caliber vessel with no significant disease. --CAROLYNE: Moderate caliber vessel with 30% tubular stenosis in the   mid-segment.       IMPRESSION:  1. Multivessel Coronary Artery Disease. 2. Normal left ventricular end diastolic pressure.    12/2019  OPERATION PERFORMED:  Coronary artery bypass grafting x4 with 1 artery and 3 veins, left internal mammary artery to left anterior descending, reversed greater saphenous vein to the right coronary, and reversed left greater saphenous vein as a sequential to the circ 1 (ramus) and circ 2, left endoscopic vein harvest, and SPY angiography. ECHO LIMITED STUDY (W/O COLOR)3/13/2020  [employer]  Component Name Value Ref Range   EF Echo 53     Result Impression   :   HYPOKINESIS OF THE BASAL-MID INFERIOR, BASAL-MID INFEROLATERAL AND BASAL INFEROSEPTAL WALLS   WITH PRESERVED GLOBAL LEFT VENTRICULAR SYSTOLIC FUNCTION WITH A CALCULATED BI-PLANE OF 53%. MILDLY DILATED RIGHT VENTRICULAR SIZE WITH REDUCED FUNCTION. INDETERMINATE DIASTOLIC FUNCTION. MILD LEFT VENTRICULAR HYPERTROPHY PRESENT.   BOWING OF THE INTERATRIAL SEPTUM TOWARDS THE LEFT. NO EVIDENCE OF TRICUSPID REGURGITATION. ALEXANDRU Rest/Stress Multiple Spect3/13/2020  [employer]  Component Name Value Ref Range   EF Nuc 65     Result Impression    THIS MYOCARDIAL PERFUSION STUDY IS NORMAL   THIS IS A LOW RISK STUDY   MYOCARDIAL PERFUSION IMAGING IS NORMAL. NO ABNORMALITIES OR EVIDENCE OF ISCHEMIA OR PRIOR INFARCTION. THE CALCULATED LV EJECTION FRACTION WAS 65%. OVERALL LEFT VENTRICULAR SYSTOLIC FUNCTION WAS NORMAL WITHOUT REGIONAL WALL MOTION   ABNORMALITIES. IMPRESSION nuclear stress test7/2020  IMPRESSION:  1. No evidence of reversible myocardial ischemia. 2. This is a low risk study. Interpretation Summary 2/2021    · LV: Estimated LVEF is 55 - 60%. Normal cavity size and systolic function (ejection fraction normal). Mild concentric hypertrophy. Mild hypokinesis of the basal inferoseptal, basal inferior and basal anteroseptal wall(s). Mild (grade 1) left ventricular diastolic dysfunction. · RV: Mildly dilated right ventricle. Mildly reduced systolic function. · RA: Mildly dilated right atrium. · PA: Pulmonary arterial systolic pressure is 20 mmHg. · AO: Mild aortic root dilatation (3.9 cm). Assessment         ICD-10-CM ICD-9-CM    1. Coronary artery disease involving native coronary artery of native heart without angina pectoris  I25.10 414.01     Stable continue current treatment and monitor   2. Hx of CABG  Z95.1 V45.81     Stable monitor   3. Hyperlipidemia, unspecified hyperlipidemia type  E78.5 272.4     Continue treatment lab with PCP   4. Paroxysmal atrial fibrillation (HCC)  I48.0 427.31     Stable asymptomatic monitor   5. Shortness of breath  R06.02 786.05 CT CHEST W WO CONT      PFT DLCO    Increase shortness of breath. Unclear etiology check PFT and follow-up CT scan   6. Lung nodule  R91.1 793.11 CT CHEST W WO CONT      PFT DLCO   2/2021  Seen for evaluation of CAD and multiple other problems. Extensive chart review including prior bypass surgeries cardiac catheterization and lab reviewed. Patient had wall motion abnormality on prior echo so we will follow-up to make sure LV dysfunction has not occurred. If we see that there is significant LV dysfunction consider cardiac catheterization with possible angina equivalent. Patient is on multiple medical management. Recent increase in rosuvastatin was done for elevated LDL. Follow-up lab. If echocardiogram shows no significant change or LV dysfunction consider pulmonary function test once labs open up. This would to assess for any restriction post operatively  no concern of CHF/diuretic as needed  3/2021  Echocardiogram exam with normal ejection fraction no pulmonary hypertension. Symptoms are significantly improved after stopping Lasix. As he does not have any angina and negative nuclear stress test we will also hold Ranexa. Any other treatment. We will hold off on PFT at present  9/2021  Continues to be short of breath. We will follow with PFT as well as follow-up CT for lung nodule that he had before. There are no discontinued medications.     Orders Placed This Encounter    CT CHEST W WO CONT     Standing Status: Future     Standing Expiration Date:   10/14/2022     Order Specific Question:   STAT Creatinine as indicated     Answer: Yes    PFT DLCO     Standing Status:   Future     Standing Expiration Date:   3/14/2022       Follow-up and Dispositions    · Return for F/u after tests.

## 2021-09-15 ENCOUNTER — TELEPHONE (OUTPATIENT)
Dept: CARDIOLOGY CLINIC | Age: 66
End: 2021-09-15

## 2021-09-15 NOTE — TELEPHONE ENCOUNTER
Patient called and stated that scheduling called him and told him that PFT are being schedule out to the beginning of next year.  Please advise

## 2021-09-16 NOTE — TELEPHONE ENCOUNTER
With increasing recent Covid pulmonary function test has been cut back.   We will wait for CT scan result once that is done we can make a decision

## 2021-10-02 ENCOUNTER — HOSPITAL ENCOUNTER (OUTPATIENT)
Dept: CT IMAGING | Age: 66
Discharge: HOME | End: 2021-10-02
Attending: INTERNAL MEDICINE
Payer: MEDICARE

## 2021-10-02 DIAGNOSIS — R91.1 LUNG NODULE: ICD-10-CM

## 2021-10-02 DIAGNOSIS — R06.02 SHORTNESS OF BREATH: ICD-10-CM

## 2021-10-02 LAB — CREAT UR-MCNC: 0.9 MG/DL (ref 0.6–1.3)

## 2021-10-02 PROCEDURE — 74011000636 HC RX REV CODE- 636: Performed by: INTERNAL MEDICINE

## 2021-10-02 PROCEDURE — 71260 CT THORAX DX C+: CPT

## 2021-10-02 PROCEDURE — 82565 ASSAY OF CREATININE: CPT

## 2021-10-02 RX ADMIN — IOPAMIDOL 80 ML: 612 INJECTION, SOLUTION INTRAVENOUS at 10:23

## 2021-10-06 ENCOUNTER — TELEPHONE (OUTPATIENT)
Dept: CARDIOLOGY CLINIC | Age: 66
End: 2021-10-06

## 2021-10-06 NOTE — TELEPHONE ENCOUNTER
----- Message from Twan Pryor MD sent at 10/6/2021  7:45 AM EDT -----  No significant abnormality on CT scan.   Hepatic steatosis-fatty liver is noted follow-up with PCP for that

## 2021-10-06 NOTE — PROGRESS NOTES
No significant abnormality on CT scan.   Hepatic steatosis-fatty liver is noted follow-up with PCP for that

## 2021-10-06 NOTE — TELEPHONE ENCOUNTER
Called and left message on patient voicemail regarding lab/test per Dr. Martha Leahy, lab reviewed No significant abnormality on CT scan. Hepatic steatosis -fatty liver is noted follow -up with PCP for that. If any questions to call office.

## 2021-10-19 ENCOUNTER — OFFICE VISIT (OUTPATIENT)
Dept: FAMILY MEDICINE CLINIC | Age: 66
End: 2021-10-19
Payer: MEDICARE

## 2021-10-19 VITALS
HEART RATE: 76 BPM | TEMPERATURE: 97.5 F | WEIGHT: 230 LBS | DIASTOLIC BLOOD PRESSURE: 82 MMHG | BODY MASS INDEX: 34.07 KG/M2 | RESPIRATION RATE: 18 BRPM | HEIGHT: 69 IN | SYSTOLIC BLOOD PRESSURE: 126 MMHG | OXYGEN SATURATION: 97 %

## 2021-10-19 DIAGNOSIS — E78.5 DYSLIPIDEMIA: ICD-10-CM

## 2021-10-19 DIAGNOSIS — Z23 ENCOUNTER FOR IMMUNIZATION: ICD-10-CM

## 2021-10-19 DIAGNOSIS — R06.02 SOB (SHORTNESS OF BREATH): Primary | ICD-10-CM

## 2021-10-19 DIAGNOSIS — R39.9 LOWER URINARY TRACT SYMPTOMS (LUTS): ICD-10-CM

## 2021-10-19 DIAGNOSIS — I25.708 CORONARY ARTERY DISEASE OF BYPASS GRAFT OF NATIVE HEART WITH STABLE ANGINA PECTORIS (HCC): ICD-10-CM

## 2021-10-19 DIAGNOSIS — E11.65 TYPE 2 DIABETES MELLITUS WITH HYPERGLYCEMIA, WITHOUT LONG-TERM CURRENT USE OF INSULIN (HCC): ICD-10-CM

## 2021-10-19 DIAGNOSIS — K76.0 HEPATIC STEATOSIS: ICD-10-CM

## 2021-10-19 DIAGNOSIS — I10 ESSENTIAL HYPERTENSION: ICD-10-CM

## 2021-10-19 DIAGNOSIS — Z12.5 SCREENING FOR MALIGNANT NEOPLASM OF PROSTATE: ICD-10-CM

## 2021-10-19 PROCEDURE — G8510 SCR DEP NEG, NO PLAN REQD: HCPCS | Performed by: FAMILY MEDICINE

## 2021-10-19 PROCEDURE — G8417 CALC BMI ABV UP PARAM F/U: HCPCS | Performed by: FAMILY MEDICINE

## 2021-10-19 PROCEDURE — 99215 OFFICE O/P EST HI 40 MIN: CPT | Performed by: FAMILY MEDICINE

## 2021-10-19 PROCEDURE — 1101F PT FALLS ASSESS-DOCD LE1/YR: CPT | Performed by: FAMILY MEDICINE

## 2021-10-19 PROCEDURE — G8427 DOCREV CUR MEDS BY ELIG CLIN: HCPCS | Performed by: FAMILY MEDICINE

## 2021-10-19 PROCEDURE — G0008 ADMIN INFLUENZA VIRUS VAC: HCPCS | Performed by: FAMILY MEDICINE

## 2021-10-19 PROCEDURE — G8754 DIAS BP LESS 90: HCPCS | Performed by: FAMILY MEDICINE

## 2021-10-19 PROCEDURE — 3017F COLORECTAL CA SCREEN DOC REV: CPT | Performed by: FAMILY MEDICINE

## 2021-10-19 PROCEDURE — G8752 SYS BP LESS 140: HCPCS | Performed by: FAMILY MEDICINE

## 2021-10-19 PROCEDURE — G8536 NO DOC ELDER MAL SCRN: HCPCS | Performed by: FAMILY MEDICINE

## 2021-10-19 PROCEDURE — 2022F DILAT RTA XM EVC RTNOPTHY: CPT | Performed by: FAMILY MEDICINE

## 2021-10-19 PROCEDURE — 90694 VACC AIIV4 NO PRSRV 0.5ML IM: CPT | Performed by: FAMILY MEDICINE

## 2021-10-19 PROCEDURE — 3044F HG A1C LEVEL LT 7.0%: CPT | Performed by: FAMILY MEDICINE

## 2021-10-19 NOTE — PROGRESS NOTES
After obtaining consent, and per orders of Dr. Sloan Jimenez, injection of flu given by Rivas Bourne. Patient instructed to remain in clinic for 20 minutes afterwards, and to report any adverse reaction to me immediately.

## 2021-10-19 NOTE — PROGRESS NOTES
MORIAH Wilde comes in for f/u care. CAD: Patient has coronary artery disease. Has had quadruple bypass done. He is followed up with a cardiologist.  Has occasional shortness of breath. Denies chest pain or diaphoresis. Recently seen by the cardiologist and noted to be stable. He is on Crestor, Imdur, lisinopril and metoprolol. Also takes aspirin. We will continue with these medications. Dyslipidemia: Patient has dyslipidemia. He is on rosuvastatin. Stable on the medication. Continue current treatment plan. We will recheck lipid panel at next visit. HTN: Patient has hypertension. Blood pressure has been stable. Denies headache, changes in vision or focal weakness. He is on lisinopril and metoprolol. Also takes Imdur. We will continue with current treatment plan. DM2: Patient has type 2 diabetes mellitus. Has not been checking his blood glucose numbers regularly. His last HbA1c was 6.6. We will continue with the current treatment plan. He is on Metformin 500 mg twice a day with meals. Arthralgia: Patient has arthralgia. Gets joint aches especially in his hands. Has early morning stiffness. He can take Tylenol arthritis for pain. Discussed supportive care for this. SOB: Patient has shortness of breath especially with activity. Denies chest pain or diaphoresis. Has been seen by the cardiologist.  Recently had a CT scan done of the chest.  This was reassuring. Was recommended to have PFTs. I will place a referral for him to be seen by the pulmonologist for possible PFTs. Hepatic steatosis: Patient has hepatic steatosis. This was noted on a CT scan recently. Liver function tests have been stable. Discussed referral to see the gastroenterologist.  Discussed weight loss and other causes of hepatic steatosis. He would prefer to hold off on referral to the gastroenterologist at the moment. We will try and lose weight and we will follow-up at next visit.   Obesity: Patient has morbid obesity. BMI is 33.97. He will intensify lifestyle and dietary modification. LUTS: Patient has lower urinary tract symptoms. He has urinary frequency and urgency especially at night. Denies dysuria, hematuria or pyuria. I will check his PSA. I will order a urinalysis. I will follow-up with the results. May need to be seen by the urologist.  Health maintenance: Patient will get the flu vaccine today. Past Medical History  Past Medical History:   Diagnosis Date    Bypass graft mechanical complication (HCC)     quadropple    Diabetes ([address]     Glaucoma     History of staph infection     knee    HTN (hypertension)     Hyperlipemia     SOB (shortness of breath)        Surgical History  Past Surgical History:   Procedure Laterality Date    COLONOSCOPY N/A 3/26/2021    COLONOSCOPY performed by Cherylene Slimmer, MD at [address] HX COLONOSCOPY  2010    normal    HX CORONARY ARTERY BYPASS GRAFT  2019    HX CYST REMOVAL      from back    HX HERNIA REPAIR Bilateral     HX KNEE REPLACEMENT  2017 and 2018    rigjt and left replacement         Medications  Current Outpatient Medications   Medication Sig Dispense Refill    rosuvastatin (CRESTOR) 20 mg tablet TAKE 1 TABLET BY MOUTH AT  NIGHT 90 Tablet 3    colesevelam (WELCHOL) 625 mg tablet TAKE 2 TABS BY MOUTH DAILY. 180 Tab 1    isosorbide mononitrate ER (IMDUR) 30 mg tablet Take 1 Tab by mouth daily. 90 Tab 3    lisinopriL (PRINIVIL, ZESTRIL) 5 mg tablet Take 5 mg by mouth daily.  latanoprost (XALATAN) 0.005 % ophthalmic solution Administer 1 Drop to both eyes nightly.  multivitamin, tx-iron-ca-min (THERA-M w/ IRON) 9 mg iron-400 mcg tab tablet Take 1 Tab by mouth daily.  acetaminophen (Tylenol Extra Strength) 500 mg tablet Take 500 mg by mouth every six (6) hours as needed for Pain.       metFORMIN ER (GLUCOPHAGE XR) 500 mg tablet TAKE 1 TABLET BY MOUTH  TWICE DAILY AFTER MEALS 180 Tab 3    lancets misc Check blood glucose daily. Patient would like One Touch Delica Plus Lancets 329 Each 3    glucose blood VI test strips (blood glucose test) strip Check blood glucose  daily. Patient would like One Touch Yashira test strips 100 Strip 3    metoprolol succinate (TOPROL-XL) 100 mg tablet Take 100 mg by mouth nightly.  aspirin delayed-release 81 mg tablet Take 81 mg by mouth daily.  co-enzyme Q-10 (CO Q-10) 100 mg capsule Take 100 mg by mouth daily. Allergies  Allergies   Allergen Reactions    Atorvastatin Other (comments)     Muscle weakness    Heparin Other (comments)     thrombocytopenia    Heparin Analogues Other (comments)     thrombocytopenia    Medrol [Methylprednisolone] Other (comments)     Muscle weakness    Statins-Hmg-Coa Reductase Inhibitors Other (comments)    Zetia [Ezetimibe] Other (comments)     Muscle joint pain    Zocor [Simvastatin] Other (comments)     Muscle joint pain       Family History  Family History   Problem Relation Age of Onset    Heart Disease Sister     Heart Attack Brother     Cancer Brother     Anemia Brother        Social History  Social History     Socioeconomic History    Marital status:      Spouse name: Not on file    Number of children: Not on file    Years of education: Not on file    Highest education level: Not on file   Occupational History    Not on file   Tobacco Use    Smoking status: Former Smoker    Smokeless tobacco: Never Used   Vaping Use    Vaping Use: Never used   Substance and Sexual Activity    Alcohol use:  Yes     Alcohol/week: 1.0 standard drinks     Types: 1 Shots of liquor per week     Comment: social    Drug use: Never    Sexual activity: Yes     Partners: Female   Other Topics Concern    Not on file   Social History Narrative    Not on file     Social Determinants of Health     Financial Resource Strain:     Difficulty of Paying Living Expenses:    Food Insecurity:     Worried About Running Out of Food in the Last Year:     Ran Out of Food in the Last Year:    Transportation Needs:     Lack of Transportation (Medical):  Lack of Transportation (Non-Medical):    Physical Activity:     Days of Exercise per Week:     Minutes of Exercise per Session:    Stress:     Feeling of Stress :    Social Connections:     Frequency of Communication with Friends and Family:     Frequency of Social Gatherings with Friends and Family:     Attends Yazidi Services:     Active Member of Clubs or Organizations:     Attends Club or Organization Meetings:     Marital Status:    Intimate Partner Violence:     Fear of Current or Ex-Partner:     Emotionally Abused:     Physically Abused:     Sexually Abused:        Review of Systems  Review of Systems - Review of all systems is negative except as noted above in the HPI.     Vital Signs  Visit Vitals  /82 (BP 1 Location: Left upper arm, BP Patient Position: Sitting, BP Cuff Size: Adult)   Pulse 76   Temp 97.5 °F (36.4 °C) (Oral)   Resp 18   Ht 5' 9\" (1.753 m)   Wt 230 lb (104.3 kg)   SpO2 97%   BMI 33.97 kg/m²         Physical Exam  Physical Examination: General appearance - alert, well appearing, and in no distress, oriented to person, place, and time, overweight and acyanotic, in no respiratory distress  Mental status - alert, oriented to person, place, and time, normal mood, behavior, speech, dress, motor activity, and thought processes  Neck - supple, no significant adenopathy  Lymphatics - no palpable lymphadenopathy, no hepatosplenomegaly  Chest - clear to auscultation, no wheezes, rales or rhonchi, symmetric air entry, no tachypnea, retractions or cyanosis  Heart - S1 and S2 normal  Abdomen - soft, nontender, nondistended, no masses or organomegaly  Back exam - limited range of motion  Neurological - abnormal neurological exam unchanged from prior examinations  Musculoskeletal - osteoarthritic changes noted in both hands  Extremities - no pedal edema noted, intact peripheral pulses      Results  Results for orders placed or performed during the hospital encounter of 10/02/21   CREATININE, POC   Result Value Ref Range    Creatinine, POC 0.9 0.6 - 1.3 MG/DL    GFRAA, POC >60 >60 ml/min/1.73m2    GFRNA, POC >60 >60 ml/min/1.73m2       ASSESSMENT and PLAN    ICD-10-CM ICD-9-CM    1. SOB (shortness of breath)  R06.02 786.05 REFERRAL TO PULMONARY DISEASE   2. Coronary artery disease of bypass graft of native heart with stable angina pectoris (HCC)  I25.708 414.05      413.9    3. Type 2 diabetes mellitus with hyperglycemia, without long-term current use of insulin (HCC)  E11.65 250.00      790.29    4. Dyslipidemia  E78.5 272.4    5. Essential hypertension  I10 401.9    6. Encounter for immunization  Z23 V03.89 FLU (FLUAD QUAD INFLUENZA VACCINE,QUAD,ADJUVANTED)      ADMIN INFLUENZA VIRUS VAC   7. Lower urinary tract symptoms (LUTS)  R39.9 788.99 PSA SCREENING (SCREENING)      URINALYSIS W/MICROSCOPIC   8. Screening for malignant neoplasm of prostate  Z12.5 V76.44 PSA SCREENING (SCREENING)      URINALYSIS W/MICROSCOPIC   9. Hepatic steatosis  K76.0 571.8      lab results and schedule of future lab studies reviewed with patient  reviewed diet, exercise and weight control  cardiovascular risk and specific lipid/LDL goals reviewed  reviewed medications and side effects in detail  specific diabetic recommendations: low cholesterol diet, weight control and daily exercise discussed, home glucose monitoring emphasized, all medications, side effects and compliance discussed carefully, annual eye examinations at Ophthalmology discussed, glycohemoglobin and other lab monitoring discussed and long term diabetic complications discussed  use of aspirin to prevent MI and TIA's discussed  radiology results and schedule of future radiology studies reviewed with patient  I spent 40 minutes with this established patient.     I have discussed the diagnosis with the patient and the intended plan of care as seen in the above orders. The patient has received an after-visit summary and questions were answered concerning future plans. I have discussed medication, side effects, and warnings with the patient in detail. The patient verbalized understanding and is in agreement with the plan of care. The patient will contact the office with any additional concerns. Janene Kwon MD    PLEASE NOTE:   This document has been produced using voice recognition software.  Unrecognized errors in transcription may be present

## 2021-10-21 ENCOUNTER — HOSPITAL ENCOUNTER (OUTPATIENT)
Dept: LAB | Age: 66
Discharge: HOME | End: 2021-10-21
Payer: MEDICARE

## 2021-10-21 ENCOUNTER — APPOINTMENT (OUTPATIENT)
Dept: FAMILY MEDICINE CLINIC | Age: 66
End: 2021-10-21

## 2021-10-21 DIAGNOSIS — R39.9 LOWER URINARY TRACT SYMPTOMS (LUTS): ICD-10-CM

## 2021-10-21 DIAGNOSIS — Z12.5 SCREENING FOR MALIGNANT NEOPLASM OF PROSTATE: ICD-10-CM

## 2021-10-21 LAB
APPEARANCE UR: CLEAR
BILIRUB UR QL: NEGATIVE
COLOR UR: YELLOW
GLUCOSE UR STRIP.AUTO-MCNC: NEGATIVE MG/DL
HGB UR QL STRIP: NEGATIVE
KETONES UR QL STRIP.AUTO: NEGATIVE MG/DL
LEUKOCYTE ESTERASE UR QL STRIP.AUTO: NEGATIVE
NITRITE UR QL STRIP.AUTO: NEGATIVE
PH UR STRIP: 6 [PH] (ref 5–8)
PROT UR STRIP-MCNC: NEGATIVE MG/DL
PSA SERPL-MCNC: 2.1 NG/ML (ref 0–4)
SP GR UR REFRACTOMETRY: 1.01 (ref 1–1.03)
UROBILINOGEN UR QL STRIP.AUTO: 0.2 EU/DL (ref 0.2–1)

## 2021-10-21 PROCEDURE — 36415 COLL VENOUS BLD VENIPUNCTURE: CPT

## 2021-10-21 PROCEDURE — 81003 URINALYSIS AUTO W/O SCOPE: CPT

## 2021-10-21 PROCEDURE — 84153 ASSAY OF PSA TOTAL: CPT

## 2021-10-26 RX ORDER — COLESEVELAM 180 1/1
TABLET ORAL
Qty: 180 TABLET | Refills: 1 | Status: SHIPPED | OUTPATIENT
Start: 2021-10-26 | End: 2022-04-27

## 2021-11-30 DIAGNOSIS — E11.65 TYPE 2 DIABETES MELLITUS WITH HYPERGLYCEMIA, WITHOUT LONG-TERM CURRENT USE OF INSULIN (HCC): ICD-10-CM

## 2021-12-02 RX ORDER — METFORMIN HYDROCHLORIDE 500 MG/1
500 TABLET, EXTENDED RELEASE ORAL
Qty: 180 TABLET | Refills: 3 | Status: SHIPPED | OUTPATIENT
Start: 2021-12-02 | End: 2021-12-13 | Stop reason: SDUPTHER

## 2021-12-02 NOTE — TELEPHONE ENCOUNTER
Requested Prescriptions     Pending Prescriptions Disp Refills    metFORMIN ER (GLUCOPHAGE XR) 500 mg tablet 180 Tablet 3     Jose C Sarmiento called for their medication refill.     Last Office visit:  10-  Last labs:  07-  Follow up visit:  12-  Last date prescribe 09-    Please Advise

## 2021-12-10 ENCOUNTER — TELEPHONE (OUTPATIENT)
Dept: FAMILY MEDICINE CLINIC | Age: 66
End: 2021-12-10

## 2021-12-10 NOTE — TELEPHONE ENCOUNTER
Pt stated [facility] contacted his wife and informed her that his metformin was cancelled. He would like to speak to Dr. Harsh Evans please advise.  Thank you!!!

## 2021-12-13 DIAGNOSIS — E11.65 TYPE 2 DIABETES MELLITUS WITH HYPERGLYCEMIA, WITHOUT LONG-TERM CURRENT USE OF INSULIN (HCC): ICD-10-CM

## 2021-12-13 RX ORDER — METFORMIN HYDROCHLORIDE 500 MG/1
TABLET, EXTENDED RELEASE ORAL
Qty: 180 TABLET | Refills: 3 | Status: SHIPPED | OUTPATIENT
Start: 2021-12-13 | End: 2022-09-12

## 2021-12-13 NOTE — TELEPHONE ENCOUNTER
I have called and spoke to patient. He takes Metformin twice a day with meals. Prescription has been sent in.   Bobby Dominguez MD

## 2021-12-21 ENCOUNTER — OFFICE VISIT (OUTPATIENT)
Dept: FAMILY MEDICINE CLINIC | Age: 66
End: 2021-12-21
Payer: MEDICARE

## 2021-12-21 VITALS
DIASTOLIC BLOOD PRESSURE: 65 MMHG | BODY MASS INDEX: 34.21 KG/M2 | HEART RATE: 71 BPM | HEIGHT: 69 IN | SYSTOLIC BLOOD PRESSURE: 107 MMHG | TEMPERATURE: 98 F | RESPIRATION RATE: 20 BRPM | WEIGHT: 231 LBS | OXYGEN SATURATION: 92 %

## 2021-12-21 DIAGNOSIS — M79.10 MYALGIA: ICD-10-CM

## 2021-12-21 DIAGNOSIS — Z00.00 MEDICARE ANNUAL WELLNESS VISIT, SUBSEQUENT: Primary | ICD-10-CM

## 2021-12-21 DIAGNOSIS — E78.5 DYSLIPIDEMIA: ICD-10-CM

## 2021-12-21 DIAGNOSIS — E11.65 TYPE 2 DIABETES MELLITUS WITH HYPERGLYCEMIA, WITHOUT LONG-TERM CURRENT USE OF INSULIN (HCC): ICD-10-CM

## 2021-12-21 DIAGNOSIS — Z13.31 SCREENING FOR DEPRESSION: ICD-10-CM

## 2021-12-21 DIAGNOSIS — Z71.89 ADVANCED DIRECTIVES, COUNSELING/DISCUSSION: ICD-10-CM

## 2021-12-21 DIAGNOSIS — Z23 ENCOUNTER FOR IMMUNIZATION: ICD-10-CM

## 2021-12-21 DIAGNOSIS — I10 ESSENTIAL HYPERTENSION: ICD-10-CM

## 2021-12-21 PROBLEM — I48.0 PAROXYSMAL ATRIAL FIBRILLATION (HCC): Status: ACTIVE | Noted: 2020-03-02

## 2021-12-21 PROBLEM — D75.829 HEPARIN INDUCED THROMBOCYTOPENIA: Status: ACTIVE | Noted: 2020-01-30

## 2021-12-21 PROBLEM — I25.10 ATHEROSCLEROSIS OF CORONARY ARTERY: Status: ACTIVE | Noted: 2019-12-06

## 2021-12-21 PROBLEM — R07.89 ATYPICAL CHEST PAIN: Status: ACTIVE | Noted: 2020-03-13

## 2021-12-21 PROBLEM — Z95.1 S/P CABG X 4: Status: ACTIVE | Noted: 2021-12-21

## 2021-12-21 PROCEDURE — G8752 SYS BP LESS 140: HCPCS | Performed by: FAMILY MEDICINE

## 2021-12-21 PROCEDURE — G0439 PPPS, SUBSEQ VISIT: HCPCS | Performed by: FAMILY MEDICINE

## 2021-12-21 PROCEDURE — 2022F DILAT RTA XM EVC RTNOPTHY: CPT | Performed by: FAMILY MEDICINE

## 2021-12-21 PROCEDURE — G8510 SCR DEP NEG, NO PLAN REQD: HCPCS | Performed by: FAMILY MEDICINE

## 2021-12-21 PROCEDURE — G8754 DIAS BP LESS 90: HCPCS | Performed by: FAMILY MEDICINE

## 2021-12-21 PROCEDURE — G8427 DOCREV CUR MEDS BY ELIG CLIN: HCPCS | Performed by: FAMILY MEDICINE

## 2021-12-21 PROCEDURE — 1101F PT FALLS ASSESS-DOCD LE1/YR: CPT | Performed by: FAMILY MEDICINE

## 2021-12-21 PROCEDURE — G8417 CALC BMI ABV UP PARAM F/U: HCPCS | Performed by: FAMILY MEDICINE

## 2021-12-21 PROCEDURE — G0444 DEPRESSION SCREEN ANNUAL: HCPCS | Performed by: FAMILY MEDICINE

## 2021-12-21 PROCEDURE — G8536 NO DOC ELDER MAL SCRN: HCPCS | Performed by: FAMILY MEDICINE

## 2021-12-21 PROCEDURE — 90715 TDAP VACCINE 7 YRS/> IM: CPT | Performed by: FAMILY MEDICINE

## 2021-12-21 PROCEDURE — 3017F COLORECTAL CA SCREEN DOC REV: CPT | Performed by: FAMILY MEDICINE

## 2021-12-21 PROCEDURE — 3044F HG A1C LEVEL LT 7.0%: CPT | Performed by: FAMILY MEDICINE

## 2021-12-21 NOTE — ACP (ADVANCE CARE PLANNING)
Advance Care Planning     Advance Care Planning (ACP) Physician/NP/PA Conversation      Date of Conversation: 12/21/2021  Conducted with: Patient with Decision Making Capacity    Healthcare Decision Maker:     Primary Decision Maker (Active): madelyn bardales  - Spouse - [phone]  Click here to complete [address] including selection of the Healthcare Decision Maker Relationship (ie \"Primary\")      Today we documented Decision Maker(s) consistent with ACP documents on file.     Conversation Outcomes / Follow-Up Plan:   ACP complete - no further action today    Length of Voluntary ACP Conversation in minutes:  <16 minutes (Non-Billable)    Malena Brenner MD

## 2021-12-21 NOTE — PATIENT INSTRUCTIONS
Medicare Wellness Visit, Male    The best way to live healthy is to have a lifestyle where you eat a well-balanced diet, exercise regularly, limit alcohol use, and quit all forms of tobacco/nicotine, if applicable. Regular preventive services are another way to keep healthy. Preventive services (vaccines, screening tests, monitoring & exams) can help personalize your care plan, which helps you manage your own care. Screening tests can find health problems at the earliest stages, when they are easiest to treat. [facility] follows the current, evidence-based guidelines published by the [facility] ([facility]) when recommending preventive services for our patients. Because we follow these guidelines, sometimes recommendations change over time as research supports it. (For example, a prostate screening blood test is no longer routinely recommended for men with no symptoms). Of course, you and your doctor may decide to screen more often for some diseases, based on your risk and co-morbidities (chronic disease you are already diagnosed with). Preventive services for you include:  - Medicare offers their members a free annual wellness visit, which is time for you and your primary care provider to discuss and plan for your preventive service needs. Take advantage of this benefit every year!  -All adults over age 72 should receive the recommended pneumonia vaccines. Current USPSTF guidelines recommend a series of two vaccines for the best pneumonia protection.   -All adults should have a flu vaccine yearly and tetanus vaccine every 10 years.  -All adults age 48 and older should receive the shingles vaccines (series of two vaccines).        -All adults age 38-68 who are overweight should have a diabetes screening test once every three years.   -Other screening tests & preventive services for persons with diabetes include: an eye exam to screen for diabetic retinopathy, a kidney function test, a foot exam, and stricter control over your cholesterol.   -Cardiovascular screening for adults with routine risk involves an electrocardiogram (ECG) at intervals determined by the provider.   -Colorectal cancer screening should be done for adults age 54-65 with no increased risk factors for colorectal cancer. There are a number of acceptable methods of screening for this type of cancer. Each test has its own benefits and drawbacks. Discuss with your provider what is most appropriate for you during your annual wellness visit. The different tests include: colonoscopy (considered the best screening method), a fecal occult blood test, a fecal DNA test, and sigmoidoscopy.  -All adults born between [facility] should be screened once for Hepatitis C.  -An Abdominal Aortic Aneurysm (AAA) Screening is recommended for men age 73-68 who has ever smoked in their lifetime. Here is a list of your current Health Maintenance items (your personalized list of preventive services) with a due date:  Health Maintenance Due   Topic Date Due    Eye Exam  Never done    DTaP/Tdap/Td  (1 - Tdap) Never done    Annual Well Visit  12/16/2021     Medicare Wellness Visit, Male    The best way to live healthy is to have a lifestyle where you eat a well-balanced diet, exercise regularly, limit alcohol use, and quit all forms of tobacco/nicotine, if applicable. Regular preventive services are another way to keep healthy. Preventive services (vaccines, screening tests, monitoring & exams) can help personalize your care plan, which helps you manage your own care. Screening tests can find health problems at the earliest stages, when they are easiest to treat. [facility] follows the current, evidence-based guidelines published by the [place] [facility] [place] (USPSTF) when recommending preventive services for our patients.  Because we follow these guidelines, sometimes recommendations change over time as research supports it. (For example, a prostate screening blood test is no longer routinely recommended for men with no symptoms). Of course, you and your doctor may decide to screen more often for some diseases, based on your risk and co-morbidities (chronic disease you are already diagnosed with). Preventive services for you include:  - Medicare offers their members a free annual wellness visit, which is time for you and your primary care provider to discuss and plan for your preventive service needs. Take advantage of this benefit every year!  -All adults over age 72 should receive the recommended pneumonia vaccines. Current USPSTF guidelines recommend a series of two vaccines for the best pneumonia protection.   -All adults should have a flu vaccine yearly and tetanus vaccine every 10 years.  -All adults age 48 and older should receive the shingles vaccines (series of two vaccines). -All adults age 38-68 who are overweight should have a diabetes screening test once every three years.   -Other screening tests & preventive services for persons with diabetes include: an eye exam to screen for diabetic retinopathy, a kidney function test, a foot exam, and stricter control over your cholesterol.   -Cardiovascular screening for adults with routine risk involves an electrocardiogram (ECG) at intervals determined by the provider.   -Colorectal cancer screening should be done for adults age 54-65 with no increased risk factors for colorectal cancer. There are a number of acceptable methods of screening for this type of cancer. Each test has its own benefits and drawbacks. Discuss with your provider what is most appropriate for you during your annual wellness visit.  The different tests include: colonoscopy (considered the best screening method), a fecal occult blood test, a fecal DNA test, and sigmoidoscopy.  -All adults born between [facility] should be screened once for Hepatitis C.  -An Abdominal Aortic Aneurysm (AAA) Screening is recommended for men age 73-68 who has ever smoked in their lifetime.      Here is a list of your current Health Maintenance items (your personalized list of preventive services) with a due date:  Health Maintenance Due   Topic Date Due    Eye Exam  Never done

## 2021-12-21 NOTE — PROGRESS NOTES
This is the Subsequent Medicare Annual Wellness Exam, performed 12 months or more after the Initial AWV or the last Subsequent AWV    I have reviewed the patient's medical history in detail and updated the computerized patient record. Chief Complaint   Patient presents with    Annual Wellness Visit    Discuss Medications     think statin meication is causing arm and hand pain with muscle weakness     1. \"Have you been to the ER, urgent care clinic since your last visit? Hospitalized since your last visit? \" No    2. \"Have you seen or consulted any other health care providers outside of the [address] [place] [address] since your last visit? \" No     3. For patients aged 39-70: Has the patient had a colonoscopy / FIT/ Cologuard? Yes, HM satisfied with blue hyperlink     If the patient is female:    4. For patients aged 41-77: Has the patient had a mammogram within the past 2 years? NA based on age or sex    11. For patients aged 21-65: Has the patient had a pap smear? NA based on age or sex        Assessment/Plan   Education and counseling provided:  Are appropriate based on today's review and evaluation         ICD-10-CM ICD-9-CM    1. Medicare annual wellness visit, subsequent  Z00.00 V70.0    2. Type 2 diabetes mellitus with hyperglycemia, without long-term current use of insulin (HCC)  Q31.26 187.01 METABOLIC PANEL, COMPREHENSIVE     790.29 CBC WITH AUTOMATED DIFF      HEMOGLOBIN A1C WITH EAG   3. Dyslipidemia  E78.5 272.4 LIPID PANEL   4. Essential hypertension  I10 401.9    5. Myalgia  M79.10 729.1 CK   6. Encounter for immunization  Z23 V03.89 TETANUS, DIPHTHERIA TOXOIDS AND ACELLULAR PERTUSSIS VACCINE (TDAP), IN INDIVIDS. >=7, IM   7. Advanced directives, counseling/discussion  Z71.89 V65.49    8.  Screening for depression  Z13.31 V79.0 DEPRESSION SCREEN ANNUAL      AR DEPRESSION SCREEN ANNUAL          Depression Risk Factor Screening     3 most recent PHQ Screens 12/21/2021   PHQ Not Done -   Little interest or pleasure in doing things Not at all   Feeling down, depressed, irritable, or hopeless Not at all   Total Score PHQ 2 0   Trouble falling or staying asleep, or sleeping too much Not at all   Feeling tired or having little energy More than half the days   Poor appetite, weight loss, or overeating Not at all   Feeling bad about yourself - or that you are a failure or have let yourself or your family down Not at all   Trouble concentrating on things such as school, work, reading, or watching TV Not at all   Moving or speaking so slowly that other people could have noticed; or the opposite being so fidgety that others notice Not at all   Thoughts of being better off dead, or hurting yourself in some way Not at all   PHQ 9 Score 2   How difficult have these problems made it for you to do your work, take care of your home and get along with others Not difficult at all   8 minutes taken on depression screening. Alcohol & Drug Abuse Risk Screen    Do you average more than 1 drink per night or more than 7 drinks a week: No    In the past three months have you have had more than 4 drinks containing alcohol on one occasion: No          Functional Ability and Level of Safety     1. Was the patient's timed Up and GO test unsteady or longer than 30 seconds? No  2. Does the patient need help with the phone, transportation, shopping, preparing meals, housework, laundry, medications or managing money? No  3. Does the patients' home have rugs in the hallway, lack grab bars in the bathroom, lack handrails on the stairs or have poor lighting? No  4. Have you noticed any hearing difficulties? No  Hearing Evaluation:       Hearing: Hearing is good. Activities of Daily Living: The home contains: no safety equipment. Patient does total self care      Ambulation: with no difficulty     Fall Risk:  Fall Risk Assessment, last 12 mths 12/21/2021   Able to walk? Yes   Fall in past 12 months? 0   Do you feel unsteady?  0   Are you worried about falling 0      Abuse Screen:  Patient is not abused       Cognitive Screening    Has your family/caregiver stated any concerns about your memory: no     Cognitive Screening: Normal - Verbal Fluency Test    Health Maintenance Due     Health Maintenance Due   Topic Date Due    Eye Exam Retinal or Dilated  Never done    DTaP/Tdap/Td series (1 - Tdap) Never done    Medicare Yearly Exam  12/16/2021       Patient Care Team   Patient Care Team:  Kylie Jain MD as PCP - General (Family Medicine)  Kylie Jain MD as PCP [facility]  Ishan Del Angel DO (Cardiology)  Reilly Hearn MD (Ophthalmology)  Azucena Rosales MD as Surgeon (Colon and Rectal Surgery)    History   Keerthi Hernandez comes in for medicare wellness exam.  CAD: Patient has coronary artery disease. He is on lisinopril, Imdur, Crestor, WelChol, Toprol-XL and aspirin. Stable on medication. Followed up with a cardiologist.  Continue current treatment plan. We will recheck labs. Dyslipidemia: Patient has a history of dyslipidemia. He is on Crestor. States that he is having muscle aches. Has had this problem with Crestor in the past.  Currently takes 20 mg daily. Lipid panel is stable. He will cut back to 10 mg of the Crestor. We will recheck labs at next visit. He is taking coenzyme Q 10. Hypertension: Patient has hypertension. Blood pressure is stable. He is on lisinopril, Imdur and Toprol-XL. We will continue current treatment plan. Denies headache, changes in vision or focal weakness. Obesity: Patient has a BMI of 34.11. He will intensify lifestyle and dietary modification. Health maintenance: Patient is active and has been getting cuts on his hands when he moves objects. He would benefit from getting a tetanus vaccine. This will be given today. Currently has laceration that is not actively bleeding but has broken the skin on his right hand.       Patient Active Problem List   Diagnosis Code    Gastroesophageal reflux disease K21.9    Coronary artery disease with stable angina pectoris ([facility] [address] I25.118     Past Medical History:   Diagnosis Date    Bypass graft mechanical complication (HCC)     quadropple    Diabetes ([facility] [address]     Glaucoma     History of staph infection     knee    HTN (hypertension)     Hyperlipemia     SOB (shortness of breath)       Past Surgical History:   Procedure Laterality Date    COLONOSCOPY N/A 3/26/2021    COLONOSCOPY performed by Donavan Llanes MD at [address] HX COLONOSCOPY  2010    normal    HX CORONARY ARTERY BYPASS GRAFT  2019    HX CYST REMOVAL      from back    HX HERNIA REPAIR Bilateral     HX KNEE REPLACEMENT  2017 and 2018    rigjt and left replacement      Current Outpatient Medications   Medication Sig Dispense Refill    metFORMIN ER (GLUCOPHAGE XR) 500 mg tablet TAKE 1 TABLET BY MOUTH  TWICE DAILY WITH MEALS 180 Tablet 3    colesevelam (WELCHOL) 625 mg tablet TAKE 2 TABS BY MOUTH DAILY. 180 Tablet 1    rosuvastatin (CRESTOR) 20 mg tablet TAKE 1 TABLET BY MOUTH AT  NIGHT 90 Tablet 3    isosorbide mononitrate ER (IMDUR) 30 mg tablet Take 1 Tab by mouth daily. 90 Tab 3    lisinopriL (PRINIVIL, ZESTRIL) 5 mg tablet Take 5 mg by mouth daily.  latanoprost (XALATAN) 0.005 % ophthalmic solution Administer 1 Drop to both eyes nightly.  multivitamin, tx-iron-ca-min (THERA-M w/ IRON) 9 mg iron-400 mcg tab tablet Take 1 Tab by mouth daily.  acetaminophen (Tylenol Extra Strength) 500 mg tablet Take 500 mg by mouth every six (6) hours as needed for Pain.  lancets misc Check blood glucose  daily. Patient would like One Touch Delica Plus Lancets 469 Each 3    glucose blood VI test strips (blood glucose test) strip Check blood glucose  daily. Patient would like One Touch Yashira test strips 100 Strip 3    metoprolol succinate (TOPROL-XL) 100 mg tablet Take 100 mg by mouth nightly.       aspirin delayed-release 81 mg tablet Take 81 mg by mouth daily.  co-enzyme Q-10 (CO Q-10) 100 mg capsule Take 100 mg by mouth daily. Allergies   Allergen Reactions    Atorvastatin Other (comments)     Muscle weakness    Heparin Other (comments)     thrombocytopenia    Heparin Analogues Other (comments)     thrombocytopenia    Medrol [Methylprednisolone] Other (comments)     Muscle weakness    Statins-Hmg-Coa Reductase Inhibitors Other (comments)    Zetia [Ezetimibe] Other (comments)     Muscle joint pain    Zocor [Simvastatin] Other (comments)     Muscle joint pain       Family History   Problem Relation Age of Onset    Heart Disease Sister     Heart Attack Brother     Cancer Brother     Anemia Brother      Social History     Tobacco Use    Smoking status: Former Smoker    Smokeless tobacco: Never Used   Substance Use Topics    Alcohol use: Yes     Alcohol/week: 1.0 standard drink     Types: 1 Shots of liquor per week     Comment: social     Review of Systems   A comprehensive review of systems was negative except for that written in the HPI. Physical Examination     Visit Vitals  /65 (BP 1 Location: Left upper arm, BP Patient Position: Sitting, BP Cuff Size: Adult)   Pulse 71   Temp 98 °F (36.7 °C) (Oral)   Resp 20   Ht 5' 9\" (1.753 m)   Wt 231 lb (104.8 kg)   SpO2 92%   BMI 34.11 kg/m²     General appearance: alert, cooperative, no distress, appears stated age  Head: Normocephalic, without obvious abnormality, atraumatic  Neck: supple, symmetrical, trachea midline and no adenopathy  Back: Limited range of motion  Lungs: diminished breath sounds bilateral lung bases  Chest wall: no tenderness  Heart: systolic murmur: early systolic 2/6,  at lower left sternal border  Abdomen: soft, non-tender.  Bowel sounds normal. No masses,  no organomegaly  Extremities: no edema  Pulses: 2+ and symmetric  Neurologic: Grossly normal      I have discussed the diagnosis with the patient and the intended plan of care as seen in the above orders. The patient has received an after-visit summary and questions were answered concerning future plans. I have discussed medication, side effects, and warnings with the patient in detail. The patient verbalized understanding and is in agreement with the plan of care. The patient will contact the office with any additional concerns. Personalized preventative plan of care was discussed, printed and given to patient.     Bobby Dominguez MD

## 2021-12-21 NOTE — PROGRESS NOTES
After obtaining consent, and per orders of Dr. Doretha Fernandes, injection of tetanus given by Leticia Lindsay. Patient instructed to remain in clinic for 20 minutes afterwards, and to report any adverse reaction to me immediately.

## 2021-12-23 ENCOUNTER — HOSPITAL ENCOUNTER (OUTPATIENT)
Dept: LAB | Age: 66
Discharge: HOME | End: 2021-12-23
Payer: MEDICARE

## 2021-12-23 ENCOUNTER — LAB ONLY (OUTPATIENT)
Dept: FAMILY MEDICINE CLINIC | Age: 66
End: 2021-12-23
Payer: MEDICARE

## 2021-12-23 DIAGNOSIS — M79.10 MYALGIA: ICD-10-CM

## 2021-12-23 DIAGNOSIS — Z01.89 ENCOUNTER FOR LABORATORY TEST: Primary | ICD-10-CM

## 2021-12-23 DIAGNOSIS — E11.65 TYPE 2 DIABETES MELLITUS WITH HYPERGLYCEMIA, WITHOUT LONG-TERM CURRENT USE OF INSULIN (HCC): ICD-10-CM

## 2021-12-23 DIAGNOSIS — E78.5 DYSLIPIDEMIA: ICD-10-CM

## 2021-12-23 PROBLEM — D75.829 HEPARIN INDUCED THROMBOCYTOPENIA: Status: RESOLVED | Noted: 2020-01-30 | Resolved: 2021-12-23

## 2021-12-23 LAB
ALBUMIN SERPL-MCNC: 3.7 G/DL (ref 3.4–5)
ALBUMIN/GLOB SERPL: 1.1 {RATIO} (ref 0.8–1.7)
ALP SERPL-CCNC: 68 U/L (ref 45–117)
ALT SERPL-CCNC: 41 U/L (ref 16–61)
ANION GAP SERPL CALC-SCNC: 6 MMOL/L (ref 3–18)
AST SERPL-CCNC: 19 U/L (ref 10–38)
BASOPHILS # BLD: 0 K/UL (ref 0–0.1)
BASOPHILS NFR BLD: 1 % (ref 0–2)
BILIRUB SERPL-MCNC: 0.4 MG/DL (ref 0.2–1)
BUN SERPL-MCNC: 14 MG/DL (ref 7–18)
BUN/CREAT SERPL: 16 (ref 12–20)
CALCIUM SERPL-MCNC: 9.1 MG/DL (ref 8.5–10.1)
CHLORIDE SERPL-SCNC: 104 MMOL/L (ref 100–111)
CHOLEST SERPL-MCNC: 134 MG/DL
CK SERPL-CCNC: 235 U/L (ref 39–308)
CO2 SERPL-SCNC: 26 MMOL/L (ref 21–32)
CREAT SERPL-MCNC: 0.88 MG/DL (ref 0.6–1.3)
DIFFERENTIAL METHOD BLD: ABNORMAL
EOSINOPHIL # BLD: 0.1 K/UL (ref 0–0.4)
EOSINOPHIL NFR BLD: 2 % (ref 0–5)
ERYTHROCYTE [DISTWIDTH] IN BLOOD BY AUTOMATED COUNT: 12 % (ref 11.6–14.5)
EST. AVERAGE GLUCOSE BLD GHB EST-MCNC: 160 MG/DL
GLOBULIN SER CALC-MCNC: 3.3 G/DL (ref 2–4)
GLUCOSE SERPL-MCNC: 154 MG/DL (ref 74–99)
HBA1C MFR BLD: 7.2 % (ref 4.2–5.6)
HCT VFR BLD AUTO: 43.5 % (ref 36–48)
HDLC SERPL-MCNC: 41 MG/DL (ref 40–60)
HDLC SERPL: 3.3 {RATIO} (ref 0–5)
HGB BLD-MCNC: 14.3 G/DL (ref 13–16)
IMM GRANULOCYTES # BLD AUTO: 0 K/UL (ref 0–0.04)
IMM GRANULOCYTES NFR BLD AUTO: 0 % (ref 0–0.5)
LDLC SERPL CALC-MCNC: 61.6 MG/DL (ref 0–100)
LIPID PROFILE,FLP: ABNORMAL
LYMPHOCYTES # BLD: 1.4 K/UL (ref 0.9–3.6)
LYMPHOCYTES NFR BLD: 25 % (ref 21–52)
MCH RBC QN AUTO: 31.8 PG (ref 24–34)
MCHC RBC AUTO-ENTMCNC: 32.9 G/DL (ref 31–37)
MCV RBC AUTO: 96.7 FL (ref 78–100)
MONOCYTES # BLD: 0.6 K/UL (ref 0.05–1.2)
MONOCYTES NFR BLD: 11 % (ref 3–10)
NEUTS SEG # BLD: 3.4 K/UL (ref 1.8–8)
NEUTS SEG NFR BLD: 61 % (ref 40–73)
NRBC # BLD: 0 K/UL (ref 0–0.01)
NRBC BLD-RTO: 0 PER 100 WBC
PLATELET # BLD AUTO: 226 K/UL (ref 135–420)
PMV BLD AUTO: 9 FL (ref 9.2–11.8)
POTASSIUM SERPL-SCNC: 4.4 MMOL/L (ref 3.5–5.5)
PROT SERPL-MCNC: 7 G/DL (ref 6.4–8.2)
RBC # BLD AUTO: 4.5 M/UL (ref 4.35–5.65)
SODIUM SERPL-SCNC: 136 MMOL/L (ref 136–145)
TRIGL SERPL-MCNC: 157 MG/DL (ref ?–150)
VLDLC SERPL CALC-MCNC: 31.4 MG/DL
WBC # BLD AUTO: 5.6 K/UL (ref 4.6–13.2)

## 2021-12-23 PROCEDURE — 36415 COLL VENOUS BLD VENIPUNCTURE: CPT | Performed by: FAMILY MEDICINE

## 2021-12-23 PROCEDURE — 85025 COMPLETE CBC W/AUTO DIFF WBC: CPT

## 2021-12-23 PROCEDURE — 80053 COMPREHEN METABOLIC PANEL: CPT

## 2021-12-23 PROCEDURE — 80061 LIPID PANEL: CPT

## 2021-12-23 PROCEDURE — 82550 ASSAY OF CK (CPK): CPT

## 2021-12-23 PROCEDURE — 36415 COLL VENOUS BLD VENIPUNCTURE: CPT

## 2021-12-23 PROCEDURE — 83036 HEMOGLOBIN GLYCOSYLATED A1C: CPT

## 2022-01-05 NOTE — PROGRESS NOTES
Please let patient know HbA1c 7.2. He should intensify lifestyle and dietary modification. He is on the Metformin twice a day. Recheck labs at next visit. If still going up consider medication adjustment. Rest of his labs are reassuring.   Sebastián Bay MD

## 2022-01-13 RX ORDER — ISOSORBIDE MONONITRATE 30 MG/1
TABLET, EXTENDED RELEASE ORAL
Qty: 90 TABLET | Refills: 3 | Status: SHIPPED | OUTPATIENT
Start: 2022-01-13

## 2022-02-01 ENCOUNTER — NON-APPOINTMENT (OUTPATIENT)
Age: 67
End: 2022-02-01

## 2022-02-02 NOTE — PROGRESS NOTES
HISTORY OF PRESENT ILLNESS  Candise Severs is a 78 y.o. male referred for shortness of breath. Pt with complaint of shortness of breath for years but worse over the last 2-3 months, associated with a cough. Both complaints are brought on by exertion eg walking up 1 flight of stairs. SOB also occurs with bending to put on shoes and relieved by rest. He denies wheezing but has occasional sternal pain radiating to both arms, though to be sequela of a complicated 4 vessel CABG in 2019. He was told of a lung nodule on CT in 2019 however follow up CT below is negative. Pt only smoked for 2 years in his teens. Review of Systems   Constitutional: Negative for chills, diaphoresis, fever, malaise/fatigue and weight loss. HENT: Negative for congestion, ear discharge, ear pain, hearing loss, nosebleeds, sinus pain, sore throat and tinnitus. Eyes: Negative for blurred vision, double vision, photophobia, pain, discharge and redness. Respiratory: Positive for cough and shortness of breath. Negative for hemoptysis, sputum production, wheezing and stridor. Cardiovascular: Positive for chest pain. Negative for palpitations, orthopnea, claudication and PND. Leg swelling: occasional.   Gastrointestinal: Negative for abdominal pain, blood in stool, constipation, diarrhea, heartburn, melena, nausea and vomiting. Genitourinary: Negative for dysuria, flank pain, frequency, hematuria and urgency. Musculoskeletal: Negative for back pain, falls, joint pain, myalgias and neck pain. Skin: Negative for itching and rash. Neurological: Negative for dizziness, tingling, tremors, sensory change, speech change, focal weakness, seizures, loss of consciousness, weakness and headaches. Endo/Heme/Allergies: Negative for environmental allergies and polydipsia. Does not bruise/bleed easily. Psychiatric/Behavioral: Negative for depression, hallucinations, memory loss, substance abuse and suicidal ideas.  The patient is not nervous/anxious and does not have insomnia. Past Medical History:   Diagnosis Date    Bypass graft mechanical complication (HCC)     quadropple    Diabetes ([address]     Glaucoma     History of staph infection     knee    HTN (hypertension)     Hyperlipemia     SOB (shortness of breath)      Past Surgical History:   Procedure Laterality Date    COLONOSCOPY N/A 3/26/2021    COLONOSCOPY performed by Kaleigh Tang MD at [address] HX COLONOSCOPY  2010    normal    HX CORONARY ARTERY BYPASS GRAFT  2019    HX CYST REMOVAL      from back    HX HERNIA REPAIR Bilateral     HX KNEE REPLACEMENT  2017 and 2018    rigjt and left replacement      Current Outpatient Medications on File Prior to Visit   Medication Sig Dispense Refill    isosorbide mononitrate ER (IMDUR) 30 mg tablet TAKE 1 TABLET BY MOUTH  DAILY 90 Tablet 3    metFORMIN ER (GLUCOPHAGE XR) 500 mg tablet TAKE 1 TABLET BY MOUTH  TWICE DAILY WITH MEALS 180 Tablet 3    colesevelam (WELCHOL) 625 mg tablet TAKE 2 TABS BY MOUTH DAILY. 180 Tablet 1    lisinopriL (PRINIVIL, ZESTRIL) 5 mg tablet Take 5 mg by mouth daily.  latanoprost (XALATAN) 0.005 % ophthalmic solution Administer 1 Drop to both eyes nightly.  multivitamin, tx-iron-ca-min (THERA-M w/ IRON) 9 mg iron-400 mcg tab tablet Take 1 Tab by mouth daily.  acetaminophen (Tylenol Extra Strength) 500 mg tablet Take 500 mg by mouth every six (6) hours as needed for Pain.  lancets misc Check blood glucose  daily. Patient would like One Touch Delica Plus Lancets 922 Each 3    glucose blood VI test strips (blood glucose test) strip Check blood glucose  daily. Patient would like One Touch Yashira test strips 100 Strip 3    metoprolol succinate (TOPROL-XL) 100 mg tablet Take 100 mg by mouth nightly.  aspirin delayed-release 81 mg tablet Take 81 mg by mouth daily.  co-enzyme Q-10 (CO Q-10) 100 mg capsule Take 100 mg by mouth daily.       rosuvastatin (CRESTOR) 20 mg tablet TAKE 1 TABLET BY MOUTH AT  NIGHT (Patient taking differently: 5 mg.) 90 Tablet 3     No current facility-administered medications on file prior to visit. Allergies   Allergen Reactions    Atorvastatin Other (comments)     Muscle weakness    Heparin Other (comments)     thrombocytopenia    Heparin Analogues Other (comments)     thrombocytopenia    Medrol [Methylprednisolone] Other (comments)     Muscle weakness    Statins-Hmg-Coa Reductase Inhibitors Other (comments)    Zetia [Ezetimibe] Other (comments)     Muscle joint pain    Zocor [Simvastatin] Other (comments)     Muscle joint pain     Family History   Problem Relation Age of Onset    Heart Attack Father     Heart Disease Sister     Heart Attack Brother     Leukemia Brother     Anemia Brother      Social History     Socioeconomic History    Marital status:      Spouse name: Not on file    Number of children: Not on file    Years of education: Not on file    Highest education level: Not on file   Occupational History    Not on file   Tobacco Use    Smoking status: Former Smoker     Packs/day: 0.50     Years: 2.00     Pack years: 1.00     Quit date:      Years since quittin.1    Smokeless tobacco: Never Used   Vaping Use    Vaping Use: Never used   Substance and Sexual Activity    Alcohol use: Yes     Alcohol/week: 1.0 standard drink     Types: 1 Shots of liquor per week     Comment: social    Drug use: Never    Sexual activity: Yes     Partners: Female   Other Topics Concern    Not on file   Social History Narrative    Not on file     Social Determinants of Health     Financial Resource Strain:     Difficulty of Paying Living Expenses: Not on file   Food Insecurity:     Worried About Running Out of Food in the Last Year: Not on file    Yuval of Food in the Last Year: Not on file   Transportation Needs:     Lack of Transportation (Medical): Not on file    Lack of Transportation (Non-Medical):  Not on file Physical Activity:     Days of Exercise per Week: Not on file    Minutes of Exercise per Session: Not on file   Stress:     Feeling of Stress : Not on file   Social Connections:     Frequency of Communication with Friends and Family: Not on file    Frequency of Social Gatherings with Friends and Family: Not on file    Attends Restorationism Services: Not on file    Active Member of [employer] or Organizations: Not on file    Attends Club or Organization Meetings: Not on file    Marital Status: Not on file   Intimate Partner Violence:     Fear of Current or Ex-Partner: Not on file    Emotionally Abused: Not on file    Physically Abused: Not on file    Sexually Abused: Not on file   Housing Stability:     Unable to Pay for Housing in the Last Year: Not on file    Number of [place] in the Last Year: Not on file    Unstable Housing in the Last Year: Not on file     Blood pressure 135/89, pulse 85, temperature 98.1 °F (36.7 °C), temperature source Temporal, resp. rate 20, height 5' 9\" (1.753 m), weight 105.1 kg (231 lb 9.6 oz), SpO2 96 %. Physical Exam  Constitutional:       General: He is not in acute distress. Appearance: He is obese. He is not ill-appearing, toxic-appearing or diaphoretic. HENT:      Head: Normocephalic and atraumatic. Right Ear: External ear normal.      Left Ear: External ear normal.      Nose:      Comments: Deferred      Mouth/Throat:      Comments: Deferred   Eyes:      General:         Right eye: No discharge. Left eye: No discharge. Extraocular Movements: Extraocular movements intact. Conjunctiva/sclera: Conjunctivae normal.      Pupils: Pupils are equal, round, and reactive to light. Neck:      Vascular: No carotid bruit. Cardiovascular:      Rate and Rhythm: Normal rate and regular rhythm. Pulses: Normal pulses. Heart sounds: Normal heart sounds. No murmur heard. No gallop.     Pulmonary:      Effort: Pulmonary effort is normal. No respiratory distress. Breath sounds: Normal breath sounds. No stridor. No wheezing, rhonchi or rales. Comments: Old sternotomy scar  Chest:      Chest wall: No tenderness. Abdominal:      Palpations: Abdomen is soft. Tenderness: There is no abdominal tenderness. Musculoskeletal:         General: No swelling, tenderness, deformity or signs of injury. Cervical back: No rigidity or tenderness. Right lower leg: No edema. Left lower leg: No edema. Lymphadenopathy:      Cervical: No cervical adenopathy. Skin:     General: Skin is warm and dry. Coloration: Skin is not jaundiced or pale. Findings: No bruising, erythema, lesion or rash. Comments: Small varicose veins B distal LE   Neurological:      General: No focal deficit present. Mental Status: He is alert and oriented to person, place, and time. Coordination: Coordination normal.   Psychiatric:         Mood and Affect: Mood normal.         Behavior: Behavior normal.         Thought Content: Thought content normal.         Judgment: Judgment normal.       03/02/21    ECHO ADULT COMPLETE 03/02/2021 3/2/2021    Interpretation Summary  · LV: Estimated LVEF is 55 - 60%. Normal cavity size and systolic function (ejection fraction normal). Mild concentric hypertrophy. Mild hypokinesis of the basal inferoseptal, basal inferior and basal anteroseptal wall(s). Mild (grade 1) left ventricular diastolic dysfunction. · RV: Mildly dilated right ventricle. Mildly reduced systolic function. · RA: Mildly dilated right atrium. · PA: Pulmonary arterial systolic pressure is 20 mmHg. · AO: Mild aortic root dilatation (3.9 cm). Signed by: Barbara Hercules MD on 3/2/2021 11:42 AM    CT Results (most recent):  Results from [place] encounter on 10/02/21    CT CHEST W CONT    Narrative  CT CHEST WITH ENHANCEMENT    INDICATION: Shortness of breath, lung nodule.     TECHNIQUE: Axial images obtained from the thoracic inlet to the level of the  diaphragm following uneventful administration of nonionic intravenous contrast.  Coronal and sagittal reformatted images were obtained. All CT scans at this facility are performed using dose optimization technique as  appropriate to a performed exam, to include automated exposure control,  adjustment of the mA and/or kV according to patient size (including appropriate  matching first site-specific examinations), or use of iterative reconstruction  technique. COMPARISON: None. CHEST FINDINGS:    Thyroid: Unremarkable in its visualized aspects. Pericardium/ Heart: Status post CABG. Heart size is normal.  Aorta/ Vessels: No aneurysm or dissection. Ductus diverticulum. Mild aortic  atherosclerosis. Lymph Nodes: Unremarkable. .    Lungs: Mild regions of subsegmental atelectasis or scarring at the lung bases. Pleura: No effusion. Upper Abdomen: Hepatic steatosis. Moderate colonic stool burden. Bones/soft tissues: Status post median sternotomy. Degenerative disc disease. Impression  No acute findings. Hepatic steatosis. Moderate colonic stool burden. ASSESSMENT and PLAN  Encounter Diagnoses   Name Primary?  Dyspnea on exertion Yes    Coronary artery disease involving native coronary artery of native heart without angina pectoris     S/P CABG x 4     Obesity (BMI 30.0-34. 9)     Mild basilar atelectasis of both lungs        Dyspnea on exertion, unclear etiology but suspect related to restrictive lung disease from atelectasis and other sequela of CABG. Doubt COPD with his smoking history but differential could include bronchospasm and cardiomyopathy or even CAD. Normal R sided pressures on echo above argue against pulmonary hypertension. Will schedule full PFT's to characterize impairments. If PFT's are nondiagnostic would consider Cardio-Pulmonary exercise testing. Pt advised to continue current medications.   Healthy weight discussion as this may worsen restriction. RTC 2-3 months or sooner prn.

## 2022-02-03 ENCOUNTER — OFFICE VISIT (OUTPATIENT)
Dept: PULMONOLOGY | Age: 67
End: 2022-02-03
Payer: MEDICARE

## 2022-02-03 VITALS
HEART RATE: 85 BPM | WEIGHT: 231.6 LBS | OXYGEN SATURATION: 96 % | DIASTOLIC BLOOD PRESSURE: 89 MMHG | RESPIRATION RATE: 20 BRPM | SYSTOLIC BLOOD PRESSURE: 135 MMHG | TEMPERATURE: 98.1 F | BODY MASS INDEX: 34.3 KG/M2 | HEIGHT: 69 IN

## 2022-02-03 DIAGNOSIS — I25.10 CORONARY ARTERY DISEASE INVOLVING NATIVE CORONARY ARTERY OF NATIVE HEART WITHOUT ANGINA PECTORIS: ICD-10-CM

## 2022-02-03 DIAGNOSIS — J98.11 MILD BASILAR ATELECTASIS OF BOTH LUNGS: ICD-10-CM

## 2022-02-03 DIAGNOSIS — Z95.1 S/P CABG X 4: ICD-10-CM

## 2022-02-03 DIAGNOSIS — E66.9 OBESITY (BMI 30.0-34.9): ICD-10-CM

## 2022-02-03 DIAGNOSIS — R06.09 DYSPNEA ON EXERTION: Primary | ICD-10-CM

## 2022-02-03 PROBLEM — E11.9 TYPE 2 DIABETES MELLITUS (HCC): Status: ACTIVE | Noted: 2022-02-03

## 2022-02-03 PROCEDURE — 3017F COLORECTAL CA SCREEN DOC REV: CPT | Performed by: INTERNAL MEDICINE

## 2022-02-03 PROCEDURE — 99204 OFFICE O/P NEW MOD 45 MIN: CPT | Performed by: INTERNAL MEDICINE

## 2022-02-03 PROCEDURE — G8427 DOCREV CUR MEDS BY ELIG CLIN: HCPCS | Performed by: INTERNAL MEDICINE

## 2022-02-03 PROCEDURE — 1101F PT FALLS ASSESS-DOCD LE1/YR: CPT | Performed by: INTERNAL MEDICINE

## 2022-02-03 PROCEDURE — G8510 SCR DEP NEG, NO PLAN REQD: HCPCS | Performed by: INTERNAL MEDICINE

## 2022-02-03 PROCEDURE — G8417 CALC BMI ABV UP PARAM F/U: HCPCS | Performed by: INTERNAL MEDICINE

## 2022-02-03 PROCEDURE — G8536 NO DOC ELDER MAL SCRN: HCPCS | Performed by: INTERNAL MEDICINE

## 2022-02-03 NOTE — PROGRESS NOTES
Evan Harrington presents today for   Chief Complaint   Patient presents with    Shortness of Breath    New Patient    Chest Pain     Comes and Goes       Is someone accompanying this pt? No    Is the patient using any DME equipment during OV? No    -DME Company     Depression Screening:  3 most recent PHQ Screens 12/21/2021   PHQ Not Done -   Little interest or pleasure in doing things Not at all   Feeling down, depressed, irritable, or hopeless Not at all   Total Score PHQ 2 0   Trouble falling or staying asleep, or sleeping too much Not at all   Feeling tired or having little energy More than half the days   Poor appetite, weight loss, or overeating Not at all   Feeling bad about yourself - or that you are a failure or have let yourself or your family down Not at all   Trouble concentrating on things such as school, work, reading, or watching TV Not at all   Moving or speaking so slowly that other people could have noticed; or the opposite being so fidgety that others notice Not at all   Thoughts of being better off dead, or hurting yourself in some way Not at all   PHQ 9 Score 2   How difficult have these problems made it for you to do your work, take care of your home and get along with others Not difficult at all       Learning Assessment:  No flowsheet data found. Abuse Screening:  Abuse Screening Questionnaire 12/21/2021   Do you ever feel afraid of your partner? N   Are you in a relationship with someone who physically or mentally threatens you? N   Is it safe for you to go home? Y       Fall Risk  Fall Risk Assessment, last 12 mths 12/21/2021   Able to walk? Yes   Fall in past 12 months? 0   Do you feel unsteady? 0   Are you worried about falling 0         Coordination of Care:  1. Have you been to the ER, urgent care clinic since your last visit? Hospitalized since your last visit? No    2.  Have you seen or consulted any other health care providers outside of the [address] [place] [address] since your last visit? Include any pap smears or colon screening.  No

## 2022-02-03 NOTE — LETTER
2/3/2022    Patient: Talita Liao   YOB: 1955   Date of Visit: 2/3/2022     Benedicto Paige MD  [address]  [facility] [place]    Dear Benedicto Paige MD,      Thank you for referring Mr. Talita Liao to [address] [place] [address] for evaluation. My notes for this consultation are attached. If you have questions, please do not hesitate to call me. I look forward to following your patient along with you.       Sincerely,    Yin Moore MD

## 2022-03-18 PROBLEM — Z95.1 S/P CABG X 4: Status: ACTIVE | Noted: 2021-12-21

## 2022-03-19 PROBLEM — E11.9 TYPE 2 DIABETES MELLITUS (HCC): Status: ACTIVE | Noted: 2022-02-03

## 2022-03-19 PROBLEM — I25.118 CORONARY ARTERY DISEASE WITH STABLE ANGINA PECTORIS (HCC): Status: ACTIVE | Noted: 2020-07-16

## 2022-03-19 PROBLEM — I25.10 ATHEROSCLEROSIS OF CORONARY ARTERY: Status: ACTIVE | Noted: 2019-12-06

## 2022-03-19 PROBLEM — E78.5 HLD (HYPERLIPIDEMIA): Status: ACTIVE | Noted: 2020-03-13

## 2022-03-19 PROBLEM — R07.89 ATYPICAL CHEST PAIN: Status: ACTIVE | Noted: 2020-03-13

## 2022-03-19 PROBLEM — I48.0 PAROXYSMAL ATRIAL FIBRILLATION (HCC): Status: ACTIVE | Noted: 2020-03-02

## 2022-03-19 PROBLEM — K21.9 GASTROESOPHAGEAL REFLUX DISEASE: Status: ACTIVE | Noted: 2020-01-20

## 2022-04-19 ENCOUNTER — OFFICE VISIT (OUTPATIENT)
Dept: FAMILY MEDICINE CLINIC | Age: 67
End: 2022-04-19
Payer: MEDICARE

## 2022-04-19 VITALS
SYSTOLIC BLOOD PRESSURE: 107 MMHG | DIASTOLIC BLOOD PRESSURE: 69 MMHG | OXYGEN SATURATION: 96 % | RESPIRATION RATE: 20 BRPM | HEIGHT: 69 IN | WEIGHT: 234 LBS | TEMPERATURE: 97.3 F | HEART RATE: 82 BPM | BODY MASS INDEX: 34.66 KG/M2

## 2022-04-19 DIAGNOSIS — M79.642 PAIN IN BOTH HANDS: ICD-10-CM

## 2022-04-19 DIAGNOSIS — E66.9 OBESITY (BMI 30-39.9): ICD-10-CM

## 2022-04-19 DIAGNOSIS — M79.641 PAIN IN BOTH HANDS: ICD-10-CM

## 2022-04-19 DIAGNOSIS — I48.0 PAROXYSMAL ATRIAL FIBRILLATION (HCC): ICD-10-CM

## 2022-04-19 DIAGNOSIS — E11.65 TYPE 2 DIABETES MELLITUS WITH HYPERGLYCEMIA, WITHOUT LONG-TERM CURRENT USE OF INSULIN (HCC): Primary | ICD-10-CM

## 2022-04-19 DIAGNOSIS — E78.5 DYSLIPIDEMIA: ICD-10-CM

## 2022-04-19 DIAGNOSIS — I25.708 CORONARY ARTERY DISEASE OF BYPASS GRAFT OF NATIVE HEART WITH STABLE ANGINA PECTORIS (HCC): ICD-10-CM

## 2022-04-19 DIAGNOSIS — Z13.6 SCREENING FOR AAA (ABDOMINAL AORTIC ANEURYSM): ICD-10-CM

## 2022-04-19 DIAGNOSIS — I10 ESSENTIAL HYPERTENSION: ICD-10-CM

## 2022-04-19 LAB — HBA1C MFR BLD HPLC: 7.2 %

## 2022-04-19 PROCEDURE — G8417 CALC BMI ABV UP PARAM F/U: HCPCS | Performed by: FAMILY MEDICINE

## 2022-04-19 PROCEDURE — 1101F PT FALLS ASSESS-DOCD LE1/YR: CPT | Performed by: FAMILY MEDICINE

## 2022-04-19 PROCEDURE — 2022F DILAT RTA XM EVC RTNOPTHY: CPT | Performed by: FAMILY MEDICINE

## 2022-04-19 PROCEDURE — G8510 SCR DEP NEG, NO PLAN REQD: HCPCS | Performed by: FAMILY MEDICINE

## 2022-04-19 PROCEDURE — G8536 NO DOC ELDER MAL SCRN: HCPCS | Performed by: FAMILY MEDICINE

## 2022-04-19 PROCEDURE — 83036 HEMOGLOBIN GLYCOSYLATED A1C: CPT | Performed by: FAMILY MEDICINE

## 2022-04-19 PROCEDURE — G8752 SYS BP LESS 140: HCPCS | Performed by: FAMILY MEDICINE

## 2022-04-19 PROCEDURE — 3051F HG A1C>EQUAL 7.0%<8.0%: CPT | Performed by: FAMILY MEDICINE

## 2022-04-19 PROCEDURE — G8754 DIAS BP LESS 90: HCPCS | Performed by: FAMILY MEDICINE

## 2022-04-19 PROCEDURE — 3017F COLORECTAL CA SCREEN DOC REV: CPT | Performed by: FAMILY MEDICINE

## 2022-04-19 PROCEDURE — 99215 OFFICE O/P EST HI 40 MIN: CPT | Performed by: FAMILY MEDICINE

## 2022-04-19 PROCEDURE — G8427 DOCREV CUR MEDS BY ELIG CLIN: HCPCS | Performed by: FAMILY MEDICINE

## 2022-04-19 RX ORDER — ROSUVASTATIN CALCIUM 5 MG/1
5 TABLET, COATED ORAL
Qty: 90 TABLET | Refills: 1 | Status: SHIPPED | OUTPATIENT
Start: 2022-04-19 | End: 2022-09-12

## 2022-04-19 NOTE — PROGRESS NOTES
Chief Complaint   Patient presents with    Follow Up Chronic Condition    Hand Pain     fingers locking up     1. \"Have you been to the ER, urgent care clinic since your last visit? Hospitalized since your last visit? \" No    2. \"Have you seen or consulted any other health care providers outside of the [address] [place] [address] since your last visit? \" No     3. For patients aged 39-70: Has the patient had a colonoscopy / FIT/ Cologuard? Yes - no Care Gap present      If the patient is female:    4. For patients aged 41-77: Has the patient had a mammogram within the past 2 years? NA - based on age or sex      11. For patients aged 21-65: Has the patient had a pap smear?  NA - based on age or sex

## 2022-04-19 NOTE — PROGRESS NOTES
[facility]  Harrel Cheadle comes in for f/u care. HTN: Patient has hypertension. Blood pressure is stable. Denies headache, changes in vision or focal weakness. He is on metoprolol XL, Imdur and lisinopril. We will continue with these medications. DM2: Patient has type 2 diabetes mellitus. He is on metformin. Blood glucose numbers have been stable. His last HbA1c was 7.2. We will recheck this today. He will intensify lifestyle and dietary modification. Dyslipidemia: Patient has dyslipidemia. He is on Crestor. Gets muscle aches with a statin. Currently taking 10 mg but would prefer to cut back on this to 5 mg daily. He should exercise and take a diet low in polysaturated fats. We we will send in a prescription for rosuvastatin 5 mg daily. He will also discuss this with his cardiologist.  His previously LDL was 61. CAD: Patient has history of coronary artery disease and has had CABG done in the past.  Denies chest pain, shortness of breath or diaphoresis. He is on isosorbide mononitrate, rosuvastatin, lisinopril, metoprolol XL and aspirin. We will continue with these medications. Cardiac dysrhythmia: Patient has a history of cardiac dysrhythmia. Noted to have had paroxysmal atrial fibrillation. He has cardiac implanted device. He states that the battery to this device has run out. He will discuss this further with a cardiologist.  It has not been monitored in a long time. Denies palpitations or syncope. Obesity: Patient has a BMI of 34.56. He will intensify lifestyle and dietary modification. Hand pain: Patient has pain both hands when he flexes and extends his fingers or tries to make a  especially in the morning. This is likely due to arthritis. He also has a history of trigger finger. Occasionally the 4 fingers do get stiff and stuck in flexed position. Discussed management options for trigger finger. He would prefer to continue with supportive care for now.       Past Medical History  Past Medical History:   Diagnosis Date    Bypass graft mechanical complication (HCC)     quadropple    Diabetes ([address]     Glaucoma     History of staph infection     knee    HTN (hypertension)     Hyperlipemia     SOB (shortness of breath)        Surgical History  Past Surgical History:   Procedure Laterality Date    COLONOSCOPY N/A 3/26/2021    COLONOSCOPY performed by Monique Nicholson MD at [address] HX COLONOSCOPY  2010    normal    HX CORONARY ARTERY BYPASS GRAFT  2019    HX CYST REMOVAL      from back    HX HERNIA REPAIR Bilateral     HX KNEE REPLACEMENT  2017 and 2018    rigjt and left replacement         Medications  Current Outpatient Medications   Medication Sig Dispense Refill    isosorbide mononitrate ER (IMDUR) 30 mg tablet TAKE 1 TABLET BY MOUTH  DAILY 90 Tablet 3    metFORMIN ER (GLUCOPHAGE XR) 500 mg tablet TAKE 1 TABLET BY MOUTH  TWICE DAILY WITH MEALS 180 Tablet 3    colesevelam (WELCHOL) 625 mg tablet TAKE 2 TABS BY MOUTH DAILY. 180 Tablet 1    rosuvastatin (CRESTOR) 20 mg tablet TAKE 1 TABLET BY MOUTH AT  NIGHT (Patient taking differently: 5 mg.) 90 Tablet 3    lisinopriL (PRINIVIL, ZESTRIL) 5 mg tablet Take 5 mg by mouth daily.  latanoprost (XALATAN) 0.005 % ophthalmic solution Administer 1 Drop to both eyes nightly.  multivitamin, tx-iron-ca-min (THERA-M w/ IRON) 9 mg iron-400 mcg tab tablet Take 1 Tab by mouth daily.  acetaminophen (Tylenol Extra Strength) 500 mg tablet Take 500 mg by mouth every six (6) hours as needed for Pain.  lancets misc Check blood glucose  daily. Patient would like One Touch Delica Plus Lancets 886 Each 3    glucose blood VI test strips (blood glucose test) strip Check blood glucose  daily. Patient would like One Touch Yashira test strips 100 Strip 3    metoprolol succinate (TOPROL-XL) 100 mg tablet Take 100 mg by mouth nightly.  aspirin delayed-release 81 mg tablet Take 81 mg by mouth daily.       co-enzyme Q-10 (CO Q-10) 100 mg capsule Take 100 mg by mouth daily. Allergies  Allergies   Allergen Reactions    Atorvastatin Other (comments)     Muscle weakness    Heparin Other (comments)     thrombocytopenia    Heparin Analogues Other (comments)     thrombocytopenia    Medrol [Methylprednisolone] Other (comments)     Muscle weakness    Statins-Hmg-Coa Reductase Inhibitors Other (comments)    Zetia [Ezetimibe] Other (comments)     Muscle joint pain    Zocor [Simvastatin] Other (comments)     Muscle joint pain       Family History  Family History   Problem Relation Age of Onset    Heart Attack Father     Heart Disease Sister     Heart Attack Brother     Leukemia Brother     Anemia Brother        Social History  Social History     Socioeconomic History    Marital status:      Spouse name: Not on file    Number of children: Not on file    Years of education: Not on file    Highest education level: Not on file   Occupational History    Not on file   Tobacco Use    Smoking status: Former Smoker     Packs/day: 0.50     Years: 2.00     Pack years: 1.00     Quit date:      Years since quittin.3    Smokeless tobacco: Never Used   Vaping Use    Vaping Use: Never used   Substance and Sexual Activity    Alcohol use: Yes     Alcohol/week: 1.0 standard drink     Types: 1 Shots of liquor per week     Comment: social    Drug use: Never    Sexual activity: Yes     Partners: Female   Other Topics Concern    Not on file   Social History Narrative    Not on file     Social Determinants of Health     Financial Resource Strain:     Difficulty of Paying Living Expenses: Not on file   Food Insecurity:     Worried About Running Out of Food in the Last Year: Not on file    Yuval of Food in the Last Year: Not on file   Transportation Needs:     Lack of Transportation (Medical): Not on file    Lack of Transportation (Non-Medical):  Not on file   Physical Activity:     Days of Exercise per Week: Not on file    Minutes of Exercise per Session: Not on file   Stress:     Feeling of Stress : Not on file   Social Connections:     Frequency of Communication with Friends and Family: Not on file    Frequency of Social Gatherings with Friends and Family: Not on file    Attends [employer] Services: Not on file    Active Member of [address] [place] [address] or Organizations: Not on file    Attends Club or Organization Meetings: Not on file    Marital Status: Not on file   Intimate Partner Violence:     Fear of Current or Ex-Partner: Not on file    Emotionally Abused: Not on file    Physically Abused: Not on file    Sexually Abused: Not on file   Housing Stability:     Unable to Pay for Housing in the Last Year: Not on file    Number of [place] in the Last Year: Not on file    Unstable Housing in the Last Year: Not on file       Review of Systems  Review of Systems - Review of all systems is negative except as noted above in the HPI.     Vital Signs  Visit Vitals  /69 (BP 1 Location: Left upper arm, BP Patient Position: Sitting, BP Cuff Size: Adult long)   Pulse 82   Temp 97.3 °F (36.3 °C) (Temporal)   Resp 20   Ht 5' 9\" (1.753 m)   Wt 234 lb (106.1 kg)   SpO2 96%   BMI 34.56 kg/m²         Physical Exam  Physical Examination: General appearance - alert, well appearing, and in no distress, oriented to person, place, and time, overweight and acyanotic, in no respiratory distress  Mental status - alert, oriented to person, place, and time, affect appropriate to mood  Lymphatics - no palpable lymphadenopathy, no hepatosplenomegaly  Chest - clear to auscultation, no wheezes, rales or rhonchi, symmetric air entry  Heart - systolic murmur 2/6 at lower left sternal border  Abdomen - soft, nontender, nondistended, no masses or organomegaly  Back exam - limited range of motion  Neurological - normal muscle tone, no tremors, strength 5/5  Musculoskeletal - osteoarthritic changes noted in both hands  Extremities - no pedal edema noted, intact peripheral pulses      Results  Results for orders placed or performed during the hospital encounter of 12/23/21   CBC WITH AUTOMATED DIFF   Result Value Ref Range    WBC 5.6 4.6 - 13.2 K/uL    RBC 4.50 4.35 - 5.65 M/uL    HGB 14.3 13.0 - 16.0 g/dL    HCT 43.5 36.0 - 48.0 %    MCV 96.7 78.0 - 100.0 FL    MCH 31.8 24.0 - 34.0 PG    MCHC 32.9 31.0 - 37.0 g/dL    RDW 12.0 11.6 - 14.5 %    PLATELET 639 716 - 478 K/uL    MPV 9.0 (L) 9.2 - 11.8 FL    NRBC 0.0 0  WBC    ABSOLUTE NRBC 0.00 0.00 - 0.01 K/uL    NEUTROPHILS 61 40 - 73 %    LYMPHOCYTES 25 21 - 52 %    MONOCYTES 11 (H) 3 - 10 %    EOSINOPHILS 2 0 - 5 %    BASOPHILS 1 0 - 2 %    IMMATURE GRANULOCYTES 0 0.0 - 0.5 %    ABS. NEUTROPHILS 3.4 1.8 - 8.0 K/UL    ABS. LYMPHOCYTES 1.4 0.9 - 3.6 K/UL    ABS. MONOCYTES 0.6 0.05 - 1.2 K/UL    ABS. EOSINOPHILS 0.1 0.0 - 0.4 K/UL    ABS. BASOPHILS 0.0 0.0 - 0.1 K/UL    ABS. IMM. GRANS. 0.0 0.00 - 0.04 K/UL    DF AUTOMATED     CK   Result Value Ref Range     39 - 308 U/L   LIPID PANEL   Result Value Ref Range    LIPID PROFILE          Cholesterol, total 134 <200 MG/DL    Triglyceride 157 (H) <150 MG/DL    HDL Cholesterol 41 40 - 60 MG/DL    LDL, calculated 61.6 0 - 100 MG/DL    VLDL, calculated 31.4 MG/DL    CHOL/HDL Ratio 3.3 0 - 5.0     METABOLIC PANEL, COMPREHENSIVE   Result Value Ref Range    Sodium 136 136 - 145 mmol/L    Potassium 4.4 3.5 - 5.5 mmol/L    Chloride 104 100 - 111 mmol/L    CO2 26 21 - 32 mmol/L    Anion gap 6 3.0 - 18 mmol/L    Glucose 154 (H) 74 - 99 mg/dL    BUN 14 7.0 - 18 MG/DL    Creatinine 0.88 0.6 - 1.3 MG/DL    BUN/Creatinine ratio 16 12 - 20      GFR est AA >60 >60 ml/min/1.73m2    GFR est non-AA >60 >60 ml/min/1.73m2    Calcium 9.1 8.5 - 10.1 MG/DL    Bilirubin, total 0.4 0.2 - 1.0 MG/DL    ALT (SGPT) 41 16 - 61 U/L    AST (SGOT) 19 10 - 38 U/L    Alk.  phosphatase 68 45 - 117 U/L    Protein, total 7.0 6.4 - 8.2 g/dL    Albumin 3.7 3.4 - 5.0 g/dL    Globulin 3.3 2.0 - 4.0 g/dL    A-G Ratio 1.1 0.8 - 1.7     HEMOGLOBIN A1C WITH EAG   Result Value Ref Range    Hemoglobin A1c 7.2 (H) 4.2 - 5.6 %    Est. average glucose 160 mg/dL       ASSESSMENT and PLAN    ICD-10-CM ICD-9-CM    1. Type 2 diabetes mellitus with hyperglycemia, without long-term current use of insulin (HCC)  E11.65 250.00 AMB POC HEMOGLOBIN A1C     790.29 rosuvastatin (CRESTOR) 5 mg tablet   2. Paroxysmal atrial fibrillation (HCC)  I48.0 427.31    3. Coronary artery disease of bypass graft of native heart with stable angina pectoris (HCC)  I25.708 414.05 rosuvastatin (CRESTOR) 5 mg tablet     413.9    4. Screening for AAA (abdominal aortic aneurysm)  Z13.6 V81.2 AAA SCREENING   5. Dyslipidemia  E78.5 272.4    6. Essential hypertension  I10 401.9    7. Pain in both hands  M79.641 729.5     M79.642     8. Obesity (BMI 30-39. 9)  E66.9 278.00      lab results and schedule of future lab studies reviewed with patient  reviewed diet, exercise and weight control  cardiovascular risk and specific lipid/LDL goals reviewed  reviewed medications and side effects in detail  specific diabetic recommendations: low cholesterol diet, weight control and daily exercise discussed, home glucose monitoring emphasized, all medications, side effects and compliance discussed carefully, foot care discussed and Podiatry visits discussed, annual eye examinations at Ophthalmology discussed and glycohemoglobin and other lab monitoring discussed      I have discussed the diagnosis with the patient and the intended plan of care as seen in the above orders. The patient has received an after-visit summary and questions were answered concerning future plans. I have discussed medication, side effects, and warnings with the patient in detail. The patient verbalized understanding and is in agreement with the plan of care. The patient will contact the office with any additional concerns.     I spent at least 40 minutes on this visit with this established patient. Srikanth Hawkins MD    PLEASE NOTE:   This document has been produced using voice recognition software.  Unrecognized errors in transcription may be present

## 2022-04-21 NOTE — PROGRESS NOTES
Please let patient know HbA1c 7.2. He will intensify lifestyle and dietary modification. He is on metformin.   Ottoniel [place] MD

## 2022-04-27 RX ORDER — COLESEVELAM 180 1/1
TABLET ORAL
Qty: 180 TABLET | Refills: 1 | Status: SHIPPED | OUTPATIENT
Start: 2022-04-27

## 2022-05-04 NOTE — PROGRESS NOTES
Attempted to contact patient with no answer. Patient will have a letter in the mail today. Please sign order

## 2022-05-05 ENCOUNTER — HOSPITAL ENCOUNTER (OUTPATIENT)
Dept: ULTRASOUND IMAGING | Age: 67
Discharge: HOME | End: 2022-05-05
Attending: FAMILY MEDICINE
Payer: MEDICARE

## 2022-05-05 DIAGNOSIS — Z13.6 SCREENING FOR AAA (ABDOMINAL AORTIC ANEURYSM): ICD-10-CM

## 2022-05-05 PROCEDURE — 76706 US ABDL AORTA SCREEN AAA: CPT

## 2022-05-17 ENCOUNTER — OFFICE VISIT (OUTPATIENT)
Dept: CARDIOLOGY CLINIC | Age: 67
End: 2022-05-17
Payer: MEDICARE

## 2022-05-17 VITALS
DIASTOLIC BLOOD PRESSURE: 74 MMHG | HEART RATE: 81 BPM | OXYGEN SATURATION: 96 % | SYSTOLIC BLOOD PRESSURE: 129 MMHG | BODY MASS INDEX: 34.36 KG/M2 | HEIGHT: 69 IN | WEIGHT: 232 LBS

## 2022-05-17 DIAGNOSIS — R06.02 SHORTNESS OF BREATH: ICD-10-CM

## 2022-05-17 DIAGNOSIS — Z95.1 HX OF CABG: ICD-10-CM

## 2022-05-17 DIAGNOSIS — E78.5 HYPERLIPIDEMIA, UNSPECIFIED HYPERLIPIDEMIA TYPE: ICD-10-CM

## 2022-05-17 DIAGNOSIS — I48.0 PAROXYSMAL ATRIAL FIBRILLATION (HCC): ICD-10-CM

## 2022-05-17 DIAGNOSIS — I25.10 CORONARY ARTERY DISEASE INVOLVING NATIVE CORONARY ARTERY OF NATIVE HEART WITHOUT ANGINA PECTORIS: Primary | ICD-10-CM

## 2022-05-17 PROCEDURE — G8752 SYS BP LESS 140: HCPCS | Performed by: INTERNAL MEDICINE

## 2022-05-17 PROCEDURE — G8427 DOCREV CUR MEDS BY ELIG CLIN: HCPCS | Performed by: INTERNAL MEDICINE

## 2022-05-17 PROCEDURE — G8417 CALC BMI ABV UP PARAM F/U: HCPCS | Performed by: INTERNAL MEDICINE

## 2022-05-17 PROCEDURE — G8754 DIAS BP LESS 90: HCPCS | Performed by: INTERNAL MEDICINE

## 2022-05-17 PROCEDURE — 3017F COLORECTAL CA SCREEN DOC REV: CPT | Performed by: INTERNAL MEDICINE

## 2022-05-17 PROCEDURE — 99214 OFFICE O/P EST MOD 30 MIN: CPT | Performed by: INTERNAL MEDICINE

## 2022-05-17 PROCEDURE — G8536 NO DOC ELDER MAL SCRN: HCPCS | Performed by: INTERNAL MEDICINE

## 2022-05-17 PROCEDURE — 1101F PT FALLS ASSESS-DOCD LE1/YR: CPT | Performed by: INTERNAL MEDICINE

## 2022-05-17 PROCEDURE — G8432 DEP SCR NOT DOC, RNG: HCPCS | Performed by: INTERNAL MEDICINE

## 2022-05-17 NOTE — PROGRESS NOTES
HISTORY OF PRESENT ILLNESS  Lio Deluca is a 78 y.o. male. 2/12/2021  Patient seen today for new patient evaluation he has history of CAD, paroxysmal A. fib prior CABG and hyperlipidemia. Patient is recovering of increasing shortness of breath. Exertional shortness of breath is persistent since CABG    Patient has increased shortness of breath on exertion happens with climbing stairs his activity. Occasional chest pain which are short lasting left-sided not related activity or exertion. Denies orthopnea PND no peripheral edema. Patient had prior PCI's and subsequent CABG in December 2019. He had a complex course requiring reopening of the chest.  Postoperatively he had atrial fibrillation with no recurrence after that. His anticoagulation has been discontinued since then. 9/2021  Patient is here for follow-up. Patient has again noticed increasing shortness of breath. Happens on minimal activity exertion. Also complain of left-sided dull pain not related to activity exertion lasting less than a minute at rest.    Follow-up  Associated symptoms include shortness of breath. Pertinent negatives include no chest pain, no abdominal pain and no headaches. Review of Systems   Constitutional: Negative for chills and fever. HENT: Negative for nosebleeds. Eyes: Negative for blurred vision and double vision. Respiratory: Positive for shortness of breath. Negative for cough, hemoptysis, sputum production and wheezing. Cardiovascular: Negative for chest pain, palpitations, orthopnea, claudication, leg swelling and PND. Gastrointestinal: Negative for abdominal pain, heartburn, nausea and vomiting. Musculoskeletal: Negative for myalgias. Skin: Negative for rash. Neurological: Negative for dizziness, weakness and headaches. Endo/Heme/Allergies: Does not bruise/bleed easily.      Family History   Problem Relation Age of Onset    Heart Attack Father     Heart Disease Sister     Heart Attack Brother     Leukemia Brother     Anemia Brother        Past Medical History:   Diagnosis Date    Bypass graft mechanical complication [facility] [address]     quadropple    Diabetes ([facility] [address]     Glaucoma     History of staph infection     knee    HTN (hypertension)     Hyperlipemia     SOB (shortness of breath)        Past Surgical History:   Procedure Laterality Date    COLONOSCOPY N/A 3/26/2021    COLONOSCOPY performed by Robbin Mendoza MD at [address] HX COLONOSCOPY      normal    HX CORONARY ARTERY BYPASS GRAFT  2019    HX CYST REMOVAL      from back    HX HERNIA REPAIR Bilateral     HX KNEE REPLACEMENT  2017 and 2018    rigjt and left replacement        Social History     Tobacco Use    Smoking status: Former Smoker     Packs/day: 0.50     Years: 2.00     Pack years: 1.00     Quit date:      Years since quittin.4    Smokeless tobacco: Never Used   Substance Use Topics    Alcohol use: Yes     Alcohol/week: 1.0 standard drink     Types: 1 Shots of liquor per week     Comment: social       Allergies   Allergen Reactions    Atorvastatin Other (comments)     Muscle weakness    Heparin Other (comments)     thrombocytopenia    Heparin Analogues Other (comments)     thrombocytopenia    Medrol [Methylprednisolone] Other (comments)     Muscle weakness    Statins-Hmg-Coa Reductase Inhibitors Other (comments)    Zetia [Ezetimibe] Other (comments)     Muscle joint pain    Zocor [Simvastatin] Other (comments)     Muscle joint pain       Prior to Admission medications    Medication Sig Start Date End Date Taking? Authorizing Provider   colesevelam (WELCHOL) 625 mg tablet TAKE 2 TABS BY MOUTH DAILY. 22  Yes Malena Grider MD   rosuvastatin (CRESTOR) 5 mg tablet Take 1 Tablet by mouth nightly.  22  Yes Malena Grider MD   isosorbide mononitrate ER (IMDUR) 30 mg tablet TAKE 1 TABLET BY MOUTH  DAILY 22  Yes Kiko Purdy NP   metFORMIN ER (GLUCOPHAGE XR) 500 mg tablet TAKE 1 TABLET BY MOUTH  TWICE DAILY WITH MEALS 12/13/21  Yes Malena Obrien MD   lisinopriL (PRINIVIL, ZESTRIL) 5 mg tablet Take 5 mg by mouth daily. Yes Provider, Historical   latanoprost (XALATAN) 0.005 % ophthalmic solution Administer 1 Drop to both eyes nightly. Yes Provider, Historical   multivitamin, tx-iron-ca-min (THERA-M w/ IRON) 9 mg iron-400 mcg tab tablet Take 1 Tab by mouth daily. Yes Provider, Historical   acetaminophen (Tylenol Extra Strength) 500 mg tablet Take 500 mg by mouth every six (6) hours as needed for Pain. Yes Provider, Historical   lancets misc Check blood glucose  daily. Patient would like One Touch Delica Plus Lancets 6/40/92  Yes Anny Juarez MD   glucose blood VI test strips (blood glucose test) strip Check blood glucose  daily. Patient would like One Touch Yashira test strips 4/14/20  Yes Malena Grider MD   metoprolol succinate (TOPROL-XL) 100 mg tablet Take 100 mg by mouth nightly. 12/31/19  Yes Provider, Historical   aspirin delayed-release 81 mg tablet Take 81 mg by mouth daily. Yes Provider, Historical   co-enzyme Q-10 (CO Q-10) 100 mg capsule Take 100 mg by mouth daily. Yes Provider, Historical         Visit Vitals  /74 (BP 1 Location: Left upper arm, BP Patient Position: Sitting, BP Cuff Size: Adult)   Pulse 81   Ht 5' 9\" (1.753 m)   Wt 105.2 kg (232 lb)   SpO2 96%   BMI 34.26 kg/m²         Physical Exam  Constitutional:       Appearance: He is well-developed. HENT:      Head: Normocephalic and atraumatic. Eyes:      Conjunctiva/sclera: Conjunctivae normal.   Neck:      Thyroid: No thyromegaly. Vascular: No JVD. Trachea: No tracheal deviation. Cardiovascular:      Rate and Rhythm: Normal rate and regular rhythm. Heart sounds: Normal heart sounds. No murmur heard. No friction rub. No gallop. Pulmonary:      Effort: No respiratory distress. Breath sounds: Normal breath sounds. No wheezing or rales.    Chest:      Chest wall: No tenderness. Abdominal:      Palpations: Abdomen is soft. Tenderness: There is no abdominal tenderness. Musculoskeletal:      Cervical back: Neck supple. Skin:     General: Skin is warm and dry. Neurological:      Mental Status: He is alert and oriented to person, place, and time. Mr. Clay Lima has a reminder for a \"due or due soon\" health maintenance. I have asked that he contact his primary care provider for follow-up on this health maintenance. No flowsheet data found. I have personally reviewed patient's records available from hospital and other providers and incorporated findings in patient care. Notes, lab, ED EKG, chest x-ray, stress test  CORONARY ANGIOGRAPHY:   LM:  Large caliber vessel with no significant disease.    LAD: Large caliber vessel with three diagonal branches and small   septal  vessels with a 60% tubular stenosis within a   previously placed stent in the proximal LAD. The mid LAD has a   50% tubular stenosis. The remainder of the LAD has mild luminal   irregularities.  There are left to right collaterals arising from   the septal perforators. --1st Diagonal: Small caliber vessel with no significant disease. --2nd Diagonal: Small caliber vessel with mild diffuse disease. LEFT CX:  Large caliber, co-dominant vessel with 80% ostial   stenosis.   --OM1: Moderate caliber vessel with a 50% tubular eccentric   stenosis in the mid-segment. Ramesh Brittle --AV groove Cx: Small caliber vessel with mild diffuse disease. RAMUS: Large caliber vessel with 50% proximal stenosis followed   by a 90% focal stenosis in the proximal to mid segment. RCA: The right coronary artery is a moderate to large caliber,   co-dominant vessel with a 80% focal stenosis in the proximal   segment. The mid-segment has an aneurysmal segment followed by a   30% focal stenosis. --PDA: Moderate caliber vessel with no significant disease.   --CAROLYNE: Moderate caliber vessel with 30% tubular stenosis in the mid-segment.       IMPRESSION:  1. Multivessel Coronary Artery Disease. 2. Normal left ventricular end diastolic pressure. 12/2019  OPERATION PERFORMED:  Coronary artery bypass grafting x4 with 1 artery and 3 veins, left internal mammary artery to left anterior descending, reversed greater saphenous vein to the right coronary, and reversed left greater saphenous vein as a sequential to the circ 1 (ramus) and circ 2, left endoscopic vein harvest, and SPY angiography. ECHO LIMITED STUDY (W/O COLOR)3/13/2020  [employer]  Component Name Value Ref Range   EF Echo 53     Result Impression   :   HYPOKINESIS OF THE BASAL-MID INFERIOR, BASAL-MID INFEROLATERAL AND BASAL INFEROSEPTAL WALLS   WITH PRESERVED GLOBAL LEFT VENTRICULAR SYSTOLIC FUNCTION WITH A CALCULATED BI-PLANE OF 53%. MILDLY DILATED RIGHT VENTRICULAR SIZE WITH REDUCED FUNCTION. INDETERMINATE DIASTOLIC FUNCTION. MILD LEFT VENTRICULAR HYPERTROPHY PRESENT.   BOWING OF THE INTERATRIAL SEPTUM TOWARDS THE LEFT. NO EVIDENCE OF TRICUSPID REGURGITATION. ALEXANDRU Rest/Stress Multiple Spect3/13/2020  [employer]  Component Name Value Ref Range   EF Nuc 65     Result Impression    THIS MYOCARDIAL PERFUSION STUDY IS NORMAL   THIS IS A LOW RISK STUDY   MYOCARDIAL PERFUSION IMAGING IS NORMAL. NO ABNORMALITIES OR EVIDENCE OF ISCHEMIA OR PRIOR INFARCTION. THE CALCULATED LV EJECTION FRACTION WAS 65%. OVERALL LEFT VENTRICULAR SYSTOLIC FUNCTION WAS NORMAL WITHOUT REGIONAL WALL MOTION   ABNORMALITIES. IMPRESSION nuclear stress test-7/2020  IMPRESSION:  1. No evidence of reversible myocardial ischemia. 2. This is a low risk study. Interpretation Summary 2/2021    · LV: Estimated LVEF is 55 - 60%. Normal cavity size and systolic function (ejection fraction normal). Mild concentric hypertrophy. Mild hypokinesis of the basal inferoseptal, basal inferior and basal anteroseptal wall(s).  Mild (grade 1) left ventricular diastolic dysfunction. · RV: Mildly dilated right ventricle. Mildly reduced systolic function. · RA: Mildly dilated right atrium. · PA: Pulmonary arterial systolic pressure is 20 mmHg. · AO: Mild aortic root dilatation (3.9 cm). IMPRESSION ct 10/2021     No acute findings. Hepatic steatosis. Moderate colonic stool burden. FINDINGS: ultrasound 5/2022  Proximal abdominal aorta measures approximately 2.2 x 2.1 cm. Mid abdominal aorta measures 2.1 x 2 cm diameter. Distal abdominal aorta appears mildly ectatic, measuring up to 2.2 x 2.2 cm  diameter. Right common iliac artery 1.3 cm diameter. Left common iliac artery 1.3 cm diameter.     Several focal areas of aortic wall calcification throughout the abdominal aorta  consistent with atherosclerotic disease. Additional calcifications at the level  of the iliac vessels. Visualized portion of the vena cava grossly unremarkable.     IMPRESSION  1. Nonaneurysmal but mildly ectatic abdominal aorta with scattered areas of  atherosclerotic wall calcification as above. Assessment         ICD-10-CM ICD-9-CM    1. Coronary artery disease involving native coronary artery of native heart without angina pectoris  I25.10 414.01     Stable continue treatment monitor   2. Hx of CABG  Z95.1 V45.81     Stable   3. Hyperlipidemia, unspecified hyperlipidemia type  E78.5 272.4     Continue treatment lab with PCP   4. Paroxysmal atrial fibrillation (HCC)  I48.0 427.31     Stable monitor   5. Shortness of breath  R06.02 786.05     Stable continue treatment monitor   2/2021  Seen for evaluation of CAD, multiple old stents subsequent CABG. Had loop recorder for apparent syncopal episode in the hospital.  Patient had postop A. fib. And multiple other problems. Extensive chart review including prior bypass surgeries cardiac catheterization and lab reviewed. Patient had wall motion abnormality on prior echo so we will follow-up to make sure LV dysfunction has not occurred. If we see that there is significant LV dysfunction consider cardiac catheterization with possible angina equivalent. Patient is on multiple medical management. Recent increase in rosuvastatin was done for elevated LDL. Follow-up lab. If echocardiogram shows no significant change or LV dysfunction consider pulmonary function test once labs open up. This would to assess for any restriction post operatively  no concern of CHF/diuretic as needed  3/2021  Echocardiogram exam with normal ejection fraction no pulmonary hypertension. Symptoms are significantly improved after stopping Lasix. As he does not have any angina and negative nuclear stress test we will also hold Ranexa. Any other treatment. We will hold off on PFT at present  9/2021  Continues to be short of breath. We will follow with PFT as well as follow-up CT for lung nodule that he had before. 5/2022  Cardiac status stable. CT scan unremarkable. Abdominal ultrasound with no aneurysm. Patient has a loop recorder that was implanted in [place] about 3-1/2 years ago battery is dead now. At present we will leave it in. Shortness of breath are stable in view of that we will not pursue PFT at present. Patient is intolerant to higher dose of statin currently able to tolerate rosuvastatin 5 mg a day. Postop A. fib no recurrence  There are no discontinued medications. No orders of the defined types were placed in this encounter. Follow-up and Dispositions    · Return in about 6 months (around 11/17/2022).

## 2022-05-17 NOTE — PROGRESS NOTES
1. Have you been to the ER, urgent care clinic since your last visit? Hospitalized since your last visit? No    2. Have you seen or consulted any other health care providers outside of the [address] [place] [address] since your last visit? Include any pap smears or colon screening. No     3. Do you need any refills today?   no

## 2022-08-01 ENCOUNTER — OFFICE VISIT (OUTPATIENT)
Dept: FAMILY MEDICINE CLINIC | Age: 67
End: 2022-08-01
Payer: MEDICARE

## 2022-08-01 VITALS
DIASTOLIC BLOOD PRESSURE: 66 MMHG | OXYGEN SATURATION: 97 % | SYSTOLIC BLOOD PRESSURE: 104 MMHG | HEART RATE: 85 BPM | WEIGHT: 230 LBS | HEIGHT: 69 IN | BODY MASS INDEX: 34.07 KG/M2 | TEMPERATURE: 97.4 F | RESPIRATION RATE: 20 BRPM

## 2022-08-01 DIAGNOSIS — Z12.5 SCREENING FOR MALIGNANT NEOPLASM OF PROSTATE: ICD-10-CM

## 2022-08-01 DIAGNOSIS — I25.708 CORONARY ARTERY DISEASE OF BYPASS GRAFT OF NATIVE HEART WITH STABLE ANGINA PECTORIS (HCC): ICD-10-CM

## 2022-08-01 DIAGNOSIS — G89.29 CHRONIC PAIN OF RIGHT KNEE: Primary | ICD-10-CM

## 2022-08-01 DIAGNOSIS — I10 ESSENTIAL HYPERTENSION: ICD-10-CM

## 2022-08-01 DIAGNOSIS — E78.5 DYSLIPIDEMIA: ICD-10-CM

## 2022-08-01 DIAGNOSIS — M25.562 CHRONIC PAIN OF LEFT KNEE: ICD-10-CM

## 2022-08-01 DIAGNOSIS — Z23 ENCOUNTER FOR IMMUNIZATION: ICD-10-CM

## 2022-08-01 DIAGNOSIS — M25.561 CHRONIC PAIN OF RIGHT KNEE: Primary | ICD-10-CM

## 2022-08-01 DIAGNOSIS — I48.0 PAROXYSMAL ATRIAL FIBRILLATION (HCC): ICD-10-CM

## 2022-08-01 DIAGNOSIS — E11.65 TYPE 2 DIABETES MELLITUS WITH HYPERGLYCEMIA, WITHOUT LONG-TERM CURRENT USE OF INSULIN (HCC): ICD-10-CM

## 2022-08-01 DIAGNOSIS — G89.29 CHRONIC PAIN OF LEFT KNEE: ICD-10-CM

## 2022-08-01 LAB
A-G RATIO,AGRAT: 1.8 RATIO (ref 1.1–2.6)
ABSOLUTE LYMPHOCYTE COUNT, 10803: 1.7 K/UL (ref 1–4.8)
ALBUMIN SERPL-MCNC: 4.5 G/DL (ref 3.5–5)
ALP SERPL-CCNC: 73 U/L (ref 40–125)
ALT SERPL-CCNC: 38 U/L (ref 5–40)
ANION GAP SERPL CALC-SCNC: 10 MMOL/L (ref 3–15)
AST SERPL W P-5'-P-CCNC: 29 U/L (ref 10–37)
BASOPHILS # BLD: 0 K/UL (ref 0–0.2)
BASOPHILS NFR BLD: 0 % (ref 0–2)
BILIRUB SERPL-MCNC: 0.6 MG/DL (ref 0.2–1.2)
BUN SERPL-MCNC: 18 MG/DL (ref 6–22)
CALCIUM SERPL-MCNC: 9.7 MG/DL (ref 8.4–10.5)
CHLORIDE SERPL-SCNC: 100 MMOL/L (ref 98–110)
CO2 SERPL-SCNC: 27 MMOL/L (ref 20–32)
CREAT SERPL-MCNC: 0.9 MG/DL (ref 0.8–1.6)
EOSINOPHIL # BLD: 0.1 K/UL (ref 0–0.5)
EOSINOPHIL NFR BLD: 2 % (ref 0–6)
ERYTHROCYTE [DISTWIDTH] IN BLOOD BY AUTOMATED COUNT: 12.5 % (ref 10–15.5)
GLOBULIN,GLOB: 2.5 G/DL (ref 2–4)
GLOMERULAR FILTRATION RATE: >60 ML/MIN/1.73 SQ.M.
GLUCOSE SERPL-MCNC: 113 MG/DL (ref 70–99)
GRANULOCYTES,GRANS: 61 % (ref 40–75)
HCT VFR BLD AUTO: 42 % (ref 37.8–52.2)
HGB BLD-MCNC: 14 G/DL (ref 12.6–17.1)
LYMPHOCYTES, LYMLT: 25 % (ref 20–45)
MCH RBC QN AUTO: 33 PG (ref 26–34)
MCHC RBC AUTO-ENTMCNC: 33 G/DL (ref 31–36)
MCV RBC AUTO: 99 FL (ref 80–95)
MICROALBUMIN UR TEST STR-MCNC: 10 MG/L
MICROALBUMIN/CREAT RATIO POC: <30 MG/G
MONOCYTES # BLD: 0.8 K/UL (ref 0.1–1)
MONOCYTES NFR BLD: 12 % (ref 3–12)
NEUTROPHILS # BLD AUTO: 4.1 K/UL (ref 1.8–7.7)
PLATELET # BLD AUTO: 211 K/UL (ref 140–440)
PMV BLD AUTO: 9.3 FL (ref 9–13)
POTASSIUM SERPL-SCNC: 4.2 MMOL/L (ref 3.5–5.5)
PROT SERPL-MCNC: 7 G/DL (ref 6.2–8.1)
RBC # BLD AUTO: 4.26 M/UL (ref 3.8–5.8)
SODIUM SERPL-SCNC: 137 MMOL/L (ref 133–145)
WBC # BLD AUTO: 6.7 K/UL (ref 4–11)

## 2022-08-01 PROCEDURE — 2022F DILAT RTA XM EVC RTNOPTHY: CPT | Performed by: FAMILY MEDICINE

## 2022-08-01 PROCEDURE — 3017F COLORECTAL CA SCREEN DOC REV: CPT | Performed by: FAMILY MEDICINE

## 2022-08-01 PROCEDURE — G8752 SYS BP LESS 140: HCPCS | Performed by: FAMILY MEDICINE

## 2022-08-01 PROCEDURE — 90677 PCV20 VACCINE IM: CPT | Performed by: FAMILY MEDICINE

## 2022-08-01 PROCEDURE — G8536 NO DOC ELDER MAL SCRN: HCPCS | Performed by: FAMILY MEDICINE

## 2022-08-01 PROCEDURE — G8427 DOCREV CUR MEDS BY ELIG CLIN: HCPCS | Performed by: FAMILY MEDICINE

## 2022-08-01 PROCEDURE — 82044 UR ALBUMIN SEMIQUANTITATIVE: CPT | Performed by: FAMILY MEDICINE

## 2022-08-01 PROCEDURE — G8417 CALC BMI ABV UP PARAM F/U: HCPCS | Performed by: FAMILY MEDICINE

## 2022-08-01 PROCEDURE — 1123F ACP DISCUSS/DSCN MKR DOCD: CPT | Performed by: FAMILY MEDICINE

## 2022-08-01 PROCEDURE — 1101F PT FALLS ASSESS-DOCD LE1/YR: CPT | Performed by: FAMILY MEDICINE

## 2022-08-01 PROCEDURE — G8754 DIAS BP LESS 90: HCPCS | Performed by: FAMILY MEDICINE

## 2022-08-01 PROCEDURE — 99215 OFFICE O/P EST HI 40 MIN: CPT | Performed by: FAMILY MEDICINE

## 2022-08-01 PROCEDURE — 3051F HG A1C>EQUAL 7.0%<8.0%: CPT | Performed by: FAMILY MEDICINE

## 2022-08-01 PROCEDURE — G0009 ADMIN PNEUMOCOCCAL VACCINE: HCPCS | Performed by: FAMILY MEDICINE

## 2022-08-01 PROCEDURE — G8510 SCR DEP NEG, NO PLAN REQD: HCPCS | Performed by: FAMILY MEDICINE

## 2022-08-01 NOTE — PROGRESS NOTES
[facility]  Elen Ireland comes in for follow up care. Knee pain: Patient has bilateral knee pain and occasionally both knees give way. He has noticed some swelling in the knees. He has stiffness and discomfort especially in the morning. Patient has had bilateral total knee arthroplasties done in the past.  He takes Tylenol for pain as needed. Given persistence of symptoms I will order an x-ray of both knees. We will refer him to the orthopedist for evaluation. Diabetes mellitus type 2: Patient has type 2 diabetes mellitus. Blood glucose numbers have been stable. His last HbA1c was 7.2. He will intensify lifestyle and dietary modification. He is on metformin. We will recheck labs. Hypertension: Patient has hypertension. Blood pressure is stable. Denies headache, changes in vision or focal weakness. He is on lisinopril, metoprolol and the Imdur. We will check labs. CAD: Patient has coronary artery disease. He is on metoprolol and Imdur. Denies chest pain, shortness of breath or diaphoresis. Also on aspirin, Crestor and lisinopril. He has been followed up with a cardiologist.  We will continue current treatment plan. Dyslipidemia: Patient has dyslipidemia. He is on Crestor and WelChol. He will continue with these medications. He will take a diet low in polysaturated fats. Obesity: Patient has a BMI 33.97. He will intensify lifestyle and dietary modification. Health maintenance: Patient will get PCV 20 vaccine today. I will do PSA screening to check for prostate cancer.       Past Medical History  Past Medical History:   Diagnosis Date    Bypass graft mechanical complication (HCC)     quadropple    Diabetes ([address]     Glaucoma     History of staph infection     knee    HTN (hypertension)     Hyperlipemia     SOB (shortness of breath)        Surgical History  Past Surgical History:   Procedure Laterality Date    COLONOSCOPY N/A 3/26/2021    COLONOSCOPY performed by Corbin Langford MD at [facility]    HX COLONOSCOPY  2010    normal    HX CORONARY ARTERY BYPASS GRAFT  2019    HX CYST REMOVAL      from back    HX HERNIA REPAIR Bilateral     HX KNEE REPLACEMENT  2017 and 2018    rigjt and left replacement         Medications  Current Outpatient Medications   Medication Sig Dispense Refill    colesevelam (WELCHOL) 625 mg tablet TAKE 2 TABS BY MOUTH DAILY. 180 Tablet 1    rosuvastatin (CRESTOR) 5 mg tablet Take 1 Tablet by mouth nightly. 90 Tablet 1    isosorbide mononitrate ER (IMDUR) 30 mg tablet TAKE 1 TABLET BY MOUTH  DAILY 90 Tablet 3    metFORMIN ER (GLUCOPHAGE XR) 500 mg tablet TAKE 1 TABLET BY MOUTH  TWICE DAILY WITH MEALS 180 Tablet 3    lisinopriL (PRINIVIL, ZESTRIL) 5 mg tablet Take 5 mg by mouth daily. latanoprost (XALATAN) 0.005 % ophthalmic solution Administer 1 Drop to both eyes nightly. multivitamin, tx-iron-ca-min (THERA-M w/ IRON) 9 mg iron-400 mcg tab tablet Take 1 Tab by mouth daily. acetaminophen (TYLENOL) 500 mg tablet Take 500 mg by mouth every six (6) hours as needed for Pain.      lancets misc Check blood glucose  daily. Patient would like One Touch Delica Plus Lancets 557 Each 3    glucose blood VI test strips (blood glucose test) strip Check blood glucose  daily. Patient would like One Touch Yashira test strips 100 Strip 3    metoprolol succinate (TOPROL-XL) 100 mg tablet Take 100 mg by mouth nightly. aspirin delayed-release 81 mg tablet Take 81 mg by mouth daily. co-enzyme Q-10 (CO Q-10) 100 mg capsule Take 100 mg by mouth daily.          Allergies  Allergies   Allergen Reactions    Atorvastatin Other (comments)     Muscle weakness    Heparin Other (comments)     thrombocytopenia    Heparin Analogues Other (comments)     thrombocytopenia    Medrol [Methylprednisolone] Other (comments)     Muscle weakness    Statins-Hmg-Coa Reductase Inhibitors Other (comments)    Zetia [Ezetimibe] Other (comments)     Muscle joint pain    Zocor [Simvastatin] Other (comments)     Muscle joint pain       Family History  Family History   Problem Relation Age of Onset    Heart Attack Father     Heart Disease Sister     Heart Attack Brother     Leukemia Brother     Anemia Brother        Social History  Social History     Socioeconomic History    Marital status:      Spouse name: Not on file    Number of children: Not on file    Years of education: Not on file    Highest education level: Not on file   Occupational History    Not on file   Tobacco Use    Smoking status: Former     Packs/day: 0.50     Years: 2.00     Pack years: 1.00     Types: Cigarettes     Quit date: 1970     Years since quittin.6    Smokeless tobacco: Never   Vaping Use    Vaping Use: Never used   Substance and Sexual Activity    Alcohol use: Yes     Alcohol/week: 1.0 standard drink     Types: 1 Shots of liquor per week     Comment: social    Drug use: Never    Sexual activity: Yes     Partners: Female   Other Topics Concern    Not on file   Social History Narrative    Not on file     Social Determinants of Health     Financial Resource Strain: Not on file   Food Insecurity: Not on file   Transportation Needs: Not on file   Physical Activity: Not on file   Stress: Not on file   Social Connections: Not on file   Intimate Partner Violence: Not on file   Housing Stability: Not on file       Review of Systems  Review of Systems - Review of all systems is negative except as noted above in the HPI.     Vital Signs  Visit Vitals  /66 (BP 1 Location: Left upper arm, BP Patient Position: Sitting, BP Cuff Size: Adult long)   Pulse 85   Temp 97.4 °F (36.3 °C) (Temporal)   Resp 20   Ht 5' 9\" (1.753 m)   Wt 230 lb (104.3 kg)   SpO2 97%   BMI 33.97 kg/m²         Physical Exam  Physical Examination: General appearance - alert, well appearing, and in no distress, oriented to person, place, and time, overweight, and acyanotic, in no respiratory distress  Mental status - alert, oriented to person, place, and time  Lymphatics - no palpable lymphadenopathy  Chest - clear to auscultation, no wheezes, rales or rhonchi, symmetric air entry  Heart - normal rate and regular rhythm  Abdomen - no rebound tenderness noted  Back exam - limited range of motion  Neurological - abnormal neurological exam unchanged from prior examinations  Musculoskeletal - osteoarthritic changes noted in both hands, bilateral knee discomfort to palpation, no erythema. Slight swelling. Extremities - intact peripheral pulses      Results  Results for orders placed or performed in visit on 04/19/22   AMB POC HEMOGLOBIN A1C   Result Value Ref Range    Hemoglobin A1c (POC) 7.2 %       ASSESSMENT and PLAN    ICD-10-CM ICD-9-CM    1. Chronic pain of right knee  M25.561 719.46 XR KNEE RT MIN 4 V    G89.29 338.29 REFERRAL TO ORTHOPEDICS      2. Chronic pain of left knee  M25.562 719.46 XR KNEE LT MIN 4 V    G89.29 338.29 REFERRAL TO ORTHOPEDICS      3. Type 2 diabetes mellitus with hyperglycemia, without long-term current use of insulin (HCC)  E11.65 250.00 AMB POC URINE, MICROALBUMIN, SEMIQUANTITATIVE     460.19 METABOLIC PANEL, COMPREHENSIVE      CBC WITH AUTOMATED DIFF      HEMOGLOBIN A1C WITH EAG      4. Paroxysmal atrial fibrillation (HCC)  I48.0 427.31       5. Coronary artery disease of bypass graft of native heart with stable angina pectoris (HCC)  I25.708 414.05      413.9       6. Dyslipidemia  E78.5 272.4 LIPID PANEL      7. Essential hypertension  I10 401.9       8. Encounter for immunization  Z23 V03.89 PNEUMOCOCCAL, PCV20, PREVNAR 20, (AGE 18 YRS+), IM, PF      SC IMMUNIZ ADMIN,1 SINGLE/COMB VAC/TOXOID      9.  Screening for malignant neoplasm of prostate  Z12.5 V76.44 PSA SCREENING (SCREENING)        lab results and schedule of future lab studies reviewed with patient  reviewed diet, exercise and weight control  cardiovascular risk and specific lipid/LDL goals reviewed  reviewed medications and side effects in detail  specific diabetic recommendations: low cholesterol diet, weight control and daily exercise discussed, home glucose monitoring emphasized, all medications, side effects and compliance discussed carefully, annual eye examinations at Ophthalmology discussed, glycohemoglobin and other lab monitoring discussed, and long term diabetic complications discussed      I have discussed the diagnosis with the patient and the intended plan of care as seen in the above orders. The patient has received an after-visit summary and questions were answered concerning future plans. I have discussed medication, side effects, and warnings with the patient in detail. The patient verbalized understanding and is in agreement with the plan of care. The patient will contact the office with any additional concerns. I spent at least 40 minutes on this visit with this established patient. Guadalupe Apley, MD    PLEASE NOTE:   This document has been produced using voice recognition software.  Unrecognized errors in transcription may be present

## 2022-08-01 NOTE — PROGRESS NOTES
1. \"Have you been to the ER, urgent care clinic since your last visit? Hospitalized since your last visit? \" No    2. \"Have you seen or consulted any other health care providers outside of the [address] [place] [address] since your last visit? \" No     3. For patients aged 39-70: Has the patient had a colonoscopy / FIT/ Cologuard? Yes - no Care Gap present      If the patient is female:    4. For patients aged 41-77: Has the patient had a mammogram within the past 2 years? NA - based on age or sex      11. For patients aged 21-65: Has the patient had a pap smear?  NA - based on age or sex

## 2022-08-01 NOTE — PROGRESS NOTES
After obtaining consent, and per orders of Dr. Miriam Leal, injection of Prevanar 20 was  given by Argelia Og. Patient instructed to remain in clinic for 20 minutes afterwards, and to report any adverse reaction to me immediately.

## 2022-08-02 LAB
AVG GLU, 10930: 171 MG/DL (ref 91–123)
CHOLEST SERPL-MCNC: 183 MG/DL (ref 110–200)
HBA1C MFR BLD HPLC: 7.6 % (ref 4.8–5.6)
HDLC SERPL-MCNC: 39 MG/DL
HDLC SERPL-MCNC: 4.7 MG/DL (ref 0–5)
LDL/HDL RATIO,LDHD: 2.5
LDLC SERPL CALC-MCNC: 96 MG/DL (ref 50–99)
NON-HDL CHOLESTEROL, 011976: 144 MG/DL
PSA SERPL-MCNC: 2.32 NG/ML
TRIGL SERPL-MCNC: 242 MG/DL (ref 40–149)
VLDLC SERPL CALC-MCNC: 48 MG/DL (ref 8–30)

## 2022-09-09 DIAGNOSIS — E11.65 TYPE 2 DIABETES MELLITUS WITH HYPERGLYCEMIA, WITHOUT LONG-TERM CURRENT USE OF INSULIN (HCC): ICD-10-CM

## 2022-09-09 DIAGNOSIS — E78.5 DYSLIPIDEMIA: ICD-10-CM

## 2022-09-09 DIAGNOSIS — Z12.5 SCREENING FOR MALIGNANT NEOPLASM OF PROSTATE: ICD-10-CM

## 2022-09-12 DIAGNOSIS — I25.708 CORONARY ARTERY DISEASE OF BYPASS GRAFT OF NATIVE HEART WITH STABLE ANGINA PECTORIS (HCC): ICD-10-CM

## 2022-09-12 DIAGNOSIS — E11.65 TYPE 2 DIABETES MELLITUS WITH HYPERGLYCEMIA, WITHOUT LONG-TERM CURRENT USE OF INSULIN (HCC): ICD-10-CM

## 2022-09-12 RX ORDER — ROSUVASTATIN CALCIUM 5 MG/1
TABLET, COATED ORAL
Qty: 90 TABLET | Refills: 3 | Status: SHIPPED | OUTPATIENT
Start: 2022-09-12

## 2022-09-12 RX ORDER — METFORMIN HYDROCHLORIDE 500 MG/1
TABLET, EXTENDED RELEASE ORAL
Qty: 180 TABLET | Refills: 3 | Status: SHIPPED | OUTPATIENT
Start: 2022-09-12

## 2022-10-18 RX ORDER — METOPROLOL SUCCINATE 100 MG/1
100 TABLET, EXTENDED RELEASE ORAL
Qty: 90 TABLET | Refills: 1 | Status: SHIPPED | OUTPATIENT
Start: 2022-10-18

## 2022-11-07 ENCOUNTER — OFFICE VISIT (OUTPATIENT)
Dept: ORTHOPEDIC SURGERY | Age: 67
End: 2022-11-07
Payer: MEDICARE

## 2022-11-07 VITALS — HEIGHT: 69 IN | WEIGHT: 236.2 LBS | BODY MASS INDEX: 34.98 KG/M2 | TEMPERATURE: 97.7 F

## 2022-11-07 DIAGNOSIS — Z96.652 HISTORY OF TOTAL KNEE ARTHROPLASTY, LEFT: ICD-10-CM

## 2022-11-07 DIAGNOSIS — G89.29 CHRONIC BILATERAL LOW BACK PAIN WITH BILATERAL SCIATICA: ICD-10-CM

## 2022-11-07 DIAGNOSIS — Z96.651 HISTORY OF TOTAL KNEE ARTHROPLASTY, RIGHT: ICD-10-CM

## 2022-11-07 DIAGNOSIS — M54.41 CHRONIC BILATERAL LOW BACK PAIN WITH BILATERAL SCIATICA: ICD-10-CM

## 2022-11-07 DIAGNOSIS — G89.29 CHRONIC PAIN OF BOTH KNEES: Primary | ICD-10-CM

## 2022-11-07 DIAGNOSIS — M54.42 CHRONIC BILATERAL LOW BACK PAIN WITH BILATERAL SCIATICA: ICD-10-CM

## 2022-11-07 DIAGNOSIS — M25.561 CHRONIC PAIN OF BOTH KNEES: Primary | ICD-10-CM

## 2022-11-07 DIAGNOSIS — M25.562 CHRONIC PAIN OF BOTH KNEES: Primary | ICD-10-CM

## 2022-11-07 PROCEDURE — 1123F ACP DISCUSS/DSCN MKR DOCD: CPT | Performed by: ORTHOPAEDIC SURGERY

## 2022-11-07 PROCEDURE — 99203 OFFICE O/P NEW LOW 30 MIN: CPT | Performed by: ORTHOPAEDIC SURGERY

## 2022-11-07 PROCEDURE — 73562 X-RAY EXAM OF KNEE 3: CPT | Performed by: ORTHOPAEDIC SURGERY

## 2022-11-07 RX ORDER — MOXIFLOXACIN 5 MG/ML
SOLUTION/ DROPS OPHTHALMIC
COMMUNITY
Start: 2022-09-27 | End: 2022-11-29

## 2022-11-07 RX ORDER — PREDNISOLONE ACETATE 10 MG/ML
SUSPENSION/ DROPS OPHTHALMIC
COMMUNITY
Start: 2022-09-27 | End: 2022-11-29

## 2022-11-07 NOTE — PROGRESS NOTES
Patient: Tree Fernandez                MRN: [id number]       SSN: [id number]  YOB: 1955        AGE: 75 y.o. SEX: male  Body mass index is 34.88 kg/m². PCP: Roni Meyers MD  11/07/22  Occupation: Retired from [employer]    ASSESSMENT & PLAN  Diagnosis: 67 y.o.male with bilateral knee pain after bilateral total knee arthroplasties this is related more towards lower back and deconditioning issues. We will refer him to my nonoperative spine colleagues for evaluation. I also send her to physical therapy to work on strengthening. There are no overt signs of infection even though the patient has had a history of staph infection in one of his knees prior to his total knee arthroplasty. There is no evidence of loosening either. There is a mild amount of play in the bilateral knees and he does have intact extensor mechanisms despite what looks like right patella avascular necrosis. I believe that any surgical treatment of his knees would only be for minimal gain and I discussed this with the patient. I would opt for addressing of his back pain as well as weakness as described above. I will see the patient on an as-needed basis    Plan:  Rest, ice, compression, elevation, OTC NSAIDs, Tylenol, Activity Modification, and Physical therapy    ICD-10-CM ICD-9-CM    1. Chronic pain of both knees  M25.561 719.46 AMB POC X-RAY KNEE 3 VIEW    M25.562 338.29 AMB POC X-RAY KNEE 3 VIEW    G89.29            IMAGING:  Imaging read by myself and interpreted as follows:  4 View Xray of the bilateral knees including AP, Lateral, Sunrise and Tunnel views show well-positioned bilateral total knee arthroplasties with no evidence of lucencies. There is patella Baha bilaterally and the right patella appears to have avascular necrosis with fragmentation.     CHIEF COMPLAINT:   Chief Complaint   Patient presents with    Knee Pain     Bilat         HPI: Tree Fernandez is a 75 y.o. male patient who is here as a new patient for evaluation of Knee Pain (Bilat/)  He had bilateral total knee arthroplasties with the left one performed in November 2018 in the right knee and they have 2017. Prior to that he had surgery for patellar dislocation of the right knee when he was in [employer] and an open meniscectomy on the left knee in the 80s. He has noticed the pain increasing in his had feelings of giving way from time to time. He has diabetic neuropathy in the bilateral lower extremities. He also feels like the knees are tight and stiff. He denies any fevers chills nausea vomiting or night sweats. He denies any redness or hotness to the knees. . It has been a problem for approximately a year. Symptoms: Mild pain and feelings of giving way, more so on the right than the left. Disrupted activities: Standing  Previous therapies: NSAIDs: Ibuprofen, Tylenol, and Topicals: Blue emu  Tobacco Use: Medium Risk    Smoking Tobacco Use: Former    Smokeless Tobacco Use: Never    Passive Exposure: Not on file     Key Anti-Platelet Anticoagulant Meds               aspirin delayed-release 81 mg tablet Take 81 mg by mouth daily. Past Medical History:   Diagnosis Date    Bypass graft mechanical complication (HCC)     quadropple    Diabetes ([facility]     Glaucoma     History of staph infection     knee    HTN (hypertension)     Hyperlipemia     SOB (shortness of breath)        Family History   Problem Relation Age of Onset    Heart Attack Father     Heart Disease Sister     Heart Attack Brother     Leukemia Brother     Anemia Brother        Current Outpatient Medications   Medication Sig Dispense Refill    prednisoLONE acetate (PRED FORTE) 1 % ophthalmic suspension       moxifloxacin (VIGAMOX) 0.5 % ophthalmic solution       metoprolol succinate (TOPROL-XL) 100 mg tablet Take 1 Tablet by mouth nightly.  90 Tablet 1    rosuvastatin (CRESTOR) 5 mg tablet TAKE 1 TABLET BY MOUTH AT  NIGHT 90 Tablet 3    metFORMIN ER (GLUCOPHAGE XR) 500 mg tablet TAKE 1 TABLET BY MOUTH  TWICE DAILY WITH MEALS 180 Tablet 3    colesevelam (WELCHOL) 625 mg tablet TAKE 2 TABS BY MOUTH DAILY. 180 Tablet 1    isosorbide mononitrate ER (IMDUR) 30 mg tablet TAKE 1 TABLET BY MOUTH  DAILY 90 Tablet 3    lisinopriL (PRINIVIL, ZESTRIL) 5 mg tablet Take 5 mg by mouth daily. latanoprost (XALATAN) 0.005 % ophthalmic solution Administer 1 Drop to both eyes nightly. multivitamin, tx-iron-ca-min (THERA-M w/ IRON) 9 mg iron-400 mcg tab tablet Take 1 Tab by mouth daily. acetaminophen (TYLENOL) 500 mg tablet Take 500 mg by mouth every six (6) hours as needed for Pain.      lancets misc Check blood glucose  daily. Patient would like One Touch Delica Plus Lancets 680 Each 3    glucose blood VI test strips (blood glucose test) strip Check blood glucose  daily. Patient would like One Touch Yashira test strips 100 Strip 3    aspirin delayed-release 81 mg tablet Take 81 mg by mouth daily. co-enzyme Q-10 (CO Q-10) 100 mg capsule Take 100 mg by mouth daily.          Allergies   Allergen Reactions    Atorvastatin Other (comments)     Muscle weakness    Heparin Other (comments)     thrombocytopenia    Heparin Analogues Other (comments)     thrombocytopenia    Medrol [Methylprednisolone] Other (comments)     Muscle weakness    Statins-Hmg-Coa Reductase Inhibitors Other (comments)    Zetia [Ezetimibe] Other (comments)     Muscle joint pain    Zocor [Simvastatin] Other (comments)     Muscle joint pain       Past Surgical History:   Procedure Laterality Date    COLONOSCOPY N/A 3/26/2021    COLONOSCOPY performed by Abdoul Acevedo MD at [facility]    HX COLONOSCOPY  2010    normal    HX CORONARY ARTERY BYPASS GRAFT  2019    HX CYST REMOVAL      from back    HX HERNIA REPAIR Bilateral     HX KNEE REPLACEMENT  2017 and 2018    rigjt and left replacement        Social History     Socioeconomic History    Marital status:      Spouse name: Not on file Number of children: Not on file    Years of education: Not on file    Highest education level: Not on file   Occupational History    Not on file   Tobacco Use    Smoking status: Former     Packs/day: 0.50     Years: 2.00     Pack years: 1.00     Types: Cigarettes     Quit date: 5     Years since quittin.8    Smokeless tobacco: Never   Vaping Use    Vaping Use: Never used   Substance and Sexual Activity    Alcohol use: Yes     Alcohol/week: 1.0 standard drink     Types: 1 Shots of liquor per week     Comment: social    Drug use: Never    Sexual activity: Yes     Partners: Female   Other Topics Concern    Not on file   Social History Narrative    Not on file     Social Determinants of Health     Financial Resource Strain: Not on file   Food Insecurity: Not on file   Transportation Needs: Not on file   Physical Activity: Unknown    Days of Exercise per Week: 0 days    Minutes of Exercise per Session: Not on file   Stress: Not on file   Social Connections: Not on file   Intimate Partner Violence: Not At Risk    Fear of Current or Ex-Partner: No    Emotionally Abused: No    Physically Abused: No    Sexually Abused: No   Housing Stability: Not on file       REVIEW OF SYSTEMS:    14 point review of systems on the intake form is negative except as noted in the HPI    PHYSICAL EXAMINATION:  Vitals:    22 1106   Temp: 97.7 °F (36.5 °C)   TempSrc: Temporal   Weight: 236 lb 3.2 oz (107.1 kg)   Height: 5' 9\" (1.753 m)   PainSc:   3   PainLoc: Knee     Body mass index is 34.88 kg/m². GENERAL: Alert and oriented x3, in no acute distress, well-developed, well-nourished. HEENT: Normocephalic, atraumatic. MSK:  Right Knee Exam     Tenderness   The patient is experiencing no tenderness.      Range of Motion   Extension:  0   Flexion:  120     Tests   Varus: negative Valgus: negative    Other   Erythema: absent  Sensation: normal  Pulse: present  Swelling: mild      Left Knee Exam     Muscle Strength   The patient has normal left knee strength. Tenderness   The patient is experiencing no tenderness. Range of Motion   Extension:  0   Flexion:  110     Tests   Varus: negative Valgus: negative    Other   Erythema: absent  Sensation: normal  Pulse: present  Swelling: mild    Comments:  Mild varus valgus play in the bilateral knees in full extension. 5 to 10 mm of excursion with drawer testing of the left knee and approximate 10 mm with drawer testing on the right knee. Prescription medication management discussed. Thank you for the opportunity to treat Lluvia Lundberg     Electronically signed by: Jodi Armstrong DO    Note: This note was completed using voice recognition software.   Any typographical/name errors or mistakes are unintentional.

## 2022-11-20 NOTE — PROGRESS NOTES
1. Have you been to the ER, urgent care clinic since your last visit? Hospitalized since your last visit? no    2. Have you seen or consulted any other health care providers outside of the [address] [place] [address] since your last visit? Include any pap smears or colon screening.  Yes vv pcp No

## 2022-11-22 RX ORDER — COLESEVELAM 180 1/1
TABLET ORAL
Qty: 180 TABLET | Refills: 1 | Status: SHIPPED | OUTPATIENT
Start: 2022-11-22

## 2022-11-28 ENCOUNTER — HOSPITAL ENCOUNTER (OUTPATIENT)
Dept: PHYSICAL THERAPY | Age: 67
Discharge: HOME | End: 2022-11-28
Attending: ORTHOPAEDIC SURGERY
Payer: MEDICARE

## 2022-11-28 PROCEDURE — 97162 PT EVAL MOD COMPLEX 30 MIN: CPT | Performed by: PHYSICAL THERAPIST

## 2022-11-28 PROCEDURE — 97110 THERAPEUTIC EXERCISES: CPT | Performed by: PHYSICAL THERAPIST

## 2022-11-28 NOTE — THERAPY EVALUATION
[facility]              [address]        [address], [address] [place]   [phone] fax    Plan of Care/ Statement of Necessity for Physical Therapy Services    Patient name: Qian Todd Start of Care: 2022   Referral source: Sachin Gandhi  : 1955    Medical Diagnosis: Left knee pain [M25.562]  Right knee pain [M25.561]  Payor: [employer] / Plan: [facility] [employer] MEDICARE COMPLETE / Product Type: Managed Care Medicare /  Onset Date:2022    Treatment Diagnosis: Right Knee Pain; Left Knee Pain   Prior Hospitalization: see medical history Provider#: [id number]   Medications: Verified on Patient summary List    Comorbidities: Back pain, BMI 34.0, Diabetes, PVD, Prior surgery, Visual Impairment. Prior Level of Function: Retired, works with [employer] for [address]. The Plan of Care and following information is based on the information from the initial evaluation. Assessment/ key information: Patient with signs and symptoms consistent with bilateral knee pain and stiffness. Patient has a h/o multiple surgeries including right TKA 2017 and left TKA 2018. His CC is stiffness and occasional cramping pains with activity. He has good AROM but more limited in the left than the right. MMT is WNL for strength of his quads and hamstrings. Gait is without deviation. Funcitonally, he note his knee has been giving out but he denies any falls. Patient will benefit from a program of skilled physical therapy to include therapeutic exercises to address strength deficits, therapeutic activities to improve functional mobility, neuromuscular reeducation to address balance, coordination and proprioception, manual therapy to address ROM and tissue extensibility and modalities as indicated. All questions were answered.     Evaluation Complexity History MEDIUM  Complexity : 1-2 comorbidities / personal factors will impact the outcome/ POC ; Examination MEDIUM Complexity : 3 Standardized tests and measures addressing body structure, function, activity limitation and / or participation in recreation  ;Presentation MEDIUM Complexity : Evolving with changing characteristics  ; Clinical Decision Making MEDIUM Complexity : FOTO score of 26-74  Overall Complexity Rating: MEDIUM  Problem List: pain affecting function, decrease ROM, decrease activity tolerance, and decrease flexibility/ joint mobility   Treatment Plan may include any combination of the following: Therapeutic exercise, Neuromuscular reeducation, Manual therapy, Therapeutic activity, Self care/home management, and Electric stim unattended   Patient / Family readiness to learn indicated by: asking questions, trying to perform skills, and interest  Persons(s) to be included in education: patient (P)  Barriers to Learning/Limitations: None  Patient Goal (s): I am hoping I get my knees for flexible to eliminate the stiffness  Patient Self Reported Health Status: fair  Rehabilitation Potential: good    Short Term Goals: To be accomplished in 1-2 treatments:  1. Patient will become proficient in their HEP and will be compliant in performing that program.  Evaluation:   Patient given a written/illustrated HEP. Long Term Goals: To be accomplished in 12 treatments:  1. Patient's pain level will be 0/10-3/10 with activity in order to improve patient's ability to perform normal ADLs. Evaluation:  0/10-8/10  2. Patient will demonstrate 0-110 degrees AROM left knee to increase ease of ADLs. Evaluation:  AROM left knee 0-102 degrees. 3. Patient will increase FOTO score to  65 to indicate increased functional mobility. Evaluation:  61  4. Patient will report little to no difficulty performing heavy activities around his home in order to increased his normal ADLs. .  Evaluation:  Notes moderate difficulty.     Frequency / Duration: Patient to be seen 2-3 times per week for 12 treatments. Patient/ Caregiver education and instruction: Diagnosis, prognosis, exercises   [x]  Plan of care has been reviewed with PTA      Certification Period: 11/28/2022-12/27/2022  Mirta Leija, PT 11/28/2022 7:49 AM  ________________________________________________________________________    I certify that the above Therapy Services are being furnished while the patient is under my care. I agree with the treatment plan and certify that this therapy is necessary.     [de-identified] Signature:____________Date:_________TIME:________     Gabrielle Karimi DO  ** Signature, Date and Time must be completed for valid certification **  Please sign and return to [facility] - [address], [address] [place]   [phone] fax

## 2022-11-28 NOTE — THERAPY EVALUATION
PT DAILY TREATMENT NOTE/KNEE EVAL     Patient Name: Vinny Hooks  Date:2022  : 1955  [x]  Patient  Verified  Payor: [facility] MEDICARE COMPLETE / [facility] / Product Type: Managed Care Medicare /    In time:11:45  Out time:12:30  Total Treatment Time (min): 45  Visit #: 1 of 12    Medicare/BCBS Only   Total Timed Codes (min):  23 1:1 Treatment Time:  45     Treatment Area: Left knee pain [M25.562]  Right knee pain [M25.561]    SUBJECTIVE  Pain Level (0-10 scale): 2-3/10 now; 0/10 at best; 8/10 at worst.  []constant [x]intermittent []improving []worsening []no change since onset    Any medication changes, allergies to medications, adverse drug reactions, diagnosis change, or new procedure performed?: [x] No    [] Yes (see summary sheet for update)  Subjective functional status/changes:     PLOF: Retired, works with [employer] for [employer]. Limitations to PLOF: Knee has been giving out. Not currently building a home with [employer]. Mechanism of Injury: bilateral TKA and gradual onset of knee symptoms. Current symptoms/Complaints: His CC is stiffness in both knees and that his knees have been giving out. He has not fallen. Feels a tightness like \"clamps\" on his knees. Notes it takes some time to get going in the morning. Previous Treatment/Compliance: None  PMHx/Surgical Hx: Left TKA 2018; Right TKA 2017. Diabetic neuropathy bilateral LE  Work Hx: Retired  Living Situation: Split level home with steps. Pt Goals: \"I am hoping I get my knees for flexible to eliminate the stiffness\". Barriers: [x]pain []financial []time []transportation []other  Cognition: A & O x 3    Other:    OBJECTIVE/EXAMINATION  Domestic Life: Lives with his wife  Activity/Recreational Limitations: None  Mobility: ambulates FWB, no AD. Self Care: Independent.       22 min [x]Eval                  []Re-Eval       23 min Therapeutic Exercise:  [] See flow sheet :   Rationale: increase ROM and flexibility  to improve the patients ability to increase patients ADLs.             With   [] TE   [] TA   [] neuro   [] other: Patient Education: [x] Review HEP    [] Progressed/Changed HEP based on:   [] positioning   [] body mechanics   [] transfers   [] heat/ice application    [] other:      Other Objective/Functional Measures:     Physical Therapy Evaluation - Knee    Posture: [] Varus    [x] Valgus    [] Recurvatum        [] Tibial Torsion    [] Foot Supination    [] Foot Pronation    Describe:    Gait:  [x] Normal    [] Abnormal    [] Antalgic    [] NWB    Device:    Describe:    ROM / Strength  [] Unable to assess                  AROM                      PROM                   Strength (1-5)    Left Right Left Right Left Right   Hip Flexion   [facility]      Extension   [facility] WFL      Abduction   WFL WFL      Adduction   WFL WFL     Knee Flexion 102 114 105 p! 120 p! 5 5    Extension 0 0 0 0 5 5                           Flexibility: [] Unable to assess at this time  Hamstrings:    (L) Tightness= [] WNL   [x] Min   [] Mod   [] Severe    (R) Tightness= [] WNL   [x] Min   [] Mod   [] Severe  Quadriceps:    (L) Tightness= [] WNL   [] Min   [x] Mod   [] Severe    (R) Tightness= [] WNL   [] Min   [x] Mod   [] Severe  Gastroc:      (L) Tightness= [] WNL   [x] Min   [] Mod   [] Severe    (R) Tightness= [] WNL   [x] Min   [] Mod   [] Severe  Other:    Palpation: Right Knee   Neg/Pos  Neg/Pos  Neg/Pos   Joint Line(Med/Lat) (-) Quad tendon (-) Patellar tendon (-)   Patella (-) Gastrocnemius(Med/Lat) (-) Pes Anserinus (-)   Popliteal Fossa (-) Hamstring tendons(Med/Lat) (+) medial MCL/LCL (-)     Left Knee   Neg/Pos  Neg/Pos  Neg/Pos   Joint Line(Med/Lat) (-) Quad tendon (-) Patellar tendon (+)   Patella (-) Gastrocnemius(Med/Lat) (-) Pes Anserinus (-)   Popliteal Fossa (-) Hamstring tendons(Med/Lat) (+) medial MCL/LCL (-)     Optional Tests:  Patellar Positioning (Static)   [x]L []R Normal []L []R Lateral   []L []R Youlanda Aver      []L []R Medial   [x]L []R SOJOURN AT ZEB    Patellar Tracking   []L []R Glide (Lat)   []L []R Tilt (Lat)     []L []R Glide (Med)  []L []R Tilt (Med)      []L []R Tile (Inf)     Patellar Mobility   []L [x]R Hypermobile [x]L []R Hypomobile           Right Knee:  Valgus@ 0 Degrees [x] Neg    [] Pos   Valgus@ 30 Degrees [x] Neg    [] Pos   Varus@ 0 Degrees [x] Neg    [] Pos   Varus@ 30 Degrees [x] Neg    [] Pos   Ely's Test  [x] Neg    [] Pos    Left knee:  Valgus@ 0 Degrees [x] Neg    [] Pos   Valgus@ 30 Degrees [x] Neg    [] Pos   Varus@ 0 Degrees [x] Neg    [] Pos   Varus@ 30 Degrees [x] Neg    [] Pos   Ely's Test  [x] Neg    [] Pos              Other tests/comments: Right knee patella has a different feel than the left. It is more mobil and does not seem to have as much substance to it. Pain Level (0-10 scale) post treatment: 0/10 but does have stiffness    ASSESSMENT/Changes in Function: Patient with signs and symptoms consistent with bilateral knee pain and stiffness. Patient has a h/o multiple surgeries including right TKA 2017 and left TKA 2018. His CC is stiffness and occasional cramping pains with activity. He has good AROM but more limited in the left than the right. MMT is WNL for strength of his quads and hamstrings. Gait is without deviation. Funcitonally, he note his knee has been giving out but he denies any falls. Patient will continue to benefit from skilled PT services to modify and progress therapeutic interventions, address functional mobility deficits, address ROM deficits, analyze and address soft tissue restrictions, analyze and cue movement patterns, and analyze and modify body mechanics/ergonomics to attain remaining goals. [x]  See Plan of Care  []  See progress note/recertification  []  See Discharge Summary         Progress towards goals / Updated goals:  Short Term Goals: To be accomplished in 1-2 treatments:  1.   Patient will become proficient in their HEP and will be compliant in performing that program.  Evaluation:   Patient given a written/illustrated HEP. Long Term Goals: To be accomplished in 12 treatments:  1. Patient's pain level will be 0/10-3/10 with activity in order to improve patient's ability to perform normal ADLs. Evaluation:  0/10-8/10  2. Patient will demonstrate 0-110 degrees AROM left knee to increase ease of ADLs. Evaluation:  AROM left knee 0-102 degrees. 3. Patient will increase FOTO score to  65 to indicate increased functional mobility. Evaluation:  61  4. Patient will report little to no difficulty performing heavy activities around his home in order to increased his normal ADLs. .  Evaluation:  Notes moderate difficulty.     PLAN  [x]  Upgrade activities as tolerated     [x]  Continue plan of care  []  Update interventions per flow sheet       []  Discharge due to:_  []  Other:_      Barb De Leon, PT 11/28/2022  7:53 AM

## 2022-11-29 ENCOUNTER — OFFICE VISIT (OUTPATIENT)
Dept: CARDIOLOGY CLINIC | Age: 67
End: 2022-11-29
Payer: MEDICARE

## 2022-11-29 VITALS
HEART RATE: 86 BPM | BODY MASS INDEX: 35.1 KG/M2 | HEIGHT: 69 IN | WEIGHT: 237 LBS | OXYGEN SATURATION: 96 % | SYSTOLIC BLOOD PRESSURE: 109 MMHG | DIASTOLIC BLOOD PRESSURE: 68 MMHG

## 2022-11-29 DIAGNOSIS — Z95.1 HX OF CABG: ICD-10-CM

## 2022-11-29 DIAGNOSIS — I25.10 CORONARY ARTERY DISEASE INVOLVING NATIVE CORONARY ARTERY OF NATIVE HEART WITHOUT ANGINA PECTORIS: Primary | ICD-10-CM

## 2022-11-29 DIAGNOSIS — E78.5 HYPERLIPIDEMIA, UNSPECIFIED HYPERLIPIDEMIA TYPE: ICD-10-CM

## 2022-11-29 DIAGNOSIS — I48.0 PAROXYSMAL ATRIAL FIBRILLATION (HCC): ICD-10-CM

## 2022-11-29 PROCEDURE — G8427 DOCREV CUR MEDS BY ELIG CLIN: HCPCS | Performed by: INTERNAL MEDICINE

## 2022-11-29 PROCEDURE — G8417 CALC BMI ABV UP PARAM F/U: HCPCS | Performed by: INTERNAL MEDICINE

## 2022-11-29 PROCEDURE — 3017F COLORECTAL CA SCREEN DOC REV: CPT | Performed by: INTERNAL MEDICINE

## 2022-11-29 PROCEDURE — G8752 SYS BP LESS 140: HCPCS | Performed by: INTERNAL MEDICINE

## 2022-11-29 PROCEDURE — 1123F ACP DISCUSS/DSCN MKR DOCD: CPT | Performed by: INTERNAL MEDICINE

## 2022-11-29 PROCEDURE — 1101F PT FALLS ASSESS-DOCD LE1/YR: CPT | Performed by: INTERNAL MEDICINE

## 2022-11-29 PROCEDURE — G8536 NO DOC ELDER MAL SCRN: HCPCS | Performed by: INTERNAL MEDICINE

## 2022-11-29 PROCEDURE — 99214 OFFICE O/P EST MOD 30 MIN: CPT | Performed by: INTERNAL MEDICINE

## 2022-11-29 PROCEDURE — G8432 DEP SCR NOT DOC, RNG: HCPCS | Performed by: INTERNAL MEDICINE

## 2022-11-29 PROCEDURE — G8754 DIAS BP LESS 90: HCPCS | Performed by: INTERNAL MEDICINE

## 2022-11-29 NOTE — PROGRESS NOTES
1. Have you been to the ER, urgent care clinic since your last visit? Hospitalized since your last visit? No    2. Have you seen or consulted any other health care providers outside of the [address] [place] [address] since your last visit? Include any pap smears or colon screening.  Yes Where: Eye doctor

## 2022-11-29 NOTE — PROGRESS NOTES
HISTORY OF PRESENT ILLNESS  Deyanira Dhillon is a 75 y.o. male. 2/12/2021  Patient seen today for new patient evaluation he has history of CAD, paroxysmal A. fib prior CABG and hyperlipidemia. Patient is recovering of increasing shortness of breath. Exertional shortness of breath is persistent since CABG    Patient has increased shortness of breath on exertion happens with climbing stairs his activity. Occasional chest pain which are short lasting left-sided not related activity or exertion. Denies orthopnea PND no peripheral edema. Patient had prior PCI's and subsequent CABG in December 2019. He had a complex course requiring reopening of the chest.  Postoperatively he had atrial fibrillation with no recurrence after that. His anticoagulation has been discontinued since then. 9/2021  Patient is here for follow-up. Patient has again noticed increasing shortness of breath. Happens on minimal activity exertion. Also complain of left-sided dull pain not related to activity exertion lasting less than a minute at rest.    Follow-up  Associated symptoms include shortness of breath. Pertinent negatives include no chest pain, no abdominal pain and no headaches. Review of Systems   Constitutional:  Negative for chills and fever. HENT:  Negative for nosebleeds. Eyes:  Negative for blurred vision and double vision. Respiratory:  Positive for shortness of breath. Negative for cough, hemoptysis, sputum production and wheezing. Cardiovascular:  Negative for chest pain, palpitations, orthopnea, claudication, leg swelling and PND. Gastrointestinal:  Negative for abdominal pain, heartburn, nausea and vomiting. Musculoskeletal:  Negative for myalgias. Skin:  Negative for rash. Neurological:  Negative for dizziness, weakness and headaches. Endo/Heme/Allergies:  Does not bruise/bleed easily.    Family History   Problem Relation Age of Onset    Heart Attack Father     Heart Disease Sister     Heart Attack Brother     Leukemia Brother     Anemia Brother        Past Medical History:   Diagnosis Date    Bypass graft mechanical complication ([facility] [address]     quadropple    Diabetes ([facility] [address]     Glaucoma     History of staph infection     knee    HTN (hypertension)     Hyperlipemia     SOB (shortness of breath)        Past Surgical History:   Procedure Laterality Date    COLONOSCOPY N/A 3/26/2021    COLONOSCOPY performed by Serina Hernandez MD at [facility]    HX COLONOSCOPY      normal    HX CORONARY ARTERY BYPASS GRAFT      HX CYST REMOVAL      from back    HX HERNIA REPAIR Bilateral     HX KNEE REPLACEMENT   and 2018    rigjt and left replacement        Social History     Tobacco Use    Smoking status: Former     Packs/day: 0.50     Years: 2.00     Pack years: 1.00     Types: Cigarettes     Quit date: 1970     Years since quittin.9    Smokeless tobacco: Never   Substance Use Topics    Alcohol use: Yes     Alcohol/week: 1.0 standard drink     Types: 1 Shots of liquor per week     Comment: social       Allergies   Allergen Reactions    Atorvastatin Other (comments)     Muscle weakness    Heparin Other (comments)     thrombocytopenia    Heparin Analogues Other (comments)     thrombocytopenia    Medrol [Methylprednisolone] Other (comments)     Muscle weakness    Statins-Hmg-Coa Reductase Inhibitors Other (comments)    Zetia [Ezetimibe] Other (comments)     Muscle joint pain    Zocor [Simvastatin] Other (comments)     Muscle joint pain       Prior to Admission medications    Medication Sig Start Date End Date Taking? Authorizing Provider   colesevelam (WELCHOL) 625 mg tablet TAKE 2 TABLETS BY MOUTH EVERY DAY 22  Yes Malena Grider MD   metoprolol succinate (TOPROL-XL) 100 mg tablet Take 1 Tablet by mouth nightly.  10/18/22  Yes Kiko Purdy NP   rosuvastatin (CRESTOR) 5 mg tablet TAKE 1 TABLET BY MOUTH AT  NIGHT 22  Yes Malena Grider MD   metFORMIN ER (GLUCOPHAGE XR) 500 mg tablet TAKE 1 TABLET BY MOUTH  TWICE DAILY WITH MEALS 9/12/22  Yes Malena Guerrier MD   isosorbide mononitrate ER (IMDUR) 30 mg tablet TAKE 1 TABLET BY MOUTH  DAILY 1/13/22  Yes Kiko Purdy NP   lisinopriL (PRINIVIL, ZESTRIL) 5 mg tablet Take 5 mg by mouth daily. Yes Provider, Historical   latanoprost (XALATAN) 0.005 % ophthalmic solution Administer 1 Drop to both eyes nightly. Yes Provider, Historical   multivitamin, tx-iron-ca-min (THERA-M w/ IRON) 9 mg iron-400 mcg tab tablet Take 1 Tab by mouth daily. Yes Provider, Historical   acetaminophen (TYLENOL) 500 mg tablet Take 500 mg by mouth every six (6) hours as needed for Pain. Yes Provider, Historical   lancets misc Check blood glucose  daily. Patient would like One Touch Delica Plus Lancets 1/78/44  Yes Kiet Lima MD   glucose blood VI test strips (blood glucose test) strip Check blood glucose  daily. Patient would like One Touch Yashira test strips 4/14/20  Yes Kiet Lima MD   aspirin delayed-release 81 mg tablet Take 81 mg by mouth daily. Yes Provider, Historical   co-enzyme Q-10 (CO Q-10) 100 mg capsule Take 100 mg by mouth daily. Yes Provider, Historical         Visit Vitals  /68 (BP 1 Location: Left upper arm, BP Patient Position: Sitting, BP Cuff Size: Adult)   Pulse 86   Ht 5' 9\" (1.753 m)   Wt 107.5 kg (237 lb)   SpO2 96%   BMI 35.00 kg/m²         Physical Exam  Constitutional:       Appearance: He is well-developed. HENT:      Head: Normocephalic and atraumatic. Eyes:      Conjunctiva/sclera: Conjunctivae normal.   Neck:      Thyroid: No thyromegaly. Vascular: No JVD. Trachea: No tracheal deviation. Cardiovascular:      Rate and Rhythm: Normal rate and regular rhythm. Heart sounds: Normal heart sounds. No murmur heard. No friction rub. No gallop. Pulmonary:      Effort: No respiratory distress. Breath sounds: Normal breath sounds. No wheezing or rales. Chest:      Chest wall: No tenderness. Abdominal:      Palpations: Abdomen is soft. Tenderness: There is no abdominal tenderness. Musculoskeletal:      Cervical back: Neck supple. Skin:     General: Skin is warm and dry. Neurological:      Mental Status: He is alert and oriented to person, place, and time. Mr. Mar Chi has a reminder for a \"due or due soon\" health maintenance. I have asked that he contact his primary care provider for follow-up on this health maintenance. No flowsheet data found. I have personally reviewed patient's records available from hospital and other providers and incorporated findings in patient care. Notes, lab, ED EKG, chest x-ray, stress test  CORONARY ANGIOGRAPHY:   LM:  Large caliber vessel with no significant disease. LAD: Large caliber vessel with three diagonal branches and small   septal  vessels with a 60% tubular stenosis within a   previously placed stent in the proximal LAD. The mid LAD has a   50% tubular stenosis. The remainder of the LAD has mild luminal   irregularities. There are left to right collaterals arising from   the septal perforators. --1st Diagonal: Small caliber vessel with no significant disease. --2nd Diagonal: Small caliber vessel with mild diffuse disease. LEFT CX:  Large caliber, co-dominant vessel with 80% ostial   stenosis.   --OM1: Moderate caliber vessel with a 50% tubular eccentric   stenosis in the mid-segment. Janice Rast --AV groove Cx: Small caliber vessel with mild diffuse disease. RAMUS: Large caliber vessel with 50% proximal stenosis followed   by a 90% focal stenosis in the proximal to mid segment. RCA: The right coronary artery is a moderate to large caliber,   co-dominant vessel with a 80% focal stenosis in the proximal   segment. The mid-segment has an aneurysmal segment followed by a   30% focal stenosis. --PDA: Moderate caliber vessel with no significant disease. --CAROLYNE: Moderate caliber vessel with 30% tubular stenosis in the   mid-segment. IMPRESSION:  1. Multivessel Coronary Artery Disease. 2. Normal left ventricular end diastolic pressure. 12/2019  OPERATION PERFORMED:  Coronary artery bypass grafting x4 with 1 artery and 3 veins, left internal mammary artery to left anterior descending, reversed greater saphenous vein to the right coronary, and reversed left greater saphenous vein as a sequential to the circ 1 (ramus) and circ 2, left endoscopic vein harvest, and SPY angiography. ECHO LIMITED STUDY (W/O COLOR)3/13/2020  [employer]  Component Name Value Ref Range   EF Echo 53     Result Impression   :   HYPOKINESIS OF THE BASAL-MID INFERIOR, BASAL-MID INFEROLATERAL AND BASAL INFEROSEPTAL WALLS   WITH PRESERVED GLOBAL LEFT VENTRICULAR SYSTOLIC FUNCTION WITH A CALCULATED BI-PLANE OF 53%. MILDLY DILATED RIGHT VENTRICULAR SIZE WITH REDUCED FUNCTION. INDETERMINATE DIASTOLIC FUNCTION. MILD LEFT VENTRICULAR HYPERTROPHY PRESENT.   BOWING OF THE INTERATRIAL SEPTUM TOWARDS THE LEFT. NO EVIDENCE OF TRICUSPID REGURGITATION. ALEXANDRU Rest/Stress Multiple Spect3/13/2020  [employer]  Component Name Value Ref Range   EF Nuc 65     Result Impression    THIS MYOCARDIAL PERFUSION STUDY IS NORMAL   THIS IS A LOW RISK STUDY   MYOCARDIAL PERFUSION IMAGING IS NORMAL. NO ABNORMALITIES OR EVIDENCE OF ISCHEMIA OR PRIOR INFARCTION. THE CALCULATED LV EJECTION FRACTION WAS 65%. OVERALL LEFT VENTRICULAR SYSTOLIC FUNCTION WAS NORMAL WITHOUT REGIONAL WALL MOTION   ABNORMALITIES. IMPRESSION nuclear stress test-7/2020  IMPRESSION:  1. No evidence of reversible myocardial ischemia. 2. This is a low risk study. Interpretation Summary 2/2021    LV: Estimated LVEF is 55 - 60%. Normal cavity size and systolic function (ejection fraction normal). Mild concentric hypertrophy. Mild hypokinesis of the basal inferoseptal, basal inferior and basal anteroseptal wall(s). Mild (grade 1) left ventricular diastolic dysfunction.   RV: Mildly dilated right ventricle. Mildly reduced systolic function. RA: Mildly dilated right atrium. PA: Pulmonary arterial systolic pressure is 20 mmHg. AO: Mild aortic root dilatation (3.9 cm). IMPRESSION ct 10/2021     No acute findings. Hepatic steatosis. Moderate colonic stool burden. FINDINGS: ultrasound 5/2022  Proximal abdominal aorta measures approximately 2.2 x 2.1 cm. Mid abdominal aorta measures 2.1 x 2 cm diameter. Distal abdominal aorta appears mildly ectatic, measuring up to 2.2 x 2.2 cm  diameter. Right common iliac artery 1.3 cm diameter. Left common iliac artery 1.3 cm diameter. Several focal areas of aortic wall calcification throughout the abdominal aorta  consistent with atherosclerotic disease. Additional calcifications at the level  of the iliac vessels. Visualized portion of the vena cava grossly unremarkable. IMPRESSION  1. Nonaneurysmal but mildly ectatic abdominal aorta with scattered areas of  atherosclerotic wall calcification as above. Assessment         ICD-10-CM ICD-9-CM    1. Coronary artery disease involving native coronary artery of native heart without angina pectoris  I25.10 414.01     Stable continue treatment monitor      2. Hx of CABG  Z95.1 V45.81     Stable      3. Hyperlipidemia, unspecified hyperlipidemia type  E78.5 272.4     Continue current treatment lab with PCP      4. Paroxysmal atrial fibrillation (HCC)  I48.0 427.31     Postop A. fib no recurrence monitor      2/2021  Seen for evaluation of CAD, multiple old stents subsequent CABG. Had loop recorder for apparent syncopal episode in the hospital.  Patient had postop A. fib. And multiple other problems. Extensive chart review including prior bypass surgeries cardiac catheterization and lab reviewed. Patient had wall motion abnormality on prior echo so we will follow-up to make sure LV dysfunction has not occurred.   If we see that there is significant LV dysfunction consider cardiac catheterization with possible angina equivalent. Patient is on multiple medical management. Recent increase in rosuvastatin was done for elevated LDL. Follow-up lab. If echocardiogram shows no significant change or LV dysfunction consider pulmonary function test once labs open up. This would to assess for any restriction post operatively  no concern of CHF/diuretic as needed  3/2021  Echocardiogram exam with normal ejection fraction no pulmonary hypertension. Symptoms are significantly improved after stopping Lasix. As he does not have any angina and negative nuclear stress test we will also hold Ranexa. Any other treatment. We will hold off on PFT at present  9/2021  Continues to be short of breath. We will follow with PFT as well as follow-up CT for lung nodule that he had before. 5/2022  Cardiac status stable. CT scan unremarkable. Abdominal ultrasound with no aneurysm. Patient has a loop recorder that was implanted in [place] about 3-1/2 years ago battery is dead now. At present we will leave it in. Shortness of breath are stable in view of that we will not pursue PFT at present. Patient is intolerant to higher dose of statin currently able to tolerate rosuvastatin 5 mg a day. Postop A. fib no recurrence  11/2022  Cardiac status stable continue current medical management monitor. Last lab with high triglyceride continue dietary modification diabetes control. LDL in 90s currently on very low-dose of rosuvastatin. If LDL increases consider use of PCSK9 inhibitor. Medications Discontinued During This Encounter   Medication Reason    prednisoLONE acetate (PRED FORTE) 1 % ophthalmic suspension Not A Current Medication    moxifloxacin (VIGAMOX) 0.5 % ophthalmic solution Not A Current Medication       No orders of the defined types were placed in this encounter. Follow-up and Dispositions    Return in about 6 months (around 5/29/2023).

## 2022-12-01 ENCOUNTER — OFFICE VISIT (OUTPATIENT)
Dept: FAMILY MEDICINE CLINIC | Age: 67
End: 2022-12-01

## 2022-12-01 VITALS
HEIGHT: 69 IN | RESPIRATION RATE: 20 BRPM | DIASTOLIC BLOOD PRESSURE: 73 MMHG | WEIGHT: 234 LBS | SYSTOLIC BLOOD PRESSURE: 117 MMHG | HEART RATE: 71 BPM | TEMPERATURE: 97.8 F | OXYGEN SATURATION: 93 % | BODY MASS INDEX: 34.66 KG/M2

## 2022-12-01 DIAGNOSIS — I49.9 CARDIAC ARRHYTHMIA, UNSPECIFIED CARDIAC ARRHYTHMIA TYPE: ICD-10-CM

## 2022-12-01 DIAGNOSIS — M25.562 CHRONIC PAIN OF BOTH KNEES: ICD-10-CM

## 2022-12-01 DIAGNOSIS — G89.29 CHRONIC PAIN OF BOTH KNEES: ICD-10-CM

## 2022-12-01 DIAGNOSIS — E66.9 OBESITY (BMI 30-39.9): ICD-10-CM

## 2022-12-01 DIAGNOSIS — L82.1 SEBORRHEIC KERATOSIS: ICD-10-CM

## 2022-12-01 DIAGNOSIS — M25.561 CHRONIC PAIN OF BOTH KNEES: ICD-10-CM

## 2022-12-01 DIAGNOSIS — I10 ESSENTIAL HYPERTENSION: ICD-10-CM

## 2022-12-01 DIAGNOSIS — I25.708 CORONARY ARTERY DISEASE OF BYPASS GRAFT OF NATIVE HEART WITH STABLE ANGINA PECTORIS (HCC): ICD-10-CM

## 2022-12-01 DIAGNOSIS — Z00.00 MEDICARE ANNUAL WELLNESS VISIT, SUBSEQUENT: ICD-10-CM

## 2022-12-01 DIAGNOSIS — Z13.31 SCREENING FOR DEPRESSION: ICD-10-CM

## 2022-12-01 DIAGNOSIS — E78.5 DYSLIPIDEMIA: ICD-10-CM

## 2022-12-01 DIAGNOSIS — E11.65 TYPE 2 DIABETES MELLITUS WITH HYPERGLYCEMIA, WITHOUT LONG-TERM CURRENT USE OF INSULIN (HCC): Primary | ICD-10-CM

## 2022-12-01 NOTE — PATIENT INSTRUCTIONS
Medicare Wellness Visit, Male    The best way to live healthy is to have a lifestyle where you eat a well-balanced diet, exercise regularly, limit alcohol use, and quit all forms of tobacco/nicotine, if applicable. Regular preventive services are another way to keep healthy. Preventive services (vaccines, screening tests, monitoring & exams) can help personalize your care plan, which helps you manage your own care. Screening tests can find health problems at the earliest stages, when they are easiest to treat. [facility] follows the current, evidence-based guidelines published by the [facility] Con [facility] ([facility]) when recommending preventive services for our patients. Because we follow these guidelines, sometimes recommendations change over time as research supports it. (For example, a prostate screening blood test is no longer routinely recommended for men with no symptoms). Of course, you and your doctor may decide to screen more often for some diseases, based on your risk and co-morbidities (chronic disease you are already diagnosed with). Preventive services for you include:  - Medicare offers their members a free annual wellness visit, which is time for you and your primary care provider to discuss and plan for your preventive service needs. Take advantage of this benefit every year!  -All adults over age 72 should receive the recommended pneumonia vaccines. Current USPSTF guidelines recommend a series of two vaccines for the best pneumonia protection.   -All adults should have a flu vaccine yearly and tetanus vaccine every 10 years.  -All adults age 48 and older should receive the shingles vaccines (series of two vaccines).        -All adults age 38-68 who are overweight should have a diabetes screening test once every three years.   -Other screening tests & preventive services for persons with diabetes include: an eye exam to screen for diabetic retinopathy, a kidney function test, a foot exam, and stricter control over your cholesterol.   -Cardiovascular screening for adults with routine risk involves an electrocardiogram (ECG) at intervals determined by the provider.   -Colorectal cancer screening should be done for adults age 54-65 with no increased risk factors for colorectal cancer. There are a number of acceptable methods of screening for this type of cancer. Each test has its own benefits and drawbacks. Discuss with your provider what is most appropriate for you during your annual wellness visit. The different tests include: colonoscopy (considered the best screening method), a fecal occult blood test, a fecal DNA test, and sigmoidoscopy.  -All adults born between [facility] should be screened once for Hepatitis C.  -An Abdominal Aortic Aneurysm (AAA) Screening is recommended for men age 73-68 who has ever smoked in their lifetime. Here is a list of your current Health Maintenance items (your personalized list of preventive services) with a due date:  Health Maintenance Due   Topic Date Due    Eye Exam  Never done    COVID-19 Vaccine (4 - Booster for [employer] series) 01/13/2022       Medicare Wellness Visit, Male    The best way to live healthy is to have a lifestyle where you eat a well-balanced diet, exercise regularly, limit alcohol use, and quit all forms of tobacco/nicotine, if applicable. Regular preventive services are another way to keep healthy. Preventive services (vaccines, screening tests, monitoring & exams) can help personalize your care plan, which helps you manage your own care. Screening tests can find health problems at the earliest stages, when they are easiest to treat. [facility] follows the current, evidence-based guidelines published by the [facility] [place] (USPSTF) when recommending preventive services for our patients.  Because we follow these guidelines, sometimes recommendations change over time as research supports it. (For example, a prostate screening blood test is no longer routinely recommended for men with no symptoms). Of course, you and your doctor may decide to screen more often for some diseases, based on your risk and co-morbidities (chronic disease you are already diagnosed with). Preventive services for you include:  - Medicare offers their members a free annual wellness visit, which is time for you and your primary care provider to discuss and plan for your preventive service needs. Take advantage of this benefit every year!  -All adults over age 72 should receive the recommended pneumonia vaccines. Current USPSTF guidelines recommend a series of two vaccines for the best pneumonia protection.   -All adults should have a flu vaccine yearly and tetanus vaccine every 10 years.  -All adults age 48 and older should receive the shingles vaccines (series of two vaccines). -All adults age 38-68 who are overweight should have a diabetes screening test once every three years.   -Other screening tests & preventive services for persons with diabetes include: an eye exam to screen for diabetic retinopathy, a kidney function test, a foot exam, and stricter control over your cholesterol.   -Cardiovascular screening for adults with routine risk involves an electrocardiogram (ECG) at intervals determined by the provider.   -Colorectal cancer screening should be done for adults age 54-65 with no increased risk factors for colorectal cancer. There are a number of acceptable methods of screening for this type of cancer. Each test has its own benefits and drawbacks. Discuss with your provider what is most appropriate for you during your annual wellness visit.  The different tests include: colonoscopy (considered the best screening method), a fecal occult blood test, a fecal DNA test, and sigmoidoscopy.  -All adults born between [facility] should be screened once for Hepatitis C.  -An Abdominal Aortic Aneurysm (AAA) Screening is recommended for men age 73-68 who has ever smoked in their lifetime.      Here is a list of your current Health Maintenance items (your personalized list of preventive services) with a due date:  Health Maintenance Due   Topic Date Due    Eye Exam  Never done    COVID-19 Vaccine (4 - Booster for [employer] series) 01/13/2022

## 2022-12-01 NOTE — PROGRESS NOTES
1. \"Have you been to the ER, urgent care clinic since your last visit? Hospitalized since your last visit? \" No    2. \"Have you seen or consulted any other health care providers outside of the [address] [place] [address] since your last visit? \" No     3. For patients aged 39-70: Has the patient had a colonoscopy / FIT/ Cologuard? Yes - no Care Gap present      If the patient is female:    4. For patients aged 41-77: Has the patient had a mammogram within the past 2 years? NA - based on age or sex      11. For patients aged 21-65: Has the patient had a pap smear? NA - based on age or sex  This is the Subsequent Medicare Annual Wellness Exam, performed 12 months or more after the Initial AWV or the last Subsequent AWV    I have reviewed the patient's medical history in detail and updated the computerized patient record. Assessment/Plan   Education and counseling provided:  Are appropriate based on today's review and evaluation    1. Medicare annual wellness visit, subsequent    2.  Screening for depression  - DEPRESSION SCREEN ANNUAL       Depression Risk Factor Screening     3 most recent PHQ Screens 12/1/2022   PHQ Not Done -   Little interest or pleasure in doing things Not at all   Feeling down, depressed, irritable, or hopeless Not at all   Total Score PHQ 2 0   Trouble falling or staying asleep, or sleeping too much Not at all   Feeling tired or having little energy Not at all   Poor appetite, weight loss, or overeating Not at all   Feeling bad about yourself - or that you are a failure or have let yourself or your family down Not at all   Trouble concentrating on things such as school, work, reading, or watching TV Not at all   Moving or speaking so slowly that other people could have noticed; or the opposite being so fidgety that others notice Not at all   Thoughts of being better off dead, or hurting yourself in some way Not at all   PHQ 9 Score 0   How difficult have these problems made it for you to do Physical Therapy  Visit Type: treatment  Precautions:  Medical precautions: ; contact precautions and airborne precautions. COVID +  Lines:     Basic: O2, capped IV and telemetry      Lines in chart and on patient reviewed, cautions maintained throughout session.    SUBJECTIVE  Patient agreed to participate in therapy this date.  Patient / Family Goal: maximize function and return home    Pain     At onset of session (out of 10): 0  RN informed on pain level     OBJECTIVE     Oriented to person, place, time and situation     Arousal alertness: appropriate responses to stimuli  Patient activity tolerance: 2 to 1 activity to rest  Range of Motion (measured in degrees unless otherwise noted, active unless indicated)  WNL: LLE, RLE  Strength (out of 5 unless otherwise indicated)   WFL: LLE, RLE  Balance    Sitting: Static: independent, Dynamic: modified independent double upper extrmity support    Standing - Firm Surface - Eyes Open: Static: supervision double upper extremity support  Dynamic: supervision double upper extremity support    Bed Mobility:      Training completed:      Deferred secondary to pt sitting in chair upon arrival and wishing to remain sitting at session conclusion  Transfers:    Assistive devices: 2-wheeled walker and gait belt    Sit to stand: supervision    Stand to sit: supervision  Training completed:      Education details: body mechanics, patient safety and patient requires additional training  Gait/Ambulation:     Assistance: supervision   Assistive device: 2-wheeled walker and gait belt    Distance (ft): 150    Type: decreased damian and step through  Training Completed:      Education details: body mechanics, patient safety and patient requires additional training    Pt ambulating distances in room; tolerated well      Interventions     Additional exercise details: PT encouraged pt to increase time sitting upright out of bed, encouraged to sit up for all meals.  Pt recommended increased  ambulation activity with staff assist and coordinated with nursing to complete.  Training provided: activity tolerance, bed mobility training, body mechanics, compensatory techniques, functional ambulation, gait training, HEP training, positioning, safety training, transfer training and use of adaptive equipment    Skilled input: Verbal instruction/cues and tactile instruction/cues  Verbal Consent: Writer verbally educated and received verbal consent for hand placement, positioning of patient, and techniques to be performed today from patient for clothing adjustments for techniques, hand placement and palpation for techniques and therapist position for techniques as described above and how they are pertinent to the patient's plan of care.       ASSESSMENT    Impairments: range of motion, balance deficits and activity tolerance  Functional Limitations: all functional mobility  Pt presents to PT close to her functional baseline.  She tolerated household mobility well without the need for physical assistance.  She does report feeling weaker than baseline and was limited prior to arrival.  Pt will benefit from continued PT to continue to progress independence and prevent deconditioning while in hospital.       Discharge Recommendations  Recommendation for Discharge: PT WI: Home                    Skilled therapy is required to address these limitations in attempt to maximize the patient's independence.    Pain at end of session: RN informed on pain level 0/10  Predicted patient presentation: Low (stable) - Patient comorbidities and complexities, as defined above, will have little effect on progress for prescribed plan of care.    End of Session:   Location: in chair  Safety measures: call light within reach and lines intact  Handoff to: nurse    PLAN    Suggestions for next session as indicated: Bed mobility, transfers, progress ambulation, trial 4WW, LE therex    PT Frequency: 5 days/week  Frequency Comments: 1   your work, take care of your home and get along with others Not difficult at all   8 minutes taken on depression screening. Alcohol & Drug Abuse Risk Screen    Do you average more than 1 drink per night or more than 7 drinks a week: No    In the past three months have you have had more than 4 drinks containing alcohol on one occasion: No          Functional Ability and Level of Safety    Hearing: Hearing is good. Activities of Daily Living: The home contains: no safety equipment. Patient does total self care      Ambulation: with no difficulty     Fall Risk:  Fall Risk Assessment, last 12 mths 8/1/2022   Able to walk? Yes   Fall in past 12 months? 0   Do you feel unsteady?  0   Are you worried about falling 0      Abuse Screen:  Patient is not abused       Cognitive Screening    Has your family/caregiver stated any concerns about your memory: no     Cognitive Screening: Normal - Verbal Fluency Test    Health Maintenance Due     Health Maintenance Due   Topic Date Due    Eye Exam Retinal or Dilated  Never done    COVID-19 Vaccine (4 - Booster for Pfizer series) 01/13/2022    Flu Vaccine (1) 08/01/2022    Medicare Yearly Exam  12/22/2022       Patient Care Team   Patient Care Team:  Tom Noguera MD as PCP - General (Family Medicine)  Tom Noguera MD as PCP [facility]  Ferd Duane, DO (Cardiovascular Disease Physician)  Daya Barreto MD (Ophthalmology)  Candie Royal MD as Surgeon (Colon and Rectal Surgery)  Dontae Latif MD (Pulmonary Disease)    History   Chance Baca comes in for Medicare wellness exam.    Patient Active Problem List   Diagnosis Code    Gastroesophageal reflux disease K21.9    Coronary artery disease with stable angina pectoris [facility]) I25.118    Atherosclerosis of coronary artery I25.10    Atypical chest pain R07.89    HLD (hyperlipidemia) E78.5    Paroxysmal atrial fibrillation (HCC) I48.0    S/P CABG x 4 Z95.1    Type 2 diabetes mellitus E11.9   Interventions: balance, bed mobility, endurance training, equipment eval/education, functional transfer training, gait training, HEP train/position, patient/family training, safety education, stairs retraining, strengthening and ROM  Agreement to plan and goals: patient agrees with goals and treatment plan        GOALS  Review Date: 9/1/2021  Long Term Goals: (to be met by time of discharge from hospital)  Sit to supine: Patient will complete sit to supine modified independent.  Status: progressing/ongoing  Supine to sit: Patient will complete supine to sit modified independent.  Status: progressing/ongoing  Sit to stand: Patient will complete sit to stand transfer with 4-wheeled walker, modified independent.   Status: progressing/ongoing  Stand to sit: Patient will complete stand to sit transfer with 4-wheeled walker, modified independent.   Status: progressing/ongoing  Ambulation (even): Patient will ambulate on even surface for 150 feet with 4-wheeled walker, modified independent.   Status: progressing/ongoing  Home program: patient independent with home program as instructed.   Status: progressing/ongoing    Documented in the chart in the following areas: Prior Level of Function. Assessment.      Therapy procedure time and total treatment time can be found documented on the Time Entry flowsheet   Past Medical History:   Diagnosis Date    Bypass graft mechanical complication (HCC)     quadropple    Diabetes ([address]     Glaucoma     History of staph infection     knee    HTN (hypertension)     Hyperlipemia     SOB (shortness of breath)       Past Surgical History:   Procedure Laterality Date    COLONOSCOPY N/A 03/26/2021    COLONOSCOPY performed by Emmanuel Smith MD at [facility]    HX CATARACT REMOVAL      HX COLONOSCOPY  2010    normal    HX CORONARY ARTERY BYPASS GRAFT  2019    HX CYST REMOVAL      from back    HX HERNIA REPAIR Bilateral     HX KNEE REPLACEMENT  2017 and 2018    rigjt and left replacement      Current Outpatient Medications   Medication Sig Dispense Refill    colesevelam (WELCHOL) 625 mg tablet TAKE 2 TABLETS BY MOUTH EVERY  Tablet 1    metoprolol succinate (TOPROL-XL) 100 mg tablet Take 1 Tablet by mouth nightly. 90 Tablet 1    rosuvastatin (CRESTOR) 5 mg tablet TAKE 1 TABLET BY MOUTH AT  NIGHT 90 Tablet 3    metFORMIN ER (GLUCOPHAGE XR) 500 mg tablet TAKE 1 TABLET BY MOUTH  TWICE DAILY WITH MEALS 180 Tablet 3    isosorbide mononitrate ER (IMDUR) 30 mg tablet TAKE 1 TABLET BY MOUTH  DAILY 90 Tablet 3    lisinopriL (PRINIVIL, ZESTRIL) 5 mg tablet Take 5 mg by mouth daily. latanoprost (XALATAN) 0.005 % ophthalmic solution Administer 1 Drop to both eyes nightly. multivitamin, tx-iron-ca-min (THERA-M w/ IRON) 9 mg iron-400 mcg tab tablet Take 1 Tab by mouth daily. acetaminophen (TYLENOL) 500 mg tablet Take 500 mg by mouth every six (6) hours as needed for Pain.      lancets misc Check blood glucose  daily. Patient would like One Touch Delica Plus Lancets 523 Each 3    glucose blood VI test strips (blood glucose test) strip Check blood glucose  daily. Patient would like One Touch Yashira test strips 100 Strip 3    aspirin delayed-release 81 mg tablet Take 81 mg by mouth daily. co-enzyme Q-10 (CO Q-10) 100 mg capsule Take 100 mg by mouth daily.        Allergies Allergen Reactions    Atorvastatin Other (comments)     Muscle weakness    Heparin Other (comments)     thrombocytopenia    Heparin Analogues Other (comments)     thrombocytopenia    Medrol [Methylprednisolone] Other (comments)     Muscle weakness    Statins-Hmg-Coa Reductase Inhibitors Other (comments)    Zetia [Ezetimibe] Other (comments)     Muscle joint pain    Zocor [Simvastatin] Other (comments)     Muscle joint pain       Family History   Problem Relation Age of Onset    Heart Attack Father     Heart Disease Sister     Heart Attack Brother     Leukemia Brother     Anemia Brother      Social History     Tobacco Use    Smoking status: Former     Packs/day: 0.50     Years: 2.00     Pack years: 1.00     Types: Cigarettes     Quit date:      Years since quittin.9    Smokeless tobacco: Never   Substance Use Topics    Alcohol use: Yes     Alcohol/week: 1.0 standard drink     Types: 1 Shots of liquor per week     Comment: social     I have discussed the diagnosis with the patient and the intended plan of care as seen in the above orders. The patient has received an after-visit summary and questions were answered concerning future plans. I have discussed medication, side effects, and warnings with the patient in detail. The patient verbalized understanding and is in agreement with the plan of care. The patient will contact the office with any additional concerns. Personalized preventative plan of care was discussed, printed and given to patient.     Wu Godfrey MD

## 2022-12-01 NOTE — PROGRESS NOTES
MORIAH Britt comes in for follow-up care. Hypertension: Patient has hypertension. Blood pressure is stable. Patient is on Toprol-XL and lisinopril. We will continue with the current treatment plan. He denies headache, changes in vision or focal weakness. Diabetes mellitus type 2: Patient has type 2 diabetes mellitus. Last HbA1c was 5.9. Patient will intensify lifestyle and dietary modification. He is on metformin. Continue current treatment plan. CAD: Patient has a history of CAD and has had coronary artery bypass graft done. Denies chest pain, shortness of breath or diaphoresis. He has recently been seen by the cardiologist.  He is on metoprolol, Crestor, Imdur, lisinopril and aspirin. He will continue current treatment plan. Cardiac dysrhythmia: Patient has a history of cardiac dysrhythmia. This happened once when he had an episode of syncope. Has been stable since. He will continue to follow-up with a cardiologist.  Denies palpitations or syncope. Dyslipidemia: Patient has dyslipidemia. He is on Crestor and WelChol. Continue current treatment plan. We will recheck lipid panel at next visit. Knee pain: Patient has bilateral knee pain. He is being seen by the orthopedist.  He is undergoing physical therapy. He has had bilateral total knee replacements in the past.  We will have him follow-up with a specialist.  Skin lesion: Patient has several keratosis lesion on the neck right aspect. Has multiple seborrheic keratosis lesions. There is one that is dark and. Discussed referral to see dermatology to get this frozen off. He would prefer to hold off for now. Overweight: Patient has a BMI of 34.56. He will intensify lifestyle and dietary modification.       Past Medical History  Past Medical History:   Diagnosis Date    Bypass graft mechanical complication (HCC)     quadropple    Diabetes ([address]     Glaucoma     History of staph infection     knee    HTN (hypertension)     Hyperlipemia SOB (shortness of breath)        Surgical History  Past Surgical History:   Procedure Laterality Date    COLONOSCOPY N/A 03/26/2021    COLONOSCOPY performed by French Phillip MD at [facility]      HX COLONOSCOPY  2010    normal    HX CORONARY ARTERY BYPASS GRAFT  2019    HX CYST REMOVAL      from back    HX HERNIA REPAIR Bilateral     HX KNEE REPLACEMENT  2017 and 2018    rigjt and left replacement         Medications  Current Outpatient Medications   Medication Sig Dispense Refill    colesevelam (WELCHOL) 625 mg tablet TAKE 2 TABLETS BY MOUTH EVERY  Tablet 1    metoprolol succinate (TOPROL-XL) 100 mg tablet Take 1 Tablet by mouth nightly. 90 Tablet 1    rosuvastatin (CRESTOR) 5 mg tablet TAKE 1 TABLET BY MOUTH AT  NIGHT 90 Tablet 3    metFORMIN ER (GLUCOPHAGE XR) 500 mg tablet TAKE 1 TABLET BY MOUTH  TWICE DAILY WITH MEALS 180 Tablet 3    isosorbide mononitrate ER (IMDUR) 30 mg tablet TAKE 1 TABLET BY MOUTH  DAILY 90 Tablet 3    lisinopriL (PRINIVIL, ZESTRIL) 5 mg tablet Take 5 mg by mouth daily. latanoprost (XALATAN) 0.005 % ophthalmic solution Administer 1 Drop to both eyes nightly. multivitamin, tx-iron-ca-min (THERA-M w/ IRON) 9 mg iron-400 mcg tab tablet Take 1 Tab by mouth daily. acetaminophen (TYLENOL) 500 mg tablet Take 500 mg by mouth every six (6) hours as needed for Pain.      lancets misc Check blood glucose  daily. Patient would like One Touch Delica Plus Lancets 413 Each 3    glucose blood VI test strips (blood glucose test) strip Check blood glucose  daily. Patient would like One Touch Yashira test strips 100 Strip 3    aspirin delayed-release 81 mg tablet Take 81 mg by mouth daily. co-enzyme Q-10 (CO Q-10) 100 mg capsule Take 100 mg by mouth daily.          Allergies  Allergies   Allergen Reactions    Atorvastatin Other (comments)     Muscle weakness    Heparin Other (comments)     thrombocytopenia    Heparin Analogues Other (comments) thrombocytopenia    Medrol [Methylprednisolone] Other (comments)     Muscle weakness    Statins-Hmg-Coa Reductase Inhibitors Other (comments)    Zetia [Ezetimibe] Other (comments)     Muscle joint pain    Zocor [Simvastatin] Other (comments)     Muscle joint pain       Family History  Family History   Problem Relation Age of Onset    Heart Attack Father     Heart Disease Sister     Heart Attack Brother     Leukemia Brother     Anemia Brother        Social History  Social History     Socioeconomic History    Marital status:      Spouse name: Not on file    Number of children: Not on file    Years of education: Not on file    Highest education level: Not on file   Occupational History    Not on file   Tobacco Use    Smoking status: Former     Packs/day: 0.50     Years: 2.00     Pack years: 1.00     Types: Cigarettes     Quit date: 1970     Years since quittin.9    Smokeless tobacco: Never   Vaping Use    Vaping Use: Never used   Substance and Sexual Activity    Alcohol use: Yes     Alcohol/week: 1.0 standard drink     Types: 1 Shots of liquor per week     Comment: social    Drug use: Never    Sexual activity: Yes     Partners: Female   Other Topics Concern    Not on file   Social History Narrative    Not on file     Social Determinants of Health     Financial Resource Strain: Not on file   Food Insecurity: Not on file   Transportation Needs: Not on file   Physical Activity: Unknown    Days of Exercise per Week: 0 days    Minutes of Exercise per Session: Not on file   Stress: Not on file   Social Connections: Not on file   Intimate Partner Violence: Not At Risk    Fear of Current or Ex-Partner: No    Emotionally Abused: No    Physically Abused: No    Sexually Abused: No   Housing Stability: Not on file       Review of Systems  Review of Systems - Review of all systems is negative except as noted above in the HPI.     Vital Signs  Visit Vitals  /73 (BP 1 Location: Left upper arm, BP Patient Position: Sitting, BP Cuff Size: Adult long)   Pulse 71   Temp 97.8 °F (36.6 °C)   Resp 20   Ht 5' 9\" (1.753 m)   Wt 234 lb (106.1 kg)   SpO2 93%   BMI 34.56 kg/m²         Physical Exam  Physical Examination: General appearance - alert, well appearing, and in no distress, oriented to person, place, and time, overweight, and acyanotic, in no respiratory distress  Mental status - affect appropriate to mood  Neck - supple, no significant adenopathy  Lymphatics - no palpable lymphadenopathy  Chest - no tachypnea, retractions or cyanosis  Heart - S1 and S2 normal  Abdomen - no rebound tenderness noted  Back exam - limited range of motion  Neurological - normal muscle tone, no tremors, strength 5/5  Musculoskeletal - osteoarthritic changes noted in both hands, bilateral knee discomfort to palpation  Extremities - no pedal edema noted, intact peripheral pulses  Skin -seborrheic keratosis lesions posterior aspect of the neck right side      Results  Results for orders placed or performed in visit on 08/01/22   HEMOGLOBIN A1C W/O EAG   Result Value Ref Range    Hemoglobin A1c 7.6 (H) 4.8 - 5.6 %    AVG  (H) 91 - 123 mg/dL   LIPID PANEL   Result Value Ref Range    Triglyceride 242 (H) 40 - 149 mg/dL    HDL Cholesterol 39 (L) >=40 mg/dL    Cholesterol, total 183 110 - 200 mg/dL    CHOLESTEROL/HDL 4.7 0.0 - 5.0    Non-HDL Cholesterol 144 (H) <130 mg/dL    LDL, calculated 96 50 - 99 mg/dL    VLDL, calculated 48 (H) 8 - 30 mg/dL    LDL/HDL Ratio 2.5    PSA SCREENING (SCREENING)   Result Value Ref Range    Prostate Specific Ag 2.320 <=5.641 ng/mL   METABOLIC PANEL, COMPREHENSIVE   Result Value Ref Range    Glucose 113 (H) 70 - 99 mg/dL    BUN 18 6 - 22 mg/dL    Creatinine 0.9 0.8 - 1.6 mg/dL    Sodium 137 133 - 145 mmol/L    Potassium 4.2 3.5 - 5.5 mmol/L    Chloride 100 98 - 110 mmol/L    CO2 27 20 - 32 mmol/L    AST (SGOT) 29 10 - 37 U/L    ALT (SGPT) 38 5 - 40 U/L    Alk.  phosphatase 73 40 - 125 U/L    Bilirubin, total 0.6 0.2 - 1.2 mg/dL    Calcium 9.7 8.4 - 10.5 mg/dL    Protein, total 7.0 6.2 - 8.1 g/dL    Albumin 4.5 3.5 - 5.0 g/dL    A-G Ratio 1.8 1.1 - 2.6 ratio    Globulin 2.5 2.0 - 4.0 g/dL    GLOMERULAR FILTRATION RATE >60.0 >60.0 mL/min/1.73 sq.m. Anion gap 10.0 3.0 - 15.0 mmol/L   CBC WITH AUTOMATED DIFF   Result Value Ref Range    WBC 6.7 4.0 - 11.0 K/uL    RBC 4.26 3.80 - 5.80 M/uL    HGB 14.0 12.6 - 17.1 g/dL    HCT 42.0 37.8 - 52.2 %    MCV 99 (H) 80 - 95 fL    MCH 33 26 - 34 pg    MCHC 33 31 - 36 g/dL    RDW 12.5 10.0 - 15.5 %    PLATELET 693 892 - 059 K/uL    MPV 9.3 9.0 - 13.0 fL    NEUTROPHILS 61 40 - 75 %    Lymphocytes 25 20 - 45 %    MONOCYTES 12 3 - 12 %    EOSINOPHILS 2 0 - 6 %    BASOPHILS 0 0 - 2 %    ABS. NEUTROPHILS 4.1 1.8 - 7.7 K/uL    ABSOLUTE LYMPHOCYTE COUNT 1.7 1.0 - 4.8 K/uL    ABS. MONOCYTES 0.8 0.1 - 1.0 K/uL    ABS. EOSINOPHILS 0.1 0.0 - 0.5 K/uL    ABS. BASOPHILS 0.0 0.0 - 0.2 K/uL   AMB POC URINE, MICROALBUMIN, SEMIQUANTITATIVE   Result Value Ref Range    Microalbumin urine (POC) 10 MG/L    Microalbumin/creat ratio (POC) <30 <30 MG/G       ASSESSMENT and PLAN    ICD-10-CM ICD-9-CM    1. Type 2 diabetes mellitus with hyperglycemia, without long-term current use of insulin ([facility])  E11.65 250.00      790.29       2. Coronary artery disease of bypass graft of native heart with stable angina pectoris ([facility])  I25.708 414.05      413.9       3. Essential hypertension  I10 401.9       4. Seborrheic keratosis  L82.1 702.19       5. Dyslipidemia  E78.5 272.4       6. Cardiac arrhythmia, unspecified cardiac arrhythmia type  I49.9 427.9       7. Obesity (BMI 30-39. 9)  E66.9 278.00       8. Chronic pain of both knees  M25.561 719.46     M25.562 338.29     G89.29        9.  Medicare annual wellness visit, subsequent  Z00.00 V70.0       10. Screening for depression  Z13.31 V79.0 Anca Strickland        lab results and schedule of future lab studies reviewed with patient  reviewed diet, exercise and weight control  cardiovascular risk and specific lipid/LDL goals reviewed  reviewed medications and side effects in detail  specific diabetic recommendations: low cholesterol diet, weight control and daily exercise discussed, home glucose monitoring emphasized, all medications, side effects and compliance discussed carefully, annual eye examinations at Ophthalmology discussed, glycohemoglobin and other lab monitoring discussed, and long term diabetic complications discussed  use of aspirin to prevent MI and TIA's discussed      I have discussed the diagnosis with the patient and the intended plan of care as seen in the above orders. The patient has received an after-visit summary and questions were answered concerning future plans. I have discussed medication, side effects, and warnings with the patient in detail. The patient verbalized understanding and is in agreement with the plan of care. The patient will contact the office with any additional concerns. Silke Patrick MD    PLEASE NOTE:   This document has been produced using voice recognition software.  Unrecognized errors in transcription may be present

## 2022-12-02 ENCOUNTER — HOSPITAL ENCOUNTER (OUTPATIENT)
Dept: PHYSICAL THERAPY | Age: 67
Discharge: HOME | End: 2022-12-02
Attending: ORTHOPAEDIC SURGERY
Payer: MEDICARE

## 2022-12-02 PROCEDURE — 97140 MANUAL THERAPY 1/> REGIONS: CPT | Performed by: PHYSICAL THERAPIST

## 2022-12-02 PROCEDURE — 97110 THERAPEUTIC EXERCISES: CPT | Performed by: PHYSICAL THERAPIST

## 2022-12-02 PROCEDURE — 97112 NEUROMUSCULAR REEDUCATION: CPT | Performed by: PHYSICAL THERAPIST

## 2022-12-02 NOTE — PROGRESS NOTES
PT DAILY TREATMENT NOTE     Patient Name: Yumiko Cortez  Date:2022  : 1955  [x]  Patient  Verified  Payor: Annetta Silva / Plan: [facility] MEDICARE COMPLETE / Product Type: Managed Care Medicare /    In time:8:44  Out time:9:48  Total Treatment Time (min): 64  Visit #: 2 of 12    Medicare/BCBS Only   Total Timed Codes (min):  55 1:1 Treatment Time:  55   Nine minutes unrelated conversation. Treatment Area: Left knee pain [M25.562]  Right knee pain [M25.561]    SUBJECTIVE  Pain Level (0-10 scale): 0 (stiffness \"I don't call it pain)  Any medication changes, allergies to medications, adverse drug reactions, diagnosis change, or new procedure performed?: [x] No    [] Yes (see summary sheet for update)  Subjective functional status/changes:   [] No changes reported  Patient continues to note a constant tightness and discomfort in his distal quads and knees. He notes compliance with his HEP. OBJECTIVE    30 min Therapeutic Exercise:  [] See flow sheet :   Rationale: increase ROM and increase strength to improve the patients ability to increase patients ADLs. 17 min Neuromuscular Re-education:  []  See flow sheet :   Rationale: increase strength, improve coordination, improve balance, and increase proprioception  to improve the patients ability to ambulate on all surfaces. 8 min Manual Therapy:  Tibia-femoral joint mobs in hook lying, grade III PA and PA mobs, manual stretching to increase flexion. The manual therapy interventions were performed at a separate and distinct time from the therapeutic activities interventions. Rationale: increase ROM and increase tissue extensibility to increase ease of motion to improve function.     With   [] TE   [] TA   [] neuro   [] other: Patient Education: [x] Review HEP    [] Progressed/Changed HEP based on:   [] positioning   [] body mechanics   [] transfers   [] heat/ice application    [] other:      Other Objective/Functional Measures: PROM 0-107 degrees. Pain Level (0-10 scale) post treatment: 0 (just some stiffness)    ASSESSMENT/Changes in Function: Patient with increased PROM, decreased pain. Patient will continue to benefit from skilled PT services to modify and progress therapeutic interventions, address functional mobility deficits, address ROM deficits, analyze and address soft tissue restrictions, analyze and cue movement patterns, and analyze and modify body mechanics/ergonomics to attain remaining goals. [x]  See Plan of Care  []  See progress note/recertification  []  See Discharge Summary         Progress towards goals / Updated goals:  Short Term Goals: To be accomplished in 1-2 treatments:  1. Patient will become proficient in their HEP and will be compliant in performing that program.  Evaluation:   Patient given a written/illustrated HEP. Current:  Patient reports compliance with his HEP.  12/2/2022. Goal Met. Long Term Goals: To be accomplished in 12 treatments:  1. Patient's pain level will be 0/10-3/10 with activity in order to improve patient's ability to perform normal ADLs. Evaluation:  0/10-8/10  2. Patient will demonstrate 0-110 degrees AROM left knee to increase ease of ADLs. Evaluation:  AROM left knee 0-102 degrees. Current:  PROM left knee 0-107 degrees. 12/2/2022. Progressing. 3. Patient will increase FOTO score to  65 to indicate increased functional mobility. Evaluation:  61  4. Patient will report little to no difficulty performing heavy activities around his home in order to increased his normal ADLs. .  Evaluation:  Notes moderate difficulty.     PLAN  [x]  Upgrade activities as tolerated     [x]  Continue plan of care  []  Update interventions per flow sheet       []  Discharge due to:_  []  Other:_      Conner Khan, PT 12/2/2022  8:48 AM    Future Appointments   Date Time Provider Archie Richardson   12/5/2022  5:45 PM Leodan Delgadillo PTA [facility]   12/8/2022  8:45 AM Baca Hoang, PTA [facility]   12/12/2022  2:30 PM Arias Hipps, PT [facility]   12/15/2022  1:00 PM Arias Hipps, PT [facility]   12/19/2022 10:00 AM Arias Hipps, PT [facility]   12/22/2022 10:00 AM Ananth Jiménez, PTA [facility]   1/9/2023  8:30 AM Rhys Casper MD [place] BS AMB   4/6/2023 12:15 PM Juan Grider MD [facility] BS AMB   5/2/2023  9:30 AM Frank Downey MD [facility] BS AMB   12/4/2023 11:15 AM Juan Grider MD [facility] BS AMB

## 2022-12-05 ENCOUNTER — HOSPITAL ENCOUNTER (OUTPATIENT)
Dept: PHYSICAL THERAPY | Age: 67
Discharge: HOME | End: 2022-12-05
Attending: ORTHOPAEDIC SURGERY
Payer: MEDICARE

## 2022-12-05 PROCEDURE — 97110 THERAPEUTIC EXERCISES: CPT | Performed by: PHYSICAL THERAPIST

## 2022-12-05 PROCEDURE — 97140 MANUAL THERAPY 1/> REGIONS: CPT | Performed by: PHYSICAL THERAPIST

## 2022-12-05 PROCEDURE — 97112 NEUROMUSCULAR REEDUCATION: CPT | Performed by: PHYSICAL THERAPIST

## 2022-12-05 RX ORDER — ISOSORBIDE MONONITRATE 30 MG/1
TABLET, EXTENDED RELEASE ORAL
Qty: 90 TABLET | Refills: 3 | Status: SHIPPED | OUTPATIENT
Start: 2022-12-05

## 2022-12-05 NOTE — PROGRESS NOTES
PT DAILY TREATMENT NOTE     Patient Name: Lita Little  Date:2022  : 1955  [x]  Patient  Verified  Payor: Sachin Massey / Plan: [facility] MEDICARE COMPLETE / Product Type: Managed Care Medicare /    In time:7:55  Out time:8:48  Total Treatment Time (min): 53  Visit #: 3 of 12    Medicare/BCBS Only   Total Timed Codes (min):  53 1:1 Treatment Time:  53       Treatment Area: Left knee pain [M25.562]  Right knee pain [M25.561]    SUBJECTIVE  Pain Level (0-10 scale): 2  Any medication changes, allergies to medications, adverse drug reactions, diagnosis change, or new procedure performed?: [x] No    [] Yes (see summary sheet for update)  Subjective functional status/changes:   [] No changes reported  Patient reports stiffness but seem to be loosening up and \"getting better\". He was \"running up and down stairs\" this past weekend that caused some soreness. OBJECTIVE      28 min Therapeutic Exercise:  [] See flow sheet :   Rationale: increase ROM and increase strength to improve the patients ability to increase patients ADLs. 25 min Neuromuscular Re-education:  []  See flow sheet :   Rationale: increase strength, improve coordination, improve balance, and increase proprioception  to improve the patients ability to ambulate on all surfaces. With   [] TE   [] TA   [] neuro   [] other: Patient Education: [x] Review HEP    [] Progressed/Changed HEP based on:   [] positioning   [] body mechanics   [] transfers   [] heat/ice application    [] other:      Other Objective/Functional Measures: Gait is without significant deviation. Pain Level (0-10 scale) post treatment: 0    ASSESSMENT/Changes in Function: Patient with decreased pain and improving functional activity level noted over the weekend.     Patient will continue to benefit from skilled PT services to modify and progress therapeutic interventions, address functional mobility deficits, address ROM deficits, analyze and address soft tissue restrictions, analyze and cue movement patterns, and analyze and modify body mechanics/ergonomics to attain remaining goals. [x]  See Plan of Care  []  See progress note/recertification  []  See Discharge Summary         Progress towards goals / Updated goals:  Short Term Goals: To be accomplished in 1-2 treatments:  1. Patient will become proficient in their HEP and will be compliant in performing that program.  Evaluation:   Patient given a written/illustrated HEP. Current:  Patient reports compliance with his HEP.  12/2/2022. Goal Met. Long Term Goals: To be accomplished in 12 treatments:  1. Patient's pain level will be 0/10-3/10 with activity in order to improve patient's ability to perform normal ADLs. Evaluation:  0/10-8/10  Current:  0/10-7/10.  12/5/2022. Progressing. 2. Patient will demonstrate 0-110 degrees AROM left knee to increase ease of ADLs. Evaluation:  AROM left knee 0-102 degrees. Current:  PROM left knee 0-107 degrees. 12/2/2022. Progressing. 3. Patient will increase FOTO score to  65 to indicate increased functional mobility. Evaluation:  61  4. Patient will report little to no difficulty performing heavy activities around his home in order to increased his normal ADLs. .  Evaluation:  Notes moderate difficulty.     PLAN  [x]  Upgrade activities as tolerated     [x]  Continue plan of care  []  Update interventions per flow sheet       []  Discharge due to:_  []  Other:_      Ady Lockhart, PT 12/5/2022  8:01 AM    Future Appointments   Date Time Provider Archie Richardson   12/8/2022  7:15 AM Courtney Márquez PTA [facility]   12/12/2022  2:30 PM Tereasa Dach, PT [facility]   12/15/2022  1:00 PM Tereasa Dach, PT [facility]   12/19/2022 10:00 AM Tereasa Dach, PT [facility]   12/22/2022 10:00 AM Ambrocio Nevarez PTA [facility]   1/9/2023  8:30 AM Omer Lara MD [facility] AMB   4/6/2023 12:15 PM Malena Grider MD [facility] BS AMB 5/2/2023  9:30 AM Barbara Hercules MD [facility] BS AMB   12/4/2023 11:15 AM Justice Grider MD [facility] BS AMB

## 2022-12-08 ENCOUNTER — HOSPITAL ENCOUNTER (OUTPATIENT)
Dept: PHYSICAL THERAPY | Age: 67
Discharge: HOME | End: 2022-12-08
Attending: ORTHOPAEDIC SURGERY
Payer: MEDICARE

## 2022-12-08 PROCEDURE — 97110 THERAPEUTIC EXERCISES: CPT | Performed by: PHYSICAL THERAPIST

## 2022-12-08 PROCEDURE — 97112 NEUROMUSCULAR REEDUCATION: CPT | Performed by: PHYSICAL THERAPIST

## 2022-12-08 NOTE — PROGRESS NOTES
PT DAILY TREATMENT NOTE     Patient Name: Makayla Wylie  Date:2022  : 1955  [x]  Patient  Verified  Payor: Sybil Goodell / Plan: [facility] MEDICARE COMPLETE / Product Type: Managed Care Medicare /    In time:7:15  Out time:7:55  Total Treatment Time (min): 40  Visit #: 4 of 12    Medicare/BCBS Only   Total Timed Codes (min):  40 1:1 Treatment Time:  40       Treatment Area: Left knee pain [M25.562]  Right knee pain [M25.561]    SUBJECTIVE  Pain Level (0-10 scale): 2  Any medication changes, allergies to medications, adverse drug reactions, diagnosis change, or new procedure performed?: [x] No    [] Yes (see summary sheet for update)  Subjective functional status/changes:   [] No changes reported  Patient reports he has been helping with [employer]. Notes the stiffness he was having in both of his knees has improved some and he feels much looser. OBJECTIVE      17 min Therapeutic Exercise:  [] See flow sheet :   Rationale: increase ROM and increase strength to improve the patients ability to increase patients ADLs. 23 min Neuromuscular Re-education:  []  See flow sheet :   Rationale: increase strength, improve coordination, improve balance, and increase proprioception  to improve the patients ability to ambulate on all surfaces. With   [] TE   [] TA   [] neuro   [] other: Patient Education: [x] Review HEP    [] Progressed/Changed HEP based on:   [] positioning   [] body mechanics   [] transfers   [] heat/ice application    [] other:      Other Objective/Functional Measures: Gait is without deviation. Increased resistance on bike to Level 3 and increased band from red to green. Pain Level (0-10 scale) post treatment: 0    ASSESSMENT/Changes in Function: Patient with decreased symptoms in both knees, more functional activity tolerated.       Patient will continue to benefit from skilled PT services to modify and progress therapeutic interventions, address functional mobility deficits, address ROM deficits, analyze and address soft tissue restrictions, analyze and cue movement patterns, and analyze and modify body mechanics/ergonomics to attain remaining goals. [x]  See Plan of Care  []  See progress note/recertification  []  See Discharge Summary         Progress towards goals / Updated goals:  Short Term Goals: To be accomplished in 1-2 treatments:  1. Patient will become proficient in their HEP and will be compliant in performing that program.  Evaluation:   Patient given a written/illustrated HEP. Current:  Patient reports compliance with his HEP.  12/2/2022. Goal Met. Long Term Goals: To be accomplished in 12 treatments:  1. Patient's pain level will be 0/10-3/10 with activity in order to improve patient's ability to perform normal ADLs. Evaluation:  0/10-8/10  Current:  0/10-7/10.  12/5/2022. Progressing. 2. Patient will demonstrate 0-110 degrees AROM left knee to increase ease of ADLs. Evaluation:  AROM left knee 0-102 degrees. Current:  PROM left knee 0-107 degrees. 12/2/2022. Progressing. 3. Patient will increase FOTO score to  65 to indicate increased functional mobility. Evaluation:  61  4. Patient will report little to no difficulty performing heavy activities around his home in order to increased his normal ADLs. .  Evaluation:  Notes moderate difficulty. Current:  Working on a home for [employer] and did not have any difficulty with that yesterday. 12/8/2022. Goal Met.     PLAN  [x]  Upgrade activities as tolerated     [x]  Continue plan of care  []  Update interventions per flow sheet       []  Discharge due to:_  []  Other:_      Tommi Lanes, PT 12/8/2022  7:25 AM    Future Appointments   Date Time Provider Archie Richardson   12/12/2022  2:30 PM Montana Hernandez, PT [facility]   12/15/2022  1:00 PM Montana Hernandez PT [facility]   12/19/2022 10:00 AM Montana Hernandez PT [facility]   12/22/2022 10:00 AM Luiz Leslie PTA [facility]   1/9/2023  8:30 AM Karla Coughlin MD [facility] AMB   4/6/2023 12:15 PM Malena Grider MD [facility] BS AMB   5/2/2023  9:30 AM Bryanna Karimi MD [facility] BS AMB   12/4/2023 11:15 AM Erin Grider MD [facility] BS AMB

## 2022-12-12 ENCOUNTER — APPOINTMENT (OUTPATIENT)
Dept: PHYSICAL THERAPY | Age: 67
End: 2022-12-12
Attending: ORTHOPAEDIC SURGERY
Payer: MEDICARE

## 2022-12-15 ENCOUNTER — HOSPITAL ENCOUNTER (OUTPATIENT)
Dept: PHYSICAL THERAPY | Age: 67
Discharge: HOME | End: 2022-12-15
Attending: ORTHOPAEDIC SURGERY
Payer: MEDICARE

## 2022-12-15 PROCEDURE — 97112 NEUROMUSCULAR REEDUCATION: CPT | Performed by: PHYSICAL THERAPIST

## 2022-12-15 PROCEDURE — 97110 THERAPEUTIC EXERCISES: CPT | Performed by: PHYSICAL THERAPIST

## 2022-12-15 NOTE — PROGRESS NOTES
PT DAILY TREATMENT NOTE     Patient Name: Makayla Wylie  Date:12/15/2022  : 1955  [x]  Patient  Verified  Payor: [employer] MEDICARE COMPLETE / [facility] / Product Type: Managed Care Medicare /    In time:1:02  Out time:1:54  Total Treatment Time (min): 52  Visit #: 5 of 12    Medicare/BCBS Only   Total Timed Codes (min):  42 1:1 Treatment Time:  32       Treatment Area: Left knee pain [M25.562]  Right knee pain [M25.561]    SUBJECTIVE  Pain Level (0-10 scale): 0  Any medication changes, allergies to medications, adverse drug reactions, diagnosis change, or new procedure performed?: [x] No    [] Yes (see summary sheet for update)  Subjective functional status/changes:   [] No changes reported  Patient states he feels less tightness in the distal quads. Feels some discomfort in the \"knee cap\". He relates some back issues with prone quad stretching. OBJECTIVE    Modality rationale: decrease inflammation and decrease pain to improve the patients ability to increase tolerance to activity.    Min Type Additional Details    [] Estim:  []Unatt       []IFC  []Premod                        []Other:  []w/ice   []w/heat  Position:  Location:    [] Estim: []Att    []TENS instruct  []NMES                    []Other:  []w/US   []w/ice   []w/heat  Position:  Location:    []  Traction: [] Cervical       []Lumbar                       [] Prone          []Supine                       []Intermittent   []Continuous Lbs:  [] before manual  [] after manual    []  Ultrasound: []Continuous   [] Pulsed                           []1MHz   []3MHz W/cm2:  Location:    []  Iontophoresis with dexamethasone         Location: [] Take home patch   [] In clinic   10 [x]  Ice     []  heat  []  Ice massage  []  Laser   []  Anodyne Position:  supine  Location: knee    []  Laser with stim  []  Other:  Position:  Location:    []  Vasopneumatic Device    []  Right     []  Left  Pre-treatment girth:  Post-treatment girth: Measured at (location):  Pressure:       [] lo [] med [] hi   Temperature: [] lo [] med [] hi   [] Skin assessment post-treatment:  []intact []redness- no adverse reaction    []redness - adverse reaction:        17 min Therapeutic Exercise:  [] See flow sheet :   Rationale: increase ROM and increase strength to improve the patients ability to increase patients ADLs. 25 min Neuromuscular Re-education:  []  See flow sheet :   Rationale: increase strength, improve coordination, improve balance, and increase proprioception  to improve the patients ability to ambulate on all surfaces. With   [] TE   [] TA   [] neuro   [] other: Patient Education: [x] Review HEP    [] Progressed/Changed HEP based on:   [] positioning   [] body mechanics   [] transfers   [] heat/ice application    [] other:      Other Objective/Functional Measures: Gait is without deviation. He has a capsular end feel at end range knee flexion. Pain Level (0-10 scale) post treatment: 0    ASSESSMENT/Changes in Function: Patient with subjective decreased pain and tightness in his knees. Patient will continue to benefit from skilled PT services to modify and progress therapeutic interventions, address functional mobility deficits, address ROM deficits, analyze and address soft tissue restrictions, analyze and cue movement patterns, and analyze and modify body mechanics/ergonomics to attain remaining goals. [x]  See Plan of Care  []  See progress note/recertification  []  See Discharge Summary         Progress towards goals / Updated goals:  Short Term Goals: To be accomplished in 1-2 treatments:  1. Patient will become proficient in their HEP and will be compliant in performing that program.  Evaluation:   Patient given a written/illustrated HEP. Current:  Patient reports compliance with his HEP.  12/2/2022. Goal Met. Long Term Goals: To be accomplished in 12 treatments:  1.  Patient's pain level will be 0/10-3/10 with activity in order to improve patient's ability to perform normal ADLs. Evaluation:  0/10-8/10  Current:  0/10-5/10.  12/15/2022. Progressing. 2. Patient will demonstrate 0-110 degrees AROM left knee to increase ease of ADLs. Evaluation:  AROM left knee 0-102 degrees. Current:  PROM left knee 0-107 degrees. 12/2/2022. Progressing. 3. Patient will increase FOTO score to  65 to indicate increased functional mobility. Evaluation:  61  4. Patient will report little to no difficulty performing heavy activities around his home in order to increased his normal ADLs. .  Evaluation:  Notes moderate difficulty. Current:  Working on a home for [employer] for [employer] and did not have any difficulty with that yesterday. 12/8/2022. Goal Met.     PLAN  [x]  Upgrade activities as tolerated     [x]  Continue plan of care  []  Update interventions per flow sheet       []  Discharge due to:_  []  Other:_      Mj Mcnally, PT 12/15/2022  12:51 PM    Future Appointments   Date Time Provider Archie Richardson   12/15/2022  1:00 PM Tali Rock, PT [facility]   12/16/2022  7:00 AM [facility] Rock, PT [facility]   12/19/2022 10:00 AM Tali Jaspreet, PT [facility]   12/22/2022 10:00 AM Stone Keene, PTA [facility]   1/9/2023  8:30 AM MD [facility] AM[facility]   4/6/2023 12:15 PM Erin Grider MD [facility] BS AMB   5/2/2023  9:30 AM Cheryl Kevin MD [facility] AMB   12/4/2023 11:15 AM Erin Grider MD [facility] BS AMB

## 2022-12-16 ENCOUNTER — HOSPITAL ENCOUNTER (OUTPATIENT)
Dept: PHYSICAL THERAPY | Age: 67
Discharge: HOME | End: 2022-12-16
Payer: MEDICARE

## 2022-12-16 PROCEDURE — 97112 NEUROMUSCULAR REEDUCATION: CPT | Performed by: PHYSICAL THERAPIST

## 2022-12-16 PROCEDURE — 97110 THERAPEUTIC EXERCISES: CPT | Performed by: PHYSICAL THERAPIST

## 2022-12-16 NOTE — PROGRESS NOTES
PT DAILY TREATMENT NOTE     Patient Name: Chance Baca  Date:2022  : 1955  [x]  Patient  Verified  Payor: Leticia Lind / Plan: [facility] MEDIC[facility] COMPLETE / Product Type: Managed Care Medicare /    In time:6:58  Out time:7:53  Total Treatment Time (min): 55  Visit #: 6 of 12    Medicare/BCBS Only   Total Timed Codes (min):  45 1:1 Treatment Time:  45       Treatment Area: No admission diagnoses are documented for this encounter. SUBJECTIVE  Pain Level (0-10 scale): 0  Any medication changes, allergies to medications, adverse drug reactions, diagnosis change, or new procedure performed?: [x] No    [] Yes (see summary sheet for update)  Subjective functional status/changes:   [] No changes reported  Patient notes he has more stiffness in the patella femoral region where it used to be distal quad tightness. OBJECTIVE    Modality rationale: decrease edema, decrease inflammation, and decrease pain to improve the patients ability to increase tolerance to activity.    Min Type Additional Details    [] Estim:  []Unatt       []IFC  []Premod                        []Other:  []w/ice   []w/heat  Position:  Location:    [] Estim: []Att    []TENS instruct  []NMES                    []Other:  []w/US   []w/ice   []w/heat  Position:  Location:    []  Traction: [] Cervical       []Lumbar                       [] Prone          []Supine                       []Intermittent   []Continuous Lbs:  [] before manual  [] after manual    []  Ultrasound: []Continuous   [] Pulsed                           []1MHz   []3MHz W/cm2:  Location:    []  Iontophoresis with dexamethasone         Location: [] Take home patch   [] In clinic   10 [x]  Ice     []  heat  []  Ice massage  []  Laser   []  Anodyne Position: sitting  Location: right and left knees    []  Laser with stim  []  Other:  Position:  Location:    []  Vasopneumatic Device    []  Right     []  Left  Pre-treatment girth:  Post-treatment girth: Measured at (location):  Pressure:       [] lo [] med [] hi   Temperature: [] lo [] med [] hi   [] Skin assessment post-treatment:  []intact []redness- no adverse reaction    []redness - adverse reaction:     17 min Therapeutic Exercise:  [] See flow sheet :   Rationale: increase ROM and increase strength to improve the patients ability to increase patients ADLs. 28 min Neuromuscular Re-education:  []  See flow sheet :   Rationale: increase strength, improve coordination, improve balance, and increase proprioception  to improve the patients ability to ambulate on all surfaces. With   [] TE   [] TA   [] neuro   [] other: Patient Education: [x] Review HEP    [] Progressed/Changed HEP based on:   [] positioning   [] body mechanics   [] transfers   [] heat/ice application    [] other:      Other Objective/Functional Measures: AROM left knee 0-109 deg. Pain Level (0-10 scale) post treatment: 0    ASSESSMENT/Changes in Function: Patient with decreased subjective c/o pain and improved AROM. Patient will continue to benefit from skilled PT services to modify and progress therapeutic interventions, address functional mobility deficits, address ROM deficits, analyze and address soft tissue restrictions, analyze and cue movement patterns, and analyze and modify body mechanics/ergonomics to attain remaining goals. [x]  See Plan of Care  []  See progress note/recertification  []  See Discharge Summary         Progress towards goals / Updated goals:  Short Term Goals: To be accomplished in 1-2 treatments:  1. Patient will become proficient in their HEP and will be compliant in performing that program.  Evaluation:   Patient given a written/illustrated HEP. Current:  Patient reports compliance with his HEP.  12/2/2022. Goal Met. Long Term Goals: To be accomplished in 12 treatments:  1.  Patient's pain level will be 0/10-3/10 with activity in order to improve patient's ability to perform normal ADLs.  Evaluation:  0/10-8/10  Current:  0/10-5/10.  12/15/2022. Progressing. 2. Patient will demonstrate 0-110 degrees AROM left knee to increase ease of ADLs. Evaluation:  AROM left knee 0-102 degrees. Current:  AROM left knee 0-109 degrees. 12/16/2022. Progressing. 3. Patient will increase FOTO score to  65 to indicate increased functional mobility. Evaluation:  61  4. Patient will report little to no difficulty performing heavy activities around his home in order to increased his normal ADLs. .  Evaluation:  Notes moderate difficulty. Current:  Working on a home for [employer] and did not have any difficulty with that yesterday. 12/8/2022. Goal Met.     PLAN  [x]  Upgrade activities as tolerated     [x]  Continue plan of care  []  Update interventions per flow sheet       []  Discharge due to:_  []  Other:_      Hanh Burton, PT 12/16/2022  7:27 AM    Future Appointments   Date Time Provider Archie Richardson   12/19/2022 10:00 AM Tono Olguin, PT [facility]   12/22/2022 10:00 AM Trenton Perez PTA [facility]   1/9/2023  8:30 AM MD [facility] BS AMB   4/6/2023 12:15 PM Santy Grider MD [facility] BS AMB   5/2/2023  9:30 AM Archana Mitchell MD [facility] AMB   12/4/2023 11:15 AM Santy Grider MD [facility] BS AMB

## 2022-12-19 ENCOUNTER — HOSPITAL ENCOUNTER (OUTPATIENT)
Dept: PHYSICAL THERAPY | Age: 67
Discharge: HOME | End: 2022-12-19
Attending: ORTHOPAEDIC SURGERY
Payer: MEDICARE

## 2022-12-19 PROCEDURE — 97112 NEUROMUSCULAR REEDUCATION: CPT | Performed by: PHYSICAL THERAPIST

## 2022-12-19 PROCEDURE — 97110 THERAPEUTIC EXERCISES: CPT | Performed by: PHYSICAL THERAPIST

## 2022-12-19 NOTE — PROGRESS NOTES
PT DAILY TREATMENT NOTE     Patient Name: Peggy Mancia  Date:2022  : 1955  [x]  Patient  Verified  Payor: [facility] MEDICARE COMPLETE / Plan: [facility] MEDICARE COMPLETE / Product Type: Managed Care Medicare /    In time:9:56  Out time:10:48  Total Treatment Time (min): 52  Visit #: 7 of 12    Medicare/BCBS Only   Total Timed Codes (min):  42 1:1 Treatment Time:  34       Treatment Area: Left knee pain [M25.562]  Right knee pain [M25.561]    SUBJECTIVE  Pain Level (0-10 scale): 0-1  Any medication changes, allergies to medications, adverse drug reactions, diagnosis change, or new procedure performed?: [x] No    [] Yes (see summary sheet for update)  Subjective functional status/changes:   [] No changes reported  Patient reports functionally, he is coming along, notes very little he cannot do. Notes some stiffness at the distal quads and knee joints but hat is better than it has been when he started therapy. OBJECTIVE    Modality rationale: decrease edema, decrease inflammation, and decrease pain to improve the patients ability to increase tolerance to activity.    Min Type Additional Details    [] Estim:  []Unatt       []IFC  []Premod                        []Other:  []w/ice   []w/heat  Position:  Location:    [] Estim: []Att    []TENS instruct  []NMES                    []Other:  []w/US   []w/ice   []w/heat  Position:  Location:    []  Traction: [] Cervical       []Lumbar                       [] Prone          []Supine                       []Intermittent   []Continuous Lbs:  [] before manual  [] after manual    []  Ultrasound: []Continuous   [] Pulsed                           []1MHz   []3MHz W/cm2:  Location:    []  Iontophoresis with dexamethasone         Location: [] Take home patch   [] In clinic   10 [x]  Ice     []  heat  []  Ice massage  []  Laser   []  Anodyne Position:supine   Location:both knees    []  Laser with stim  []  Other:  Position:  Location:    []  Vasopneumatic Device []  Right     []  Left  Pre-treatment girth:  Post-treatment girth:  Measured at (location):  Pressure:       [] lo [] med [] hi   Temperature: [] lo [] med [] hi   [] Skin assessment post-treatment:  []intact []redness- no adverse reaction    []redness - adverse reaction:     17 min Therapeutic Exercise:  [] See flow sheet :   Rationale: increase ROM and increase strength to improve the patients ability to increase patients ADLs. 25 min Neuromuscular Re-education:  []  See flow sheet :   Rationale: increase strength, improve coordination, improve balance, and increase proprioception  to improve the patients ability to ambulate on all surfaces. With   [] TE   [] TA   [] neuro   [] other: Patient Education: [x] Review HEP    [] Progressed/Changed HEP based on:   [] positioning   [] body mechanics   [] transfers   [] heat/ice application    [] other:      Other Objective/Functional Measures: AROM left knee 0-112 degrees. Pain Level (0-10 scale) post treatment: 0    ASSESSMENT/Changes in Function: Patient with subjective improvement in his function with decreased knee pain. Continues to note stiffness versus pain. Patient will continue to benefit from skilled PT services to modify and progress therapeutic interventions, address functional mobility deficits, address ROM deficits, analyze and address soft tissue restrictions, analyze and cue movement patterns, and analyze and modify body mechanics/ergonomics to attain remaining goals. [x]  See Plan of Care  []  See progress note/recertification  []  See Discharge Summary         Progress towards goals / Updated goals:  Short Term Goals: To be accomplished in 1-2 treatments:  1. Patient will become proficient in their HEP and will be compliant in performing that program.  Evaluation:   Patient given a written/illustrated HEP. Current:  Patient reports compliance with his HEP.  12/2/2022. Goal Met. Long Term Goals:  To be accomplished in 12 treatments:  1. Patient's pain level will be 0/10-3/10 with activity in order to improve patient's ability to perform normal ADLs. Evaluation:  0/10-8/10  Current:  0/10-4/10.  12/19/2022. Progressing. 2. Patient will demonstrate 0-110 degrees AROM left knee to increase ease of ADLs. Evaluation:  AROM left knee 0-102 degrees. Current:  AROM left knee 0-112 degrees. 12/19/2022. Goal Met.  3. Patient will increase FOTO score to  65 to indicate increased functional mobility. Evaluation:  61  4. Patient will report little to no difficulty performing heavy activities around his home in order to increased his normal ADLs. .  Evaluation:  Notes moderate difficulty. Current:  Working on a home for [employer] for [employer] and did not have any difficulty with that yesterday. 12/8/2022. Goal Met.     PLAN  []  Upgrade activities as tolerated     []  Continue plan of care  []  Update interventions per flow sheet       []  Discharge due to:_  []  Other:_      Theodore Charles, PT 12/19/2022  10:02 AM    Future Appointments   Date Time Provider Archie Debra   12/22/2022 10:00 AM Gregoria Fleming PTA [facility]   1/9/2023  8:30 AM MD RACHELE Posada BS AMB   4/6/2023 12:15 PM Srinivasa Grider MD [facility] BS AMB   5/2/2023  9:30 AM Pedro Aguilera MD [facility] BS AMB   12/4/2023 11:15 AM Srinivasa Grider MD [facility] BS AMB

## 2022-12-22 ENCOUNTER — HOSPITAL ENCOUNTER (OUTPATIENT)
Dept: PHYSICAL THERAPY | Age: 67
Discharge: HOME | End: 2022-12-22
Attending: ORTHOPAEDIC SURGERY
Payer: MEDICARE

## 2022-12-22 PROCEDURE — 97110 THERAPEUTIC EXERCISES: CPT

## 2022-12-22 PROCEDURE — 97112 NEUROMUSCULAR REEDUCATION: CPT

## 2022-12-22 NOTE — THERAPY DISCHARGE
[facility]              [address]        [place], [address]   [phone] fax    Discharge Summary  Patient name: Qian Todd Start of Care: 2022   Referral source: Sachin GandhiDO : 1955   Medical/Treatment Diagnosis: Left knee pain [M25.562]  Right knee pain [M25.561]  Payor: [facility] MEDICARE COMPLETE / Plan: [facility] MEDICARE COMPLETE / Product Type: Managed Care Medicare /  Onset Date:2022     Prior Hospitalization: see medical history Provider#: [id number]   Medications: Verified on Patient Summary List    Comorbidities: Back pain, BMI 34.0, Diabetes, PVD, Prior surgery, Visual Impairment. Prior Level of Function: Retired, works with [employer] for [employer]. Visits from Start of Care: 8    Missed Visits: 0  Reporting Period : 2022 to 2022    Summary of Care:  Short Term Goals: To be accomplished in 1-2 treatments:  1. Patient will become proficient in their HEP and will be compliant in performing that program.  Evaluation:   Patient given a written/illustrated HEP. Current:  Goal Met. Patient reports compliance with his HEP.  2022. Long Term Goals: To be accomplished in 12 treatments:  1. Patient's pain level will be 0/10-3/10 with activity in order to improve patient's ability to perform normal ADLs. Evaluation:  0/10-8/10  Current:  Progressing. 0/10-4/10.  2022.   2. Patient will demonstrate 0-110 degrees AROM left knee to increase ease of ADLs. Evaluation:  AROM left knee 0-102 degrees. Current:   Goal Met. AROM left knee 0-112 degrees. 2022. 3. Patient will increase FOTO score to  65 to indicate increased functional mobility. Evaluation:  61  Current: Regressed: FOTO = 57.   4. Patient will report little to no difficulty performing heavy activities around his home in order to increased his normal ADLs. .  Evaluation:  Notes moderate difficulty.   Current: Goal Met. Working on a home for [employer] and did not have any difficulty with that yesterday. 12/8/2022.        ASSESSMENT/RECOMMENDATIONS:  [x]Discontinue therapy: [x]Patient has reached or is progressing toward set goals      []Patient is non-compliant or has abdicated      []Due to lack of appreciable progress towards set goals    Kameron Cerna, PT 12/22/2022 11:26 AM

## 2022-12-22 NOTE — PROGRESS NOTES
PT DAILY TREATMENT NOTE     Patient Name: Jaime Tamez  Date:2022  : 1955  [x]  Patient  Verified  Payor: [employer] / Plan: [facility] MEDICARE COMPLETE / Product Type: Managed Care Medicare /    In time:10:00  Out time:11:01  Total Treatment Time (min): 61  Visit #: 8 of 12    Medicare/BCBS Only   Total Timed Codes (min):  51 1:1 Treatment Time:  30       Treatment Area: Left knee pain [M25.562]  Right knee pain [M25.561]    SUBJECTIVE  Pain Level (0-10 scale): 2  Any medication changes, allergies to medications, adverse drug reactions, diagnosis change, or new procedure performed?: [x] No    [] Yes (see summary sheet for update)  Subjective functional status/changes:   [] No changes reported  Patient reports he has noticed improvement with overall pain. OBJECTIVE              Modality rationale: decrease edema, decrease inflammation, and decrease pain to improve the patients ability to increase tolerance to activity. Min Type Additional Details   10 [x]  Ice     []  heat  []  Ice massage  []  Laser   []  Anodyne Position:supine   Location:both knees   [] Skin assessment post-treatment:  []intact []redness- no adverse reaction    []redness - adverse reaction:      26 min Therapeutic Exercise:  [] See flow sheet :   Rationale: increase ROM and increase strength to improve the patients ability to increase patients ADLs. 25 min Neuromuscular Re-education:  []  See flow sheet :   Rationale: increase strength, improve coordination, improve balance, and increase proprioception  to improve the patients ability to ambulate on all surfaces.           With   [] TE   [] TA   [] neuro   [] other: Patient Education: [x] Review HEP    [] Progressed/Changed HEP based on:   [] positioning   [] body mechanics   [] transfers   [] heat/ice application    [] other:      Other Objective/Functional Measures: see goals      Pain Level (0-10 scale) post treatment: 0    ASSESSMENT/Changes in Function: Patient states he feels 85% better with overall symptoms. Patient has improved knee mobility to [facility]. Provided patient with updated HEP to continues independently. []  See Plan of Care  []  See progress note/recertification  [x]  See Discharge Summary         Progress towards goals / Updated goals:  Short Term Goals: To be accomplished in 1-2 treatments:  1. Patient will become proficient in their HEP and will be compliant in performing that program.  Evaluation:   Patient given a written/illustrated HEP. Current:  Goal Met. Patient reports compliance with his HEP.  12/2/2022. Long Term Goals: To be accomplished in 12 treatments:  1. Patient's pain level will be 0/10-3/10 with activity in order to improve patient's ability to perform normal ADLs. Evaluation:  0/10-8/10  Current:  Progressing. 0/10-4/10.  12/19/2022.   2. Patient will demonstrate 0-110 degrees AROM left knee to increase ease of ADLs. Evaluation:  AROM left knee 0-102 degrees. Current:   Goal Met. AROM left knee 0-112 degrees. 12/19/2022. 3. Patient will increase FOTO score to  65 to indicate increased functional mobility. Evaluation:  61  Current: Regressed: FOTO = 57.   4. Patient will report little to no difficulty performing heavy activities around his home in order to increased his normal ADLs. .  Evaluation:  Notes moderate difficulty. Current:  Goal Met. Working on a home for [employer] and did not have any difficulty with that yesterday. 12/8/2022.      PLAN  []  Upgrade activities as tolerated     [x]  Continue plan of care  []  Update interventions per flow sheet       []  Discharge due to:_  []  Other:_      Kelly Nixon PTA 12/22/2022  8:53 AM    Future Appointments   Date Time Provider Archie Richardson   12/22/2022 10:00 AM Anthony Pendleton PTA [facility]   1/9/2023  8:30 AM Palma Becerra MD RACHELE BS AMB   4/6/2023 12:15 PM Erin Grider MD [facility] BS AMB   5/2/2023  9:30 AM Fern Leal MD [facility] BRANDI BS AMB   12/4/2023 11:15 AM Malena Grider MD [facility] BS AMB

## 2023-01-09 ENCOUNTER — OFFICE VISIT (OUTPATIENT)
Dept: ORTHOPEDIC SURGERY | Age: 68
End: 2023-01-09
Payer: MEDICARE

## 2023-01-09 VITALS
WEIGHT: 232.6 LBS | SYSTOLIC BLOOD PRESSURE: 133 MMHG | OXYGEN SATURATION: 96 % | HEIGHT: 69 IN | DIASTOLIC BLOOD PRESSURE: 87 MMHG | TEMPERATURE: 97.8 F | RESPIRATION RATE: 16 BRPM | BODY MASS INDEX: 34.45 KG/M2 | HEART RATE: 80 BPM

## 2023-01-09 DIAGNOSIS — M54.41 CHRONIC BILATERAL LOW BACK PAIN WITH BILATERAL SCIATICA: Primary | ICD-10-CM

## 2023-01-09 DIAGNOSIS — M47.816 SPONDYLOSIS WITHOUT MYELOPATHY OR RADICULOPATHY, LUMBAR REGION: ICD-10-CM

## 2023-01-09 DIAGNOSIS — M54.42 CHRONIC BILATERAL LOW BACK PAIN WITH BILATERAL SCIATICA: Primary | ICD-10-CM

## 2023-01-09 DIAGNOSIS — M54.16 LUMBAR NEURITIS: ICD-10-CM

## 2023-01-09 DIAGNOSIS — M51.36 DDD (DEGENERATIVE DISC DISEASE), LUMBAR: ICD-10-CM

## 2023-01-09 DIAGNOSIS — G89.29 CHRONIC BILATERAL LOW BACK PAIN WITH BILATERAL SCIATICA: Primary | ICD-10-CM

## 2023-01-09 PROCEDURE — 99204 OFFICE O/P NEW MOD 45 MIN: CPT | Performed by: PHYSICAL MEDICINE & REHABILITATION

## 2023-01-09 PROCEDURE — G8432 DEP SCR NOT DOC, RNG: HCPCS | Performed by: PHYSICAL MEDICINE & REHABILITATION

## 2023-01-09 PROCEDURE — G8417 CALC BMI ABV UP PARAM F/U: HCPCS | Performed by: PHYSICAL MEDICINE & REHABILITATION

## 2023-01-09 PROCEDURE — 72100 X-RAY EXAM L-S SPINE 2/3 VWS: CPT | Performed by: PHYSICAL MEDICINE & REHABILITATION

## 2023-01-09 PROCEDURE — 1123F ACP DISCUSS/DSCN MKR DOCD: CPT | Performed by: PHYSICAL MEDICINE & REHABILITATION

## 2023-01-09 PROCEDURE — G8536 NO DOC ELDER MAL SCRN: HCPCS | Performed by: PHYSICAL MEDICINE & REHABILITATION

## 2023-01-09 PROCEDURE — 3017F COLORECTAL CA SCREEN DOC REV: CPT | Performed by: PHYSICAL MEDICINE & REHABILITATION

## 2023-01-09 PROCEDURE — G8427 DOCREV CUR MEDS BY ELIG CLIN: HCPCS | Performed by: PHYSICAL MEDICINE & REHABILITATION

## 2023-01-09 PROCEDURE — 1101F PT FALLS ASSESS-DOCD LE1/YR: CPT | Performed by: PHYSICAL MEDICINE & REHABILITATION

## 2023-01-09 NOTE — LETTER
1/9/2023    Patient: Marge Ortiz   YOB: 1955   Date of Visit: 1/9/2023     Dinorah Andre MD  [address]  [place] [place] [address]  [address] [place] [address]  [place]    Dear MD Angle Eason DO,      Thank you for referring Mr. Marge Ortiz to [address] for evaluation. My notes for this consultation are attached. If you have questions, please do not hesitate to call me. I look forward to following your patient along with you.       Sincerely,    Nisa Pool MD

## 2023-01-09 NOTE — PROGRESS NOTES
[facility]  [address]  [facility]  [place], [address]  Phone: [phone]  Fax: [phone]        INITIAL CONSULTATION      HISTORY OF PRESENT ILLNESS:  Dany Handy is a 75 y.o. male whom is referred from Dr. Inessa España secondary to low back pain radiating to the BLE. He rates his pain 2-7/10. Patient comes into the office with c/o low back pain radiating to the BLE(L=R) laterally to the knee, does not go below the knee. Patient says pain started 5-6 years ago without injury or trauma. Patient says the pain has progressively gotten worse. Patient says pain is intermittent, and notes that it is worst in the morning. His pain is exacerbated by standing or sitting for long periods of time. Patient also says his pain varies depending on activity. Patient reports a stabbing pain is his low back pain. Patient denies recent fevers, weight loss, rashes or skin sores. No stomach ulcers or bleeding disorders. No history of spinal surgery or injections. Patient denies recent physical therapy or chiropractic care in the past. Patient reports that to his knowledge his kidneys function properly. Pt denies change in bowel or bladder habits. The patient has a history of DM and reports blood sugars are well controlled, consistently remaining below 200, and is followed by Dr. Rahel Guzman, PCP, for his DM. Patient says he had a cortisol injection in his knees and his hand, and says he tolerated it well. Patient says he takes tylenol with minimal relief. Patient says he has some kind of implantable device, but he is unsure if it is MRI compatible. Patient is allergic to MDP due to muscle weakness. Patient previously tolerated Gabapentin 100 mg TID in the past for numbness and tingling with some relief. PmHx of Coronary disease, Glaucoma, A-fib, Former Smoker, Bypass Grafting, DM, HTN. Note from Dr. Inessa España dated 11/7/2022 indicating patient was seen with c/o bilateral knee pain.  Hx of Bilateral knee arthoplasty. Referred to me and to PT. See patient back as needed.  reviewed. Body mass index is 34.35 kg/m². PCP: Tara Lynn MD    Past Medical History:   Diagnosis Date    Bypass graft mechanical complication ([facility] [address]     quadropple    Diabetes ([facility] [address]     Glaucoma     History of staph infection     knee    HTN (hypertension)     Hyperlipemia     SOB (shortness of breath)           Past Surgical History:   Procedure Laterality Date    COLONOSCOPY N/A 2021    COLONOSCOPY performed by Pepe Jackson MD at [address] [facility]      HX COLONOSCOPY      normal    HX CORONARY ARTERY BYPASS GRAFT      HX CYST REMOVAL      from back    HX HERNIA REPAIR Bilateral     HX KNEE REPLACEMENT  2017 and 2018    rigjt and left replacement          Social History     Tobacco Use    Smoking status: Former     Packs/day: 0.50     Years: 2.00     Pack years: 1.00     Types: Cigarettes     Quit date: 1970     Years since quittin.0    Smokeless tobacco: Never   Substance Use Topics    Alcohol use: Yes     Alcohol/week: 1.0 standard drink     Types: 1 Shots of liquor per week     Comment: social       Work status: The patient is retired. Marital status: N/A. Current Outpatient Medications   Medication Sig Dispense Refill    isosorbide mononitrate ER (IMDUR) 30 mg tablet TAKE 1 TABLET BY MOUTH  DAILY 90 Tablet 3    colesevelam (WELCHOL) 625 mg tablet TAKE 2 TABLETS BY MOUTH EVERY  Tablet 1    metoprolol succinate (TOPROL-XL) 100 mg tablet Take 1 Tablet by mouth nightly. 90 Tablet 1    rosuvastatin (CRESTOR) 5 mg tablet TAKE 1 TABLET BY MOUTH AT  NIGHT 90 Tablet 3    metFORMIN ER (GLUCOPHAGE XR) 500 mg tablet TAKE 1 TABLET BY MOUTH  TWICE DAILY WITH MEALS 180 Tablet 3    lisinopriL (PRINIVIL, ZESTRIL) 5 mg tablet Take 5 mg by mouth daily. latanoprost (XALATAN) 0.005 % ophthalmic solution Administer 1 Drop to both eyes nightly.       multivitamin, tx-iron-ca-min (THERA-M w/ IRON) 9 mg iron-400 mcg tab tablet Take 1 Tab by mouth daily. acetaminophen (TYLENOL) 500 mg tablet Take 500 mg by mouth every six (6) hours as needed for Pain.      lancets misc Check blood glucose  daily. Patient would like One Touch Delica Plus Lancets 024 Each 3    glucose blood VI test strips (blood glucose test) strip Check blood glucose  daily. Patient would like One Touch Yashira test strips 100 Strip 3    aspirin delayed-release 81 mg tablet Take 81 mg by mouth daily. co-enzyme Q-10 (CO Q-10) 100 mg capsule Take 100 mg by mouth daily. Allergies   Allergen Reactions    Atorvastatin Other (comments)     Muscle weakness    Heparin Other (comments)     thrombocytopenia    Heparin Analogues Other (comments)     thrombocytopenia    Medrol [Methylprednisolone] Other (comments)     Muscle weakness    Statins-Hmg-Coa Reductase Inhibitors Other (comments)    Zetia [Ezetimibe] Other (comments)     Muscle joint pain    Zocor [Simvastatin] Other (comments)     Muscle joint pain            Family History   Problem Relation Age of Onset    Heart Attack Father     Heart Disease Sister     Heart Attack Brother     Leukemia Brother     Anemia Brother          REVIEW OF SYSTEMS  Constitutional symptoms: Negative  Eyes: Negative  Ears, Nose, Throat, and Mouth: Negative  Cardiovascular: Negative  Respiratory: Negative  Genitourinary: Negative  Integumentary (Skin and/or breast): Negative  Musculoskeletal: Positive for low back pain radiating to the BLE(L=R) laterally to the knee, does not go below the knee. Extremities: Negative for edema.   Endocrine/Rheumatologic: Negative  Hematologic/Lymphatic: Negative  Allergic/Immunologic: Negative  Psychiatric: Negative       PHYSICAL EXAMINATION  Visit Vitals  /87   Pulse 80   Temp 97.8 °F (36.6 °C) (Temporal)   Resp 16   Ht 5' 9\" (1.753 m)   Wt 232 lb 9.6 oz (105.5 kg)   SpO2 96%   BMI 34.35 kg/m²       CONSTITUTIONAL: NAD, A&O x 3  HEART: Regular rate and rhythm  GASTROINTESTINAL: Positive bowel sounds, soft, nontender, and nondistended  LUNGS: Clear to auscultation bilaterally. SKIN: Negative for rash. RANGE OF MOTION: The patient has full passive range of motion in all four extremities. SENSATION: Sensation is intact to light touch throughout. MOTOR:   Straight Leg Raise: Negative, bilateral  [place]: Negative, bilateral  Tandem Gait:  not tested    Deep tendon reflexes are 0 at the biceps, 0 at the brachioradialis and 0 at the triceps, bilaterally. Deep tendon reflexes are 0 at the knees and 0 at the ankles bilaterally. Ambulates without assistive device. Shoulder AB/Flex Elbow Flex Wrist Ext Elbow Ext Wrist Flex Hand Intrin Tone   Right +4/5 +4/5 +4/5 +4/5 +4/5 +4/5 +4/5   Left +4/5 +4/5 +4/5 +4/5 +4/5 +4/5 +4/5              Hip Flex Knee Ext Knee Flex Ankle DF GTE Ankle PF Tone   Right +4/5 +4/5 +4/5 +4/5 +4/5 +4/5 +4/5   Left +4/5 +4/5 +4/5 +4/5 +4/5 +4/5 +4/5     RADIOGRAPHS  Lumbar spine plain films dated 1/9/2023. 2 views: AP and lateral. Revealed:  Left iliac crest elevated compared to the right. Slight retrolisthesis L1 on L2 and L2 on L3. Pan lumbar degenerative disc disease. ASSESSMENT   Diagnoses and all orders for this visit:    1. Chronic bilateral low back pain with bilateral sciatica  -     AMB POC XRAY, SPINE, LUMBOSACRAL; 2 O    2. Spondylosis without myelopathy or radiculopathy, lumbar region    3. Lumbar neuritis    4. DDD (degenerative disc disease), lumbar         IMPRESSIONS/RECOMMENDATIONS:  Patient presents today with c/o low back pain radiating to the BLE(L=R) laterally to the knee, does not go below the knee. Multiple treatment options were discussed. Patient says he has some kind of implantable device, but he is unsure if it is MRI compatible. I will request that the patient brings in papers for this at the next office visit. I will refer him to physical therapy with an emphasis on HEP.  I advised the patient to self advocate for themselves at PT. Pt declined neuropathic medication. Patient is not interested in surgery or injection at this time. Patient is neurologically intact. I will see the patient back in 6 week's time or earlier if needed. Written by Daniel Finley, as dictated by Rolando Brooks MD.  I, Dr. Rolando Brooks, examined the patient, reviewed and agree with the note.

## 2023-01-26 ENCOUNTER — HOSPITAL ENCOUNTER (OUTPATIENT)
Dept: PHYSICAL THERAPY | Age: 68
Discharge: HOME | End: 2023-01-26
Attending: PHYSICAL MEDICINE & REHABILITATION
Payer: MEDICARE

## 2023-01-26 PROCEDURE — 97110 THERAPEUTIC EXERCISES: CPT

## 2023-01-26 PROCEDURE — 97161 PT EVAL LOW COMPLEX 20 MIN: CPT

## 2023-01-26 PROCEDURE — 97535 SELF CARE MNGMENT TRAINING: CPT

## 2023-01-26 NOTE — PROGRESS NOTES
PT DAILY TREATMENT NOTE     Patient Name: Peggy Mancia  Date:2023  : 1955  [x]  Patient  Verified  Payor: Alfred Born / Plan: [facility] MEDICARE COMPLETE / Product Type: Managed Care Medicare /    In time:935  Out time:1015  Total Treatment Time (min): 40  Visit #: 1 of 8    Medicare/BCBS Only   Total Timed Codes (min):  23 1:1 Treatment Time:  40       Treatment Area: Other low back pain [M54.59]    Physical Therapy Evaluation - Lumbar    SUBJECTIVE      Any medication changes, allergies to medications, adverse drug reactions, diagnosis change, or new procedure performed?: [x] No    [] Yes (see summary sheet for update)    Subjective functional status/changes:     PLOF: Retired, Ambulation without AD, No Hx Falls, [employer] (Volunteer, 2x/week), (I) Self-Care ADLs, Active with [employer]  Current Functional Status: Difficulty with household ADLs, Difficulty with community ADLs  Work Hx: Retired   Living Situation: Lives with family  Comorbidities: Diabetes mellitus, BMI>30, Coronary Disease, Glaucoma, Atrial fibrillation, Former Smoker, Quadruple Cardiac Bypass (2019), HTN, Left/Right TKA (2017, 2018), Arthritis, Peripheral Neuropathy  Medications: Tylenol (PRN)     Subjective: Patient presents with low back pain with symptoms radiating into bilateral LE. (Left = right). Patient reports isolated bilateral knee pain. Patient denies LE N/T but does report some episodic knee buckling. Onset of symptoms 5-6 years ago  (~) without known injury or trauma. Patient reports that pain has progressively worsened with symptoms intermittent in nature and worse in the AM. Increase in pain reported with standing and siting for prolonged periods. Patient reports left lumbar pain which radiates intermittently into the left scapula. Patient reports provocation of symptoms with lumbar flexion.  Patient reports intermittent night-time pain but denies sleep disturbances secondary to symptoms. Pain Intensity (0-10, VAS): Current 3, Worst 7, Best 2    Patient Goals: \"Less pain. \"    Kristine Guzman (per MD note 1/9/2023)  Lumbar spine plain films dated 1/9/2023. 2 views: AP and lateral. Revealed:  Left iliac crest elevated compared to the right. Slight retrolisthesis L1 on L2 and L2 on L3. Pan lumbar degenerative disc disease.     OBJECTIVE EXAMINATION    BP: 142/80 mmHg  Posture: No observable lateral trunk shift with symmetrical weightbearing through bilateral LE    Gait:  [x] Normal     [] Abnormal:    Functional Tests:  SLS: Left SLS 6 sec / Right SLS < 2 sec  Bilateral Squat: Ability to achieve full AROM without symptom provocation    Lumbar Active Movements: [] N/A   [] Too acute   [] Other:  ROM % AROM Comments   Forward flexion 40-60 25% limited Left lumbar pain 2/10   Extension 20-30 10% limited    SB right 20-30 50% limited    SB left 20-30 50% limited      Neuro Screen [] WNL  Dermatome: Intact and symmetrical to light touch bilaterally L2-S1  Reflexes: 0+ L/R Achilles, 0+ L/R Patellar    Palpation: TTP Left SIJ    LE MMT    Left Right   Hip Flexion 5 5    Abduction 4+ 4+    ER 5 5    IR 5 5   Knee Flexion 5 5    Extension 5 5   Ankle Dorsiflexion 5 5    Hallux Ext 5 5    Ankle Eversion 5 5   *Assessed in supine    Special Tests       Hip: Donice Flies:  [] R    [x] L    [x] +    [] -     Scour:  [] R    [x] L    [x] +    [] -     FADDIR: [] R    [x] L    [x] +    [] -    Comments: Left Hip - Limited hip IR and flexion (abnormal firm end-feel)    OBJECTIVE      17 min [x]Eval                  []Re-Eval     15 min Therapeutic Exercise:  [x] See flow sheet : Patient educated regarding completion of prescribed HEP and provided with written HEP instructions, Patient educated regarding diagnosis and PT POC   Rationale: increase ROM and increase strength to improve the patients ability to improve ease with functional ADLs    8 min Self Care/Home Management:   Discussion re: importance of regular foot checks secondary to diabetic peripheral neuropathy  Discussion re: importance of regular physical activity    Rationale: increase ROM and increase strength  to improve the patients ability to optimize function          With   [] TE   [] TA   [] neuro   [] other: Patient Education: [x] Review HEP    [] Progressed/Changed HEP based on:   [] positioning   [] body mechanics   [] transfers   [] heat/ice application    [] other:      Other Objective/Functional Measures: See objective above. Pain Level (0-10 scale) post treatment: 3    ASSESSMENT/Changes in Function: Patient presents with s/s consistent with lumbar hypomobility with patient objectively demonstrating left hip capsular restrictions. Therapist questioning correlation of two regions of impairments secondary to altered gait mechanics due to mobility loss. With neurological screen WNL with patient subjectively reporting isolated left lumbar symptoms without radicular pain. With non-specific loss of lumbar AROM with poor segmental mobility. To emphasize improvements in available spinal and left hip functional AROM to optimize lumbopelvic mechanics and enable patient to participate in functional and recreational ADLS without restriction with patient with desire to maintain active lifestyle. Patient will continue to benefit from skilled PT services to modify and progress therapeutic interventions, address functional mobility deficits, address ROM deficits, address strength deficits, analyze and address soft tissue restrictions, analyze and cue movement patterns, analyze and modify body mechanics/ergonomics, assess and modify postural abnormalities, address imbalance/dizziness, and instruct in home and community integration to attain remaining goals. [x]  See Plan of Care  []  See progress note/recertification  []  See Discharge Summary         Progress towards goals / Updated goals:    Short Term Goals: To be accomplished in 2 weeks:  1.  Patient will subjectively report full compliance with prescribed HEP. Eval: HEP provided  2. Patient will demonstrate standing lumbar flexion AROM </= 25% limited without pain to improve ease with functional lifting. Eval: Standing Lumbar Flexion AROM = 25% limited (left lumbar pain 2/10)  3. Patient will demonstrate left/right hip abduction MMT 5/5 to improve ease with recreational ADLs. Eval: Left/Right Hip Abduction MMT = 4+/5           Long Term Goals: To be accomplished in 4 weeks:  1. Patient will demonstrate a significant functional improvement as demonstrated by a score of >/= 65 on FOTO. Eval: FOTO = 57       2. Patient will subjectively report symptoms at worst in last week </= 3/10 to improve overall quality of life. Eval: Pain at Worst (last week) = 7/10  3. Patient will demonstrate ability to perform squat with box lift (box + 10 lb) x10 repetitions without pain to improve ease with heavy labor.   Eval: NT      PLAN  [x]  Upgrade activities as tolerated     []  Continue plan of care  []  Update interventions per flow sheet       []  Discharge due to:_  []  Other:_      Bernard Collins, PT 1/26/2023  7:39 AM    Future Appointments   Date Time Provider Archie Debra   1/26/2023  9:30 AM Karina Doyle [facility]   2/20/2023  9:20 AM MD RACHELE Banks BS AMB   4/6/2023 12:15 PM Chapito Grider MD [facility] BS AMB   5/2/2023  9:30 AM Petr Villalta MD BRANDI BS AMB   12/4/2023 11:15 AM Chapito Grider MD [facility] BS AMB

## 2023-01-26 NOTE — PROGRESS NOTES
[facility] - [facility]              [address]        [place], [address]   [phone] fax    Plan of Care/ Statement of Necessity for Physical Therapy Services    Patient name: Nanci Stratton Start of Care: 2023   Referral source: Johanny Mandujano MD : 1955    Medical Diagnosis: Other low back pain [M54.59]  Payor: [address] [place] [address] / Plan: [address] / Product Type: [email or web address] Care Medicare /  Onset Date:    Treatment Diagnosis: Low Back Pain   Prior Hospitalization: see medical history Provider#: [id number]   Medications: Verified on Patient summary List    Comorbidities: Diabetes mellitus, BMI>30, Coronary Disease, Glaucoma, Atrial fibrillation, Former Smoker, Quadruple Cardiac Bypass (), HTN, Left/Right TKA (, ), Arthritis, Peripheral Neuropathy   Prior Level of Function: Retired, Ambulation without AD, No Hx Falls, [employer] (Volunteer, 2x/week), (I) Self-Care ADLs, Active with [address] and following information is based on the information from the initial evaluation. Assessment:    Patient presents with s/s consistent with lumbar hypomobility with patient objectively demonstrating left hip capsular restrictions. Therapist questioning correlation of two regions of impairments secondary to altered gait mechanics due to mobility loss. With neurological screen WNL with patient subjectively reporting isolated left lumbar symptoms without radicular pain. With non-specific loss of lumbar AROM with poor segmental mobility. To emphasize improvements in available spinal and left hip functional AROM to optimize lumbopelvic mechanics and enable patient to participate in functional and recreational ADLS without restriction with patient with desire to maintain active lifestyle.       Patient will continue to benefit from skilled PT services to modify and progress therapeutic interventions, address functional mobility deficits, address ROM deficits, address strength deficits, analyze and address soft tissue restrictions, analyze and cue movement patterns, analyze and modify body mechanics/ergonomics, assess and modify postural abnormalities, address imbalance/dizziness, and instruct in home and community integration to attain remaining goals.     Key Information:    BP: 142/80 mmHg  Posture: No observable lateral trunk shift with symmetrical weightbearing through bilateral LE     Gait:                [x] Normal        [] Abnormal:     Functional Tests:  SLS: Left SLS 6 sec / Right SLS < 2 sec  Bilateral Squat: Ability to achieve full AROM without symptom provocation     Lumbar Active Movements: [] N/A   [] Too acute   [] Other:  ROM % AROM Comments   Forward flexion 40-60 25% limited Left lumbar pain 2/10   Extension 20-30 10% limited     SB right 20-30 50% limited     SB left 20-30 50% limited        Neuro Screen [] WNL  Dermatome: Intact and symmetrical to light touch bilaterally L2-S1  Reflexes: 0+ L/R Achilles, 0+ L/R Patellar     Palpation: TTP Left SIJ     LE MMT      Left Right   Hip Flexion 5 5     Abduction 4+ 4+     ER 5 5     IR 5 5   Knee Flexion 5 5     Extension 5 5   Ankle Dorsiflexion 5 5     Hallux Ext 5 5     Ankle Eversion 5 5   *Assessed in supine     Special Tests       Hip:            Siomara Hals:              [] R    [x] L    [x] +    [] -                           Scour:              [] R    [x] L    [x] +    [] -                           FADDIR:          [] R    [x] L    [x] +    [] -               Comments: Left Hip - Limited hip IR and flexion (abnormal firm end-feel)    Evaluation Complexity History MEDIUM  Complexity : 1-2 comorbidities / personal factors will impact the outcome/ POC ; Examination LOW Complexity : 1-2 Standardized tests and measures addressing body structure, function, activity limitation and / or participation in recreation  ;Presentation LOW Complexity : Stable, uncomplicated  ;Clinical Decision Making MEDIUM Complexity : FOTO score of 26-74  Overall Complexity Rating: LOW   Problem List: pain affecting function, decrease ROM, decrease strength, impaired gait/ balance, decrease ADL/ functional abilitiies, decrease activity tolerance, decrease flexibility/ joint mobility, and decrease transfer abilities   Treatment Plan may include any combination of the following: Therapeutic exercise, Neuromuscular reeducation, Manual therapy, Therapeutic activity, Self care/home management, Electric stim unattended , Gait training, and Electric stim attended  Patient / Family readiness to learn indicated by: asking questions, trying to perform skills, and interest  Persons(s) to be included in education: patient (P)  Barriers to Learning/Limitations: None  Patient Goal (s): Less pain  Patient Self Reported Health Status: fair  Rehabilitation Potential: good    Short Term Goals: To be accomplished in 2 weeks:  1. Patient will subjectively report full compliance with prescribed HEP. Eval: HEP provided  2. Patient will demonstrate standing lumbar flexion AROM </= 25% limited without pain to improve ease with functional lifting. Eval: Standing Lumbar Flexion AROM = 25% limited (left lumbar pain 2/10)  3. Patient will demonstrate left/right hip abduction MMT 5/5 to improve ease with recreational ADLs. Eval: Left/Right Hip Abduction MMT = 4+/5           Long Term Goals: To be accomplished in 4 weeks:  1. Patient will demonstrate a significant functional improvement as demonstrated by a score of >/= 65 on FOTO. Eval: FOTO = 57       2. Patient will subjectively report symptoms at worst in last week </= 3/10 to improve overall quality of life. Eval: Pain at Worst (last week) = 7/10  3. Patient will demonstrate ability to perform squat with box lift (box + 10 lb) x10 repetitions without pain to improve ease with heavy labor.   Eval: NT    Frequency / Duration: Patient to be seen 2 times per week for 4 weeks. Patient/ Caregiver education and instruction: Diagnosis, prognosis, self care, activity modification, and exercises   [x]  Plan of care has been reviewed with PTA      Certification Period: 1/26/2023 - 2/24/2023  Tanisha Coyne, PT 1/26/2023 7:45 AM  ________________________________________________________________________    I certify that the above Therapy Services are being furnished while the patient is under my care. I agree with the treatment plan and certify that this therapy is necessary.     [de-identified] Signature:____________Date:_________TIME:________     Tali Mendez MD  ** Signature, Date and Time must be completed for valid certification **  Please sign and return to [facility] - [address], [address]   [phone] fax

## 2023-01-31 ENCOUNTER — HOSPITAL ENCOUNTER (OUTPATIENT)
Dept: PHYSICAL THERAPY | Age: 68
Discharge: HOME | End: 2023-01-31
Attending: PHYSICAL MEDICINE & REHABILITATION
Payer: MEDICARE

## 2023-01-31 PROCEDURE — 97110 THERAPEUTIC EXERCISES: CPT

## 2023-01-31 PROCEDURE — 97112 NEUROMUSCULAR REEDUCATION: CPT

## 2023-01-31 PROCEDURE — 97140 MANUAL THERAPY 1/> REGIONS: CPT

## 2023-01-31 NOTE — PROGRESS NOTES
PT DAILY TREATMENT NOTE     Patient Name: Hans Lopez  Date:2023  : 1955  [x]  Patient  Verified  Payor: Ladonna Riedel / Plan: [facility] MEDICARE COMPLETE / Product Type: Managed Care Medicare /    In time:119  Out time:218  Total Treatment Time (min): 59  Visit #: 2 of 8    Medicare/BCBS Only   Total Timed Codes (min):  49 1:1 Treatment Time:  49       Treatment Area: Other low back pain [M54.59]    SUBJECTIVE  Pain Level (0-10 scale): 0  Any medication changes, allergies to medications, adverse drug reactions, diagnosis change, or new procedure performed?: [x] No    [] Yes (see summary sheet for update)  Subjective functional status/changes:   [] No changes reported  Patient reports completion of prescribed HEP as instructed with good benefit. OBJECTIVE    Modality rationale: decrease pain and increase tissue extensibility to improve the patients ability to improve ease with sleep    Min Type Additional Details    10 []  Ice     [x]  heat  []  Ice massage Position: Supine  Location: Lumbar, Post-Tx      25 min Therapeutic Exercise:  [x] See flow sheet : Emphasis placed on improving available lumbopelvic and LE AROM and strength   Rationale: increase ROM and increase strength to improve the patients ability to improve ease with functional ADLs    16 min Neuromuscular Re-education:  [x]  See flow sheet : Emphasis placed on improving activation and recruitment of the gluteal and anterior abdominal musculature and improving lumbopelvic kinesthetic awareness   Rationale: increase ROM, increase strength, and increase proprioception  to improve the patients ability to improve ease with functional lifting. 8 min Manual Therapy:    Supine, Left Hip AP Grade III-IV Mobilization (45-90 deg abd)   The manual therapy interventions were performed at a separate and distinct time from the therapeutic activities interventions.   Rationale: decrease pain, increase ROM, and increase tissue extensibility to improve ease with derrick mobility. With   [] TE   [] TA   [] neuro   [] other: Patient Education: [x] Review HEP    [] Progressed/Changed HEP based on:   [] positioning   [] body mechanics   [] transfers   [] heat/ice application    [] other:      Other Objective/Functional Measures: Hypomobile left hip posterior capsular mobility     Pain Level (0-10 scale) post treatment: 0    ASSESSMENT/Changes in Function: With continued prominent hypomobility of the left hip with PROM loss most significant in the direction of flexion and internal rotation. Patient will continue to benefit from skilled PT services to modify and progress therapeutic interventions, address functional mobility deficits, address ROM deficits, address strength deficits, analyze and address soft tissue restrictions, analyze and cue movement patterns, analyze and modify body mechanics/ergonomics, assess and modify postural abnormalities, address imbalance/dizziness, and instruct in home and community integration to attain remaining goals. []  See Plan of Care  []  See progress note/recertification  []  See Discharge Summary         Progress towards goals / Updated goals:    Short Term Goals: To be accomplished in 2 weeks:  1. Patient will subjectively report full compliance with prescribed HEP. Eval: HEP provided  Current: Met, HEP performance reported as prescribed, 1/31/2023  2. Patient will demonstrate standing lumbar flexion AROM </= 25% limited without pain to improve ease with functional lifting. Eval: Standing Lumbar Flexion AROM = 25% limited (left lumbar pain 2/10)  3. Patient will demonstrate left/right hip abduction MMT 5/5 to improve ease with recreational ADLs. Eval: Left/Right Hip Abduction MMT = 4+/5           Long Term Goals: To be accomplished in 4 weeks:  1. Patient will demonstrate a significant functional improvement as demonstrated by a score of >/= 65 on FOTO. Eval: FOTO = 57       2.  Patient will subjectively report symptoms at worst in last week </= 3/10 to improve overall quality of life. Eval: Pain at Worst (last week) = 7/10  3. Patient will demonstrate ability to perform squat with box lift (box + 10 lb) x10 repetitions without pain to improve ease with heavy labor.   Eval: NT    PLAN  [x]  Upgrade activities as tolerated     [x]  Continue plan of care  []  Update interventions per flow sheet       []  Discharge due to:_  []  Other:_      Ciera Hodgson, PT 1/31/2023  7:10 AM    Future Appointments   Date Time Provider Archie Richardson   1/31/2023  1:30 PM Raoul, [address] [facility]   2/2/2023  2:30 PM Ether George, PTA [facility]   2/7/2023 12:30 PM Adan Mayes, PT [facility]   2/9/2023 11:00 AM Ether George, PTA [facility]   2/14/2023  8:30 AM Ether George, PTA [facility]   2/16/2023 11:30 AM Ether George, PTA [facility]   2/21/2023  8:00 AM Ether George, PTA [facility]   2/23/2023  9:00 AM Ether George, PTA [facility]

## 2023-02-02 ENCOUNTER — HOSPITAL ENCOUNTER (OUTPATIENT)
Dept: PHYSICAL THERAPY | Age: 68
Discharge: HOME | End: 2023-02-02
Attending: PHYSICAL MEDICINE & REHABILITATION
Payer: MEDICARE

## 2023-02-02 PROCEDURE — 97112 NEUROMUSCULAR REEDUCATION: CPT

## 2023-02-02 PROCEDURE — 97140 MANUAL THERAPY 1/> REGIONS: CPT

## 2023-02-02 PROCEDURE — 97110 THERAPEUTIC EXERCISES: CPT

## 2023-02-02 NOTE — PROGRESS NOTES
PT DAILY TREATMENT NOTE     Patient Name: Yamilet Stratton  Date:2023  : 1955  [x]  Patient  Verified  Payor: Ynaet Buchanan / Plan: [facility] MEDICARE COMPLETE / Product Type: Managed Care Medicare /    In time:2:20  Out time:3:22  Total Treatment Time (min): 62  Visit #: 3 of 8    Medicare/BCBS Only   Total Timed Codes (min):  52 1:1 Treatment Time:  38       Treatment Area: Other low back pain [M54.59]    SUBJECTIVE  Pain Level (0-10 scale): 0  Any medication changes, allergies to medications, adverse drug reactions, diagnosis change, or new procedure performed?: [x] No    [] Yes (see summary sheet for update)  Subjective functional status/changes:   [] No changes reported  Patient states he has a lot of stiffness and pain in the mornings. OBJECTIVE              Modality rationale: decrease pain and increase tissue extensibility to improve the patients ability to improve ease with sleep     Min Type Additional Details    10 []  Ice     [x]  heat  []  Ice massage Position: Supine  Location: Lumbar, Post-Tx      28 min Therapeutic Exercise:  [x] See flow sheet : Emphasis placed on improving available lumbopelvic and LE AROM and strength   Rationale: increase ROM and increase strength to improve the patients ability to improve ease with functional ADLs     16 min Neuromuscular Re-education:  [x]  See flow sheet : Emphasis placed on improving activation and recruitment of the gluteal and anterior abdominal musculature and improving lumbopelvic kinesthetic awareness   Rationale: increase ROM, increase strength, and increase proprioception  to improve the patients ability to improve ease with functional lifting. 8 min Manual Therapy:    Supine, Left Hip AP Grade I-II  Supine, long axial distraction   The manual therapy interventions were performed at a separate and distinct time from the therapeutic activities interventions.   Rationale: decrease pain, increase ROM, and increase tissue extensibility to improve ease with derrick mobility. With   [] TE   [] TA   [] neuro   [] other: Patient Education: [x] Review HEP    [] Progressed/Changed HEP based on:   [] positioning   [] body mechanics   [] transfers   [] heat/ice application    [] other:      Other Objective/Functional Measures: patient able to perform sit to stand without UE assist.      Pain Level (0-10 scale) post treatment: 1    ASSESSMENT/Changes in Function: Reviewed sleeping position and suggested patient to sleep with a pillow between knee to help manage morning stiffness. Patient will continue to benefit from skilled PT services to modify and progress therapeutic interventions, address functional mobility deficits, address ROM deficits, address strength deficits, analyze and address soft tissue restrictions, analyze and cue movement patterns, analyze and modify body mechanics/ergonomics, assess and modify postural abnormalities, address imbalance/dizziness, and instruct in home and community integration to attain remaining goals. []  See Plan of Care  []  See progress note/recertification  []  See Discharge Summary         Progress towards goals / Updated goals:  Short Term Goals: To be accomplished in 2 weeks:  1. Patient will subjectively report full compliance with prescribed HEP. Eval: HEP provided  Current: Met, HEP performance reported as prescribed, 1/31/2023  2. Patient will demonstrate standing lumbar flexion AROM </= 25% limited without pain to improve ease with functional lifting. Eval: Standing Lumbar Flexion AROM = 25% limited (left lumbar pain 2/10)  3. Patient will demonstrate left/right hip abduction MMT 5/5 to improve ease with recreational ADLs. Eval: Left/Right Hip Abduction MMT = 4+/5           Long Term Goals: To be accomplished in 4 weeks:  1. Patient will demonstrate a significant functional improvement as demonstrated by a score of >/= 65 on FOTO. Eval: FOTO = 57       2. Patient will subjectively report symptoms at worst in last week </= 3/10 to improve overall quality of life. Eval: Pain at Worst (last week) = 7/10  3. Patient will demonstrate ability to perform squat with box lift (box + 10 lb) x10 repetitions without pain to improve ease with heavy labor.   Eval: NT    PLAN  []  Upgrade activities as tolerated     [x]  Continue plan of care  []  Update interventions per flow sheet       []  Discharge due to:_  []  Other:_      Juice Galeas PTA 2/2/2023  2:14 PM    Future Appointments   Date Time Provider Archie Richardson   2/2/2023  2:30 PM Quyen Uribe, PTA [facility]   2/7/2023 12:30 PM Emerita Vega, PT [facility]   2/9/2023 11:00 AM Quyendayanara Uribe, PTA [facility]   2/14/2023  8:30 AM Quyendayanara Drewith, PTA [facility]   2/16/2023 11:30 AM Quyen Newburgh, PTA [facility]   2/21/2023  8:00 AM Quyen Newburgh, PTA [facility]   2/23/2023  9:00 AM Quyen Newburgh, PTA [facility]

## 2023-02-07 ENCOUNTER — HOSPITAL ENCOUNTER (OUTPATIENT)
Dept: PHYSICAL THERAPY | Age: 68
Discharge: HOME | End: 2023-02-07
Attending: PHYSICAL MEDICINE & REHABILITATION
Payer: MEDICARE

## 2023-02-07 PROCEDURE — 97112 NEUROMUSCULAR REEDUCATION: CPT | Performed by: PHYSICAL THERAPIST

## 2023-02-07 PROCEDURE — 97110 THERAPEUTIC EXERCISES: CPT | Performed by: PHYSICAL THERAPIST

## 2023-02-07 PROCEDURE — 97140 MANUAL THERAPY 1/> REGIONS: CPT | Performed by: PHYSICAL THERAPIST

## 2023-02-07 NOTE — PROGRESS NOTES
PT DAILY TREATMENT NOTE     Patient Name: Rosaura Gomes  Date:2023  : 1955  [x]  Patient  Verified  Payor: Mary Hinds / Plan: [facility] MEDICARE COMPLETE / Product Type: Managed Care Medicare /    In time:12:30  Out time:1:38  Total Treatment Time (min): 68  Visit #: 4 of 8    Medicare/BCBS Only   Total Timed Codes (min):  58 1:1 Treatment Time:  55       Treatment Area: Other low back pain [M54.59]    SUBJECTIVE  Pain Level (0-10 scale): 2-3  Any medication changes, allergies to medications, adverse drug reactions, diagnosis change, or new procedure performed?: [x] No    [] Yes (see summary sheet for update)  Subjective functional status/changes:   [] No changes reported  Patient notes he woke up with some back pain. Notes it is located in the left lower back. OBJECTIVE               Modality rationale: decrease pain and increase tissue extensibility to improve the patients ability to improve ease with sleep     Min Type Additional Details    10 [x]  Ice     []  heat  []  Ice massage Position: sitting  Location: Lumbar, Post-Tx      [] Skin assessment post-treatment:  []intact []redness- no adverse reaction    []redness - adverse reaction:     30 min Therapeutic Exercise:  [x] See flow sheet : Emphasis placed on improving available lumbopelvic and LE AROM and strength   Rationale: increase ROM and increase strength to improve the patients ability to improve ease with functional ADLs     20 min Neuromuscular Re-education:  [x]  See flow sheet : Emphasis placed on improving activation and recruitment of the gluteal and anterior abdominal musculature and improving lumbopelvic kinesthetic awareness   Rationale: increase ROM, increase strength, and increase proprioception  to improve the patients ability to improve ease with functional lifting.       8 min Manual Therapy:    Supine, Left Hip AP Grade I-II  Supine, long axial distraction   The manual therapy interventions were performed at a separate and distinct time from the therapeutic activities interventions. Rationale: decrease pain, increase ROM, and increase tissue extensibility to improve ease with derrick mobility. With   [] TE   [] TA   [] neuro   [] other: Patient Education: [x] Review HEP    [] Progressed/Changed HEP based on:   [] positioning   [] body mechanics   [] transfers   [] heat/ice application    [] other:      Other Objective/Functional Measures: Standing Lumbar flexion AROM = WNL without pain. Pain Level (0-10 scale) post treatment: 0    ASSESSMENT/Changes in Function: Patient has decreased subjective c/o pain and improved lumbar AROM with less discomfort. Patient will continue to benefit from skilled PT services to modify and progress therapeutic interventions, address functional mobility deficits, address ROM deficits, address strength deficits, analyze and address soft tissue restrictions, analyze and cue movement patterns, analyze and modify body mechanics/ergonomics, assess and modify postural abnormalities, address imbalance/dizziness, and instruct in home and community integration to attain remaining goals. [x]  See Plan of Care  []  See progress note/recertification  []  See Discharge Summary         Progress towards goals / Updated goals:  Short Term Goals: To be accomplished in 2 weeks:  1. Patient will subjectively report full compliance with prescribed HEP. Eval: HEP provided  Current: Met, HEP performance reported as prescribed, 1/31/2023  2. Patient will demonstrate standing lumbar flexion AROM </= 25% limited without pain to improve ease with functional lifting. Eval: Standing Lumbar Flexion AROM = 25% limited (left lumbar pain 2/10)  Current:  Standing Lumbar flexion AROM = WNL without pain. 2/7/2023. Goal Met.  3. Patient will demonstrate left/right hip abduction MMT 5/5 to improve ease with recreational ADLs.   Eval: Left/Right Hip Abduction MMT = 4+/5           Long Term Goals: To be accomplished in 4 weeks:  1. Patient will demonstrate a significant functional improvement as demonstrated by a score of >/= 65 on FOTO. Eval: FOTO = 57       2. Patient will subjectively report symptoms at worst in last week </= 3/10 to improve overall quality of life. Eval: Pain at Worst (last week) = 7/10  Current:  Pain at worst (last week) = 3/10.  2/7/2023. Goal met. 3. Patient will demonstrate ability to perform squat with box lift (box + 10 lb) x10 repetitions without pain to improve ease with heavy labor.   Eval: NT    PLAN  [x]  Upgrade activities as tolerated     [x]  Continue plan of care  []  Update interventions per flow sheet       []  Discharge due to:_  []  Other:_      Ana Bolanos, PT 2/7/2023  12:31 PM    Future Appointments   Date Time Provider Archie Richardson   2/9/2023 11:00 AM Shasta Santillan PTA [facility]   2/14/2023  8:30 AM Shasta Santillan PTA [facility]   2/16/2023 11:30 AM Shasta Santillan PTA [facility]   2/21/2023  8:00 AM Shasta Santillan PTA [facility]   2/23/2023  9:00 AM Shasta Santillan PTA [facility]

## 2023-02-09 ENCOUNTER — HOSPITAL ENCOUNTER (OUTPATIENT)
Dept: PHYSICAL THERAPY | Age: 68
Discharge: HOME | End: 2023-02-09
Attending: PHYSICAL MEDICINE & REHABILITATION
Payer: MEDICARE

## 2023-02-09 PROCEDURE — 97140 MANUAL THERAPY 1/> REGIONS: CPT

## 2023-02-09 PROCEDURE — 97110 THERAPEUTIC EXERCISES: CPT

## 2023-02-09 NOTE — PROGRESS NOTES
PT DAILY TREATMENT NOTE     Patient Name: Krissy Blake  Date:2023  : 1955  [x]  Patient  Verified  Payor: Marta Howe / Plan: [facility] MEDIC[facility] COMPLETE / Product Type: Managed Care Medicare /    In time:10:57  Out time:11:53  Total Treatment Time (min): 56  Visit #: 5 of 8    Medicare/BCBS Only   Total Timed Codes (min):  46 1:1 Treatment Time:  30       Treatment Area: Other low back pain [M54.59]    SUBJECTIVE  Pain Level (0-10 scale): 3  Any medication changes, allergies to medications, adverse drug reactions, diagnosis change, or new procedure performed?: [x] No    [] Yes (see summary sheet for update)  Subjective functional status/changes:   [] No changes reported  Patient reports he was helping work on a house yesterday and did a lot of lifting and pulling of plywood that aggravated his back. OBJECTIVE                 Modality rationale: decrease pain and increase tissue extensibility to improve the patients ability to improve ease with sleep     Min Type Additional Details    10 []  Ice     [x]  heat  []  Ice massage Position: Supine  Location: Lumbar, Post-Tx      28 min Therapeutic Exercise:  [x] See flow sheet : Emphasis placed on improving available lumbopelvic and LE AROM and strength   Rationale: increase ROM and increase strength to improve the patients ability to improve ease with functional ADLs     16 min Neuromuscular Re-education:  [x]  See flow sheet : Emphasis placed on improving activation and recruitment of the gluteal and anterior abdominal musculature and improving lumbopelvic kinesthetic awareness   Rationale: increase ROM, increase strength, and increase proprioception  to improve the patients ability to improve ease with functional lifting.       8 min Manual Therapy:    Supine, Left Hip AP Grade I-II  Supine, long axial distraction   The manual therapy interventions were performed at a separate and distinct time from the therapeutic activities interventions. Rationale: decrease pain, increase ROM, and increase tissue extensibility to improve ease with derrick mobility. With   [] TE   [] TA   [] neuro   [] other: Patient Education: [x] Review HEP    [] Progressed/Changed HEP based on:   [] positioning   [] body mechanics   [] transfers   [] heat/ice application    [] other:      Other Objective/Functional Measures: able to perform sit to stand without UE assist.      Pain Level (0-10 scale) post treatment: 0    ASSESSMENT/Changes in Function: Patient continues to be limited with hip abduction strengthen requiring verbal and tactile cues for proper form for sidelying hip abduction. Patient will continue to benefit from skilled PT services to modify and progress therapeutic interventions, address functional mobility deficits, address ROM deficits, address strength deficits, analyze and address soft tissue restrictions, analyze and cue movement patterns, analyze and modify body mechanics/ergonomics, assess and modify postural abnormalities, address imbalance/dizziness, and instruct in home and community integration to attain remaining goals. []  See Plan of Care  []  See progress note/recertification  []  See Discharge Summary         Progress towards goals / Updated goals:  Short Term Goals: To be accomplished in 2 weeks:  1. Patient will subjectively report full compliance with prescribed HEP. Eval: HEP provided  Current: Met, HEP performance reported as prescribed, 1/31/2023  2. Patient will demonstrate standing lumbar flexion AROM </= 25% limited without pain to improve ease with functional lifting. Eval: Standing Lumbar Flexion AROM = 25% limited (left lumbar pain 2/10)  Current:  Goal Met. Standing Lumbar flexion AROM = WNL without pain. 2/7/2023. 3. Patient will demonstrate left/right hip abduction MMT 5/5 to improve ease with recreational ADLs.   Eval: Left/Right Hip Abduction MMT = 4+/5           Long Term Goals: To be accomplished in 4 weeks:  1. Patient will demonstrate a significant functional improvement as demonstrated by a score of >/= 65 on FOTO. Eval: FOTO = 57       2. Patient will subjectively report symptoms at worst in last week </= 3/10 to improve overall quality of life. Eval: Pain at Worst (last week) = 7/10  Current: Goal met. Pain at worst (last week) = 3/10.  2/7/2023. 3. Patient will demonstrate ability to perform squat with box lift (box + 10 lb) x10 repetitions without pain to improve ease with heavy labor.   Eval: NT    PLAN  []  Upgrade activities as tolerated     [x]  Continue plan of care  []  Update interventions per flow sheet       []  Discharge due to:_  []  Other:_      Luis Armando Hinkle PTA 2/9/2023  11:16 AM    Future Appointments   Date Time Provider Archie Richardson   2/14/2023  8:30 AM Jose Hassan PTA [facility] [address]   2/16/2023 11:30 AM Jose Hassan PTA [facility] [address]   2/21/2023  8:00 AM Jose Hassan PTA [facility] [address]   2/23/2023  9:00 AM Jose Hassan PTA [facility] [address]

## 2023-02-14 ENCOUNTER — APPOINTMENT (OUTPATIENT)
Dept: PHYSICAL THERAPY | Age: 68
End: 2023-02-14
Attending: PHYSICAL MEDICINE & REHABILITATION
Payer: MEDICARE

## 2023-02-14 ENCOUNTER — HOSPITAL ENCOUNTER (OUTPATIENT)
Facility: HOSPITAL | Age: 68
Setting detail: RECURRING SERIES
Discharge: HOME | End: 2023-02-17
Payer: MEDICARE

## 2023-02-14 PROCEDURE — 97140 MANUAL THERAPY 1/> REGIONS: CPT

## 2023-02-14 PROCEDURE — 97110 THERAPEUTIC EXERCISES: CPT

## 2023-02-14 NOTE — PROGRESS NOTES
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 Changes to:  Meds, Allergies, Med Hx, Sx Hx? If yes, update Summary List no       TREATMENT AREA =  Other low back pain [M54.59]    OBJECTIVE    Modalities Rationale:     decrease pain to improve patient's ability to progress to PLOF and address remaining functional goals. 10 min  unbilled []  Ice     [x]  Heat    location/position: Sitting; lower back    Skin assessment post-treatment (if applicable):    []  intact    []  redness- no adverse reaction                 []redness - adverse reaction:         Therapeutic Procedures: Tx Min Billable or 1:1 Min (if diff from Tx Min) Procedure, Rationale, Specifics   8 [id number] Manual Therapy (timed):  decrease pain, increase ROM, increase tissue extensibility, decrease edema, decrease trigger points, and increase postural awareness to improve patient's ability to progress to PLOF and address remaining functional goals. The manual therapy interventions were performed at a separate and distinct time from the therapeutic activities interventions . (see flow sheet as applicable)     Details if applicable:    Supine, Left Hip AP Grade I-II  Supine, long axial distraction  Supine, left hip stretching in all planes.         15 10 [id number] Neuromuscular Re-Education (timed):  improve balance, coordination, kinesthetic sense, posture, core stability and proprioception to improve patient's ability to develop conscious control of individual muscles and awareness of position of extremities in order to progress to PLOF and address remaining functional goals. (see flow sheet as applicable)     Details if applicable:     25 12 [id number] Therapeutic Exercise (timed):  increase ROM, strength, coordination, balance, and proprioception to improve patient's ability to progress to PLOF and address remaining functional goals. (see flow sheet as applicable)     Details if applicable:     50 30 MC BC Totals Reminder: bill using total billable min of TIMED therapeutic procedures (example: do not include dry needle or estim unattended, both untimed codes, in totals to left)  8-22 min = 1 unit; 23-37 min = 2 units; 38-52 min = 3 units; 53-67 min = 4 units; 68-82 min = 5 units   Total Total     [x]  Patient Education billed concurrently with other procedures   [x] Review HEP    [] Progressed/Changed HEP, detail:    [] Other detail:       Objective Information/Functional Measures/Assessment    able to perform sit to stand without use of UE without increase in pain. Reviewed proper body mechanics with heavy lifting to avoid increase in back pain. Patient will continue to benefit from skilled PT / OT services to modify and progress therapeutic interventions, analyze and address functional mobility deficits, analyze and address ROM deficits, analyze and address strength deficits, analyze and address soft tissue restrictions, analyze and cue for proper movement patterns, analyze and modify for postural abnormalities, analyze and address imbalance/dizziness, and instruct in home and community integration to address functional deficits and attain remaining goals. Progress toward goals / Updated goals:  []  See Progress Note/Recertification    Short Term Goals: To be accomplished in 2 weeks:  1. Patient will subjectively report full compliance with prescribed HEP.   Eval: HEP provided  Current: Met, HEP performance reported as prescribed, 1/31/2023  2. Patient will demonstrate standing lumbar flexion AROM </= 25% limited without pain to improve ease with functional lifting. Eval: Standing Lumbar Flexion AROM = 25% limited (left lumbar pain 2/10)  Current:  Goal Met. Standing Lumbar flexion AROM = WNL without pain. 2/7/2023. 3. Patient will demonstrate left/right hip abduction MMT 5/5 to improve ease with recreational ADLs. Eval: Left/Right Hip Abduction MMT = 4+/5           Long Term Goals: To be accomplished in 4 weeks:  1. Patient will demonstrate a significant functional improvement as demonstrated by a score of >/= 65 on FOTO. Eval: FOTO = 57       2. Patient will subjectively report symptoms at worst in last week </= 3/10 to improve overall quality of life. Eval: Pain at Worst (last week) = 7/10  Current: Goal met. Pain at worst (last week) = 3/10.  2/7/2023. 3. Patient will demonstrate ability to perform squat with box lift (box + 10 lb) x10 repetitions without pain to improve ease with heavy labor. Eval: NT  Current: Progressing: able to perform sit to stand without use of UE without increase in pain.  2/14/23     PLAN  Yes  Continue plan of care  []  Upgrade activities as tolerated  []  Discharge due to :  []  Other:    Shima Btaista, PTA    2/14/2023    7:26 AM    Future Appointments   Date Time Provider Kasey Vaughani   2/14/2023  8:30 AM Sita Muff, PTA [facility]   2/16/2023 11:30 AM Sita Muff, PTA [facility]   2/21/2023  8:00 AM Sita Muff, PTA [facility]   2/23/2023  9:00 AM Sita Muff, PTA [facility]   3/7/2023 11:45 AM MD TAHIRA Crouch III BS [facility]   4/6/2023 12:15 PM MD CORINNE Gardner BS [facility]   5/2/2023  9:30 AM Rogelio Muñoz MD [facility]   12/4/2023 11:15 AM Hiram Fernandez MD [facility] BS [facility]

## 2023-02-15 ENCOUNTER — TELEPHONE (OUTPATIENT)
Facility: HOSPITAL | Age: 68
End: 2023-02-15

## 2023-02-16 ENCOUNTER — APPOINTMENT (OUTPATIENT)
Facility: HOSPITAL | Age: 68
End: 2023-02-16
Payer: MEDICARE

## 2023-02-16 ENCOUNTER — APPOINTMENT (OUTPATIENT)
Dept: PHYSICAL THERAPY | Age: 68
End: 2023-02-16
Attending: PHYSICAL MEDICINE & REHABILITATION
Payer: MEDICARE

## 2023-02-21 ENCOUNTER — APPOINTMENT (OUTPATIENT)
Dept: PHYSICAL THERAPY | Age: 68
End: 2023-02-21
Attending: PHYSICAL MEDICINE & REHABILITATION
Payer: MEDICARE

## 2023-02-21 ENCOUNTER — HOSPITAL ENCOUNTER (OUTPATIENT)
Facility: HOSPITAL | Age: 68
Setting detail: RECURRING SERIES
Discharge: HOME | End: 2023-02-24
Payer: MEDICARE

## 2023-02-21 PROCEDURE — 97110 THERAPEUTIC EXERCISES: CPT

## 2023-02-21 PROCEDURE — 97140 MANUAL THERAPY 1/> REGIONS: CPT

## 2023-02-21 NOTE — PROGRESS NOTES
PHYSICAL / OCCUPATIONAL THERAPY - DAILY TREATMENT NOTE (updated )    Patient Name: Luis Patel    Date: 2023    : 1955  Insurance: Payor: [facility] MEDICARE / Plan: Kaleb English / Product Type: *No Product type* /      Patient  verified Yes     Visit #   Current / Total 7 8   Time   In / Out 7:56 9:00   Pain   In / Out 2 0   Subjective Functional Status/Changes: Patient continues to be limited mostly with bending activities. Changes to:  Meds, Allergies, Med Hx, Sx Hx? If yes, update Summary List no       TREATMENT AREA =  Other low back pain [M54.59]    OBJECTIVE     Modalities Rationale:     decrease pain to improve patient's ability to progress to PLOF and address remaining functional goals. 10 min  unbilled []  Ice     [x]  Heat    location/position: Sitting; lower back    Skin assessment post-treatment (if applicable):    []  intact    []  redness- no adverse reaction                 []redness - adverse reaction:          Therapeutic Procedures: Tx Min Billable or 1:1 Min (if diff from Tx Min) Procedure, Rationale, Specifics   8 2 57719 Manual Therapy (timed):  decrease pain, increase ROM, increase tissue extensibility, decrease edema, decrease trigger points, and increase postural awareness to improve patient's ability to progress to PLOF and address remaining functional goals. The manual therapy interventions were performed at a separate and distinct time from the therapeutic activities interventions . (see flow sheet as applicable)      Details if applicable:    Supine, Left Hip AP Grade I-II  Supine, long axial distraction  Supine, left hip stretching in all planes.           15 10 9773 [place] Neuromuscular Re-Education (timed):  improve balance, coordination, kinesthetic sense, posture, core stability and proprioception to improve patient's ability to develop conscious control of individual muscles and awareness of position of extremities in order to progress to PLOF and address remaining functional goals. (see flow sheet as applicable)      Details if applicable:     31 12 23622 Therapeutic Exercise (timed):  increase ROM, strength, coordination, balance, and proprioception to improve patient's ability to progress to PLOF and address remaining functional goals. (see flow sheet as applicable)      Details if applicable:     47 30 [facility] Totals Reminder: bill using total billable min of TIMED therapeutic procedures (example: do not include dry needle or estim unattended, both untimed codes, in totals to left)  8-22 min = 1 unit; 23-37 min = 2 units; 38-52 min = 3 units; 53-67 min = 4 units; 68-82 min = 5 units   Total Total      [x]  Patient Education billed concurrently with other procedures   [x] Review HEP    [] Progressed/Changed HEP, detail:    [] Other detail:       Objective Information/Functional Measures/Assessment    Patient continues to be limited with recreational activities that require bending and lifting. Patient will continue to benefit from skilled PT / OT services to modify and progress therapeutic interventions, analyze and address functional mobility deficits, analyze and address ROM deficits, analyze and address strength deficits, analyze and address soft tissue restrictions, analyze and cue for proper movement patterns, analyze and modify for postural abnormalities, analyze and address imbalance/dizziness, and instruct in home and community integration to address functional deficits and attain remaining goals. Progress toward goals / Updated goals:  []  See Progress Note/Recertification    Short Term Goals: To be accomplished in 2 weeks:  1. Patient will subjectively report full compliance with prescribed HEP. Eval: HEP provided  Current: Met, HEP performance reported as prescribed, 1/31/2023  2. Patient will demonstrate standing lumbar flexion AROM </= 25% limited without pain to improve ease with functional lifting.   Eval: Standing Lumbar Flexion AROM = 25% limited (left lumbar pain 2/10)  Current:  Goal Met. Standing Lumbar flexion AROM = WNL without pain. 2/7/2023. 3. Patient will demonstrate left/right hip abduction MMT 5/5 to improve ease with recreational ADLs. Eval: Left/Right Hip Abduction MMT = 4+/5  Current: remains:  Left/Right Hip Abduction MMT = 4+/5 2/21/23           Long Term Goals: To be accomplished in 4 weeks:  1. Patient will demonstrate a significant functional improvement as demonstrated by a score of >/= 65 on FOTO. Eval: FOTO = 57       2. Patient will subjectively report symptoms at worst in last week </= 3/10 to improve overall quality of life. Eval: Pain at Worst (last week) = 7/10  Current: Goal met. Pain at worst (last week) = 3/10.  2/7/2023. 3. Patient will demonstrate ability to perform squat with box lift (box + 10 lb) x10 repetitions without pain to improve ease with heavy labor. Eval: NT  Current: Progressing: able to perform sit to stand without use of UE without increase in pain.  2/14/23        PLAN  Yes  Continue plan of care  []  Upgrade activities as tolerated  []  Discharge due to :  []  Other:    Shima Batista, PTA    2/21/2023    7:20 AM    Future Appointments   Date Time Provider Kasey Lucero   2/21/2023  8:00 AM Gisella aMscorro PTA [facility]   2/23/2023  9:00 AM Gisella Mascorro PTA [facility]   3/7/2023 11:45 AM MD TAHIRA Lombardi III BS AMB   4/6/2023 12:15 PM Hesed MD CORINNE Camejo BS AMB   5/2/2023  9:30 AM Marj Melendez MD [facility] BS AMB   12/4/2023 11:15 AM Hiram Loredo MD [facility] BS AMB

## 2023-02-23 ENCOUNTER — APPOINTMENT (OUTPATIENT)
Dept: PHYSICAL THERAPY | Age: 68
End: 2023-02-23
Attending: PHYSICAL MEDICINE & REHABILITATION
Payer: MEDICARE

## 2023-02-23 ENCOUNTER — HOSPITAL ENCOUNTER (OUTPATIENT)
Facility: HOSPITAL | Age: 68
Setting detail: RECURRING SERIES
Discharge: HOME | End: 2023-02-26
Payer: MEDICARE

## 2023-02-23 PROCEDURE — 97110 THERAPEUTIC EXERCISES: CPT

## 2023-02-23 PROCEDURE — 97112 NEUROMUSCULAR REEDUCATION: CPT

## 2023-02-23 NOTE — PROGRESS NOTES
Physical Therapy Discharge Instructions      [facility]  [address]  [place], [address] [facility] Ph: [phone] Fx: [phone]    Patient: Susan Gallego  : 1955      Continue Home Exercise Program 1 time per day       Continue with    [x] Ice  as needed      [x] Heat           Follow up with MD:     [] Upon completion of therapy     [x] As needed      Recommendations:     [x]   Return to activity with home program    []   Return to activity with the following modifications:       []Post Rehab Program    []Join Independent aquatic program     []Return to/join local gym    KLAUS CRONIN, PTA 2023 9:22 AM

## 2023-02-23 NOTE — PROGRESS NOTES
PHYSICAL / OCCUPATIONAL THERAPY - DAILY TREATMENT NOTE (updated )    Patient Name: Damien Ang    Date: 2023    : 1955  Insurance: Payor: [facility] MEDICARE / Plan: Kim Dials / Product Type: *No Product type* /      Patient  verified Yes     Visit #   Current / Total 8 8   Time   In / Out 8:53 9:58   Pain   In / Out 2 65   Subjective Functional Status/Changes: Patient states he is more aware with bending and lifting to help with avoiding pain. Changes to:  Meds, Allergies, Med Hx, Sx Hx? If yes, update Summary List no       TREATMENT AREA =  Other low back pain [M54.59]    OBJECTIVE     Modalities Rationale:     decrease pain to improve patient's ability to progress to PLOF and address remaining functional goals. 10 min  unbilled []  Ice     [x]  Heat    location/position: Sitting; lower back    Skin assessment post-treatment (if applicable):    []  intact    []  redness- no adverse reaction                 []redness - adverse reaction:          Therapeutic Procedures: Tx Min Billable or 1:1 Min (if diff from Tx Min) Procedure, Rationale, Specifics   15 15 [id number] Neuromuscular Re-Education (timed):  improve balance, coordination, kinesthetic sense, posture, core stability and proprioception to improve patient's ability to develop conscious control of individual muscles and awareness of position of extremities in order to progress to PLOF and address remaining functional goals. (see flow sheet as applicable)      Details if applicable:     40 15 70173 Therapeutic Exercise (timed):  increase ROM, strength, coordination, balance, and proprioception to improve patient's ability to progress to PLOF and address remaining functional goals.  (see flow sheet as applicable)      Details if applicable:     54 30 [facility] Totals Reminder: bill using total billable min of TIMED therapeutic procedures (example: do not include dry needle or estim unattended, both untimed codes, in totals to left)  8-22 min = 1 unit; 23-37 min = 2 units; 38-52 min = 3 units; 53-67 min = 4 units; 68-82 min = 5 units   Total Total            [x]  Patient Education billed concurrently with other procedures   [x] Review HEP    [] Progressed/Changed HEP, detail:    [] Other detail:       Objective Information/Functional Measures/Assessment    Patient reports 80% in overall improvement of symptoms. Patient has progressed well with lumbar mobility, tolerance to activities, and lifting mechanics. Provided patient with updated HEP to help maintain mobility and increase strength. Progress toward goals / Updated goals:  [x]  See Progress Note/Recertification    Short Term Goals: To be accomplished in 2 weeks:  1. Patient will subjectively report full compliance with prescribed HEP. Eval: HEP provided  Current: Met, HEP performance reported as prescribed, 1/31/2023  2. Patient will demonstrate standing lumbar flexion AROM </= 25% limited without pain to improve ease with functional lifting. Eval: Standing Lumbar Flexion AROM = 25% limited (left lumbar pain 2/10)  Current:  Goal Met. Standing Lumbar flexion AROM = WNL without pain. 2/7/2023. 3. Patient will demonstrate left/right hip abduction MMT 5/5 to improve ease with recreational ADLs. Eval: Left/Right Hip Abduction MMT = 4+/5  Current: remains:  Left/Right Hip Abduction MMT = 4+/5 2/21/23           Long Term Goals: To be accomplished in 4 weeks:  1. Patient will demonstrate a significant functional improvement as demonstrated by a score of >/= 65 on FOTO. Eval: FOTO = 57  Current: Progressing: FOTO = 63 2/23/23       2. Patient will subjectively report symptoms at worst in last week </= 3/10 to improve overall quality of life. Eval: Pain at Worst (last week) = 7/10  Current: Goal met. Pain at worst (last week) = 3/10.  2/7/2023.    3. Patient will demonstrate ability to perform squat with box lift (box + 10 lb) x10 repetitions without pain to improve ease with heavy labor. Eval: NT  Current: MET squat with box lift (box + 10lbs) of 10 reps performed with good form and no increase in pain. 2/23/23    PLAN  No  Continue plan of care  []  Upgrade activities as tolerated  [x]  Discharge due to :patient request and met most goals.    []  Other:    Shree Eric PTA    2/23/2023    8:11 AM    Future Appointments   Date Time Provider Kasey Jazmine   2/23/2023  9:00 AM Stefanie Oliver PTA [facility] [address]   3/7/2023 11:45 AM MD TAHIRA Chandler III BS AMB   4/6/2023 12:15 PM Hesed Johnny Zhao MD [facility] BS AMB   5/2/2023  9:30 AM Curtis Banuelos MD [facility] BS AMB   12/4/2023 11:15 AM Hesed Johnny Zhao MD [facility] BS AMB

## 2023-02-27 NOTE — PROGRESS NOTES
[facility] PHYSICAL THERAPY  [address] [place], [address] [address] [address] Ph: [phone] Fx: [phone]  DISCHARGE SUMMARY FOR PHYSICAL THERAPY          Patient Name: Jaskaran Alicia : 1955   Treatment/Medical Diagnosis: Other low back pain [M54.59]   Onset Date:      Referral Source: [facility]):  2023   Prior Hospitalization: See Medical History Provider #: [id number]   Prior Level of Function:  Retired, Ambulation without AD, No Hx Falls, [employer] for [employer] (Volunteer, 2x/week), (I) Self-Care ADLs, Active with [employer]   Comorbidities:  Diabetes mellitus, BMI>30, Coronary Disease, Glaucoma, Atrial fibrillation, Former Smoker, Quadruple Cardiac Bypass (), HTN, Left/Right TKA (, ), Arthritis, Peripheral Neuropathy   Medications: Verified on Patient Summary List   Visits from [facility]: 8 Missed Visits: 0   Reporting period: 2023-2023    Key Functional Changes/Progress:   Short Term Goals: To be accomplished in 2 weeks:  1. Patient will subjectively report full compliance with prescribed HEP. Eval: HEP provided  Current: Met, HEP performance reported as prescribed, 2023  2. Patient will demonstrate standing lumbar flexion AROM </= 25% limited without pain to improve ease with functional lifting. Eval: Standing Lumbar Flexion AROM = 25% limited (left lumbar pain 2/10)  Current:  Goal Met. Standing Lumbar flexion AROM = WNL without pain. 2023. 3. Patient will demonstrate left/right hip abduction MMT 5/5 to improve ease with recreational ADLs. Eval: Left/Right Hip Abduction MMT = 4+/5  Current: remains:  Left/Right Hip Abduction MMT = 4+/5 23  Long Term Goals: To be accomplished in 4 weeks:  1. Patient will demonstrate a significant functional improvement as demonstrated by a score of >/= 65 on FOTO. Eval: FOTO = 57  Current: Progressing: FOTO = 63 23       2.  Patient will subjectively report symptoms at worst in last week </= 3/10 to improve overall quality of life. Eval: Pain at Worst (last week) = 7/10  Current: Goal met. Pain at worst (last week) = 3/10.  2/7/2023. 3. Patient will demonstrate ability to perform squat with box lift (box + 10 lb) x10 repetitions without pain to improve ease with heavy labor. Eval: NT  Current: MET squat with box lift (box + 10lbs) of 10 reps performed with good form and no increase in pain. 2/23/23    RECOMMENDATIONS  Patient reports 80% in overall improvement of symptoms. Patient has progressed well with lumbar mobility, tolerance to activities, and lifting mechanics. Provided patient with updated HEP to help maintain mobility and increase strength. Discontinue therapy. Progressing towards or have reached established goals. If you have any questions/comments please contact us directly at [phone]. Thank you for allowing us to assist in the care of your patient.     Shahriar Stephen, PT       2/27/2023       2:40 PM

## 2023-03-07 ENCOUNTER — OFFICE VISIT (OUTPATIENT)
Age: 68
End: 2023-03-07
Payer: MEDICARE

## 2023-03-07 VITALS
RESPIRATION RATE: 19 BRPM | HEART RATE: 95 BPM | TEMPERATURE: 97 F | WEIGHT: 229 LBS | BODY MASS INDEX: 33.82 KG/M2 | OXYGEN SATURATION: 96 %

## 2023-03-07 DIAGNOSIS — M51.36 DDD (DEGENERATIVE DISC DISEASE), LUMBAR: ICD-10-CM

## 2023-03-07 DIAGNOSIS — M54.16 LUMBAR NEURITIS: Primary | ICD-10-CM

## 2023-03-07 DIAGNOSIS — M43.10 SPONDYLOLISTHESIS, ACQUIRED: ICD-10-CM

## 2023-03-07 DIAGNOSIS — M47.816 SPONDYLOSIS WITHOUT MYELOPATHY OR RADICULOPATHY, LUMBAR REGION: ICD-10-CM

## 2023-03-07 PROCEDURE — 99213 OFFICE O/P EST LOW 20 MIN: CPT | Performed by: PHYSICAL MEDICINE & REHABILITATION

## 2023-03-07 PROCEDURE — 1123F ACP DISCUSS/DSCN MKR DOCD: CPT | Performed by: PHYSICAL MEDICINE & REHABILITATION

## 2023-03-07 RX ORDER — METOPROLOL SUCCINATE 100 MG/1
TABLET, EXTENDED RELEASE ORAL
Qty: 90 TABLET | Refills: 3 | Status: SHIPPED | OUTPATIENT
Start: 2023-03-07

## 2023-03-07 NOTE — PROGRESS NOTES
[facility]  [address], [address]  Phone: [phone]  Fax: [phone]        PROGRESS NOTE      HISTORY OF PRESENT ILLNESS:  The patient is a 75 y.o. male and was seen today for follow up of localized low back pain, BLE symptoms resolved. Previously seen for low back pain radiating to the BLE(L=R) laterally to the knee, does not go below the knee. Patient says pain started 5-6 years ago without injury or trauma. Patient says the pain has progressively gotten worse. Patient says pain is intermittent, and notes that it is worst in  the morning. His pain is exacerbated by standing or sitting for long periods of time. Patient also says his pain varies depending on activity. Patient reports a stabbing pain is his low back pain. Patient denies recent fevers, weight loss, rashes or skin  sores. No stomach ulcers or bleeding disorders. No history of spinal surgery or injections. Patient denies recent physical therapy or chiropractic care in the past. Patient reports that to his knowledge his kidneys function properly. Pt denies change in  bowel or bladder habits. The patient has a history of DM and reports blood sugars are well controlled, consistently remaining below 200, and is followed by Dr. Moris Dixon, PCP, for his DM. Patient says he had a cortisol injection in his knees and his hand,  and says he tolerated it well. Patient says he takes tylenol with minimal relief. Patient says he has some kind of implantable device, but he is unsure if it is MRI compatible. Patient is allergic to  MDP due to muscle weakness. Patient previously tolerated Gabapentin 100 mg TID in the past for numbness and tingling with some relief. Patient has an implantable cardiac monitor inside of him that is MRI conditional. PmHx of  Coronary disease, Glaucoma, A-fib, Former Smoker, Bypass Grafting, DM, HTN.  Note from Dr. Anya Ambrocio dated 11/7/2022 indicating patient was seen with c/o bilateral knee pain. Hx of Bilateral knee arthoplasty. Referred to me and to PT. See patient back as needed. Lumbar spine plain films dated 2023. 2 views: AP and lateral. Revealed: Left iliac crest elevated compared to the right. Slight retrolisthesis L1 on L2 and L2 on L3. Pan lumbar degenerative disc disease. At his last clinical appointment,  Patient said he had some kind of  implantable device, but he is unsure if it is MRI compatible. I requested that the patient brings in papers for this at the next office visit. I referred him to physical therapy with an emphasis on HEP. I advised the patient to self advocate for  themselves at PT. Pt declined neuropathic medication. Patient was not interested in surgery or injection at this time. The patient returns today with  localized low back pain, BLE symptoms resolved. He rates his pain 0-6/10, previously 2-7/10. Patient says that overall his pain is better when compared to the last office visit, 60% better. Patient is done with PT and says he has had relief with PT. He is compliant with his HEP. Pt denies change in bowel or bladder habits.  reviewed. Body mass index is 33.82 kg/m². PCP: Fco Lopez MD      Past Medical History:   Diagnosis Date    Bypass graft mechanical complication ([facility] [address]     quadropple    Diabetes ([facility] [address]     Glaucoma     History of staph infection     knee    HTN (hypertension)     Hyperlipemia     SOB (shortness of breath)        Social History     Socioeconomic History    Marital status:      Spouse name: None    Number of children: None    Years of education: None    Highest education level: None   Tobacco Use    Smoking status: Former     Packs/day: 0.50     Types: Cigarettes     Quit date: 1970     Years since quittin.2    Smokeless tobacco: Never   Substance and Sexual Activity    Alcohol use:  Yes     Alcohol/week: 1.0 standard drink    Drug use: Never     Social Determinants of Health     Physical Activity: Unknown    Days of Exercise per Week: 0 days   Intimate Partner Violence: Not At Risk    Fear of Current or Ex-Partner: No    Emotionally Abused: No    Physically Abused: No    Sexually Abused: No       Current Outpatient Medications   Medication Sig Dispense Refill    metoprolol succinate (TOPROL XL) 100 MG extended release tablet TAKE 1 TABLET BY MOUTH  NIGHTLY 90 tablet 3    aspirin 81 MG EC tablet Take 81 mg by mouth daily      coenzyme Q10 100 MG CAPS capsule Take 100 mg by mouth daily      colesevelam (WELCHOL) 625 MG tablet TAKE 2 TABLETS BY MOUTH EVERY DAY      isosorbide mononitrate (IMDUR) 30 MG extended release tablet TAKE 1 TABLET BY MOUTH  DAILY      latanoprost (XALATAN) 0.005 % ophthalmic solution Apply 1 drop to eye      lisinopril (PRINIVIL;ZESTRIL) 5 MG tablet Take 5 mg by mouth daily      metFORMIN (GLUCOPHAGE-XR) 500 MG extended release tablet TAKE 1 TABLET BY MOUTH  TWICE DAILY WITH MEALS      rosuvastatin (CRESTOR) 5 MG tablet TAKE 1 TABLET BY MOUTH AT  NIGHT      brimonidine (ALPHAGAN P) 0.1 % SOLN 1 drop 2 times daily      Lancets MISC Check blood glucose  daily. Patient would like One Touch Delica Plus Lancets       No current facility-administered medications for this visit. Allergies   Allergen Reactions    Ezetimibe Other (See Comments)     Muscle joint pain    Heparin Other (See Comments)     thrombocytopenia    Methylprednisolone Other (See Comments)     Muscle weakness    Statins Other (See Comments)     Muscle weakness  Muscle joint pain            PHYSICAL EXAMINATION    Pulse 95   Temp 97 °F (36.1 °C)   Resp 19   Wt 229 lb (103.9 kg)   SpO2 96%   BMI 33.82 kg/m²     CONSTITUTIONAL: NAD, A&O x 3  SENSATION: Sensation is intact to light touch throughout.    MOTOR:  Straight Leg Raise: Negative, bilateral    Ambulates without an assistive device     Hip Flex Knee Ext Knee Flex Ankle DF GTE Ankle PF Tone   Right +4/5 +4/5 +4/5 +4/5 +4/5 +4/5 +4/5   Left +4/5 +4/5 +4/5 +4/5 +4/5 +4/5 +4/5       ASSESSMENT   Efrain Martins was seen today for back problem. Diagnoses and all orders for this visit:    Lumbar neuritis    Spondylosis without myelopathy or radiculopathy, lumbar region    DDD (degenerative disc disease), lumbar    Spondylolisthesis, acquired        IMPRESSION AND PLAN:  Patient returns to the office today with c/o  localized low back pain, BLE symptoms resolved. Multiple treatment options were discussed. I recommended the patient continue to perform his HEP everyday. Patient is not interested in surgery or injection at this time. I offered neuropathic medication, pt declined. Patient says his pain is tolerable at this time and is not interested in further treatment. Patient is neurologically intact. I will see the patient back as needed. Written by Yasmin Powell, as dictated by Santi Rodriguez MD  I examined the patient, reviewed and agree with the note.

## 2023-05-04 RX ORDER — ROSUVASTATIN CALCIUM 5 MG/1
TABLET, COATED ORAL
Qty: 90 TABLET | Refills: 2 | Status: SHIPPED | OUTPATIENT
Start: 2023-05-04

## 2023-05-04 RX ORDER — METFORMIN HYDROCHLORIDE 500 MG/1
TABLET, EXTENDED RELEASE ORAL
Qty: 180 TABLET | Refills: 2 | Status: SHIPPED | OUTPATIENT
Start: 2023-05-04

## 2023-05-11 RX ORDER — COLESEVELAM 180 1/1
TABLET ORAL
Qty: 180 TABLET | Refills: 1 | Status: SHIPPED | OUTPATIENT
Start: 2023-05-11

## 2023-05-19 ENCOUNTER — OFFICE VISIT (OUTPATIENT)
Age: 68
End: 2023-05-19
Payer: MEDICARE

## 2023-05-19 VITALS
DIASTOLIC BLOOD PRESSURE: 76 MMHG | WEIGHT: 231 LBS | HEART RATE: 83 BPM | BODY MASS INDEX: 34.21 KG/M2 | OXYGEN SATURATION: 96 % | SYSTOLIC BLOOD PRESSURE: 132 MMHG | HEIGHT: 69 IN

## 2023-05-19 DIAGNOSIS — Z95.1 PRESENCE OF AORTOCORONARY BYPASS GRAFT: ICD-10-CM

## 2023-05-19 DIAGNOSIS — I48.0 PAROXYSMAL ATRIAL FIBRILLATION (HCC): ICD-10-CM

## 2023-05-19 DIAGNOSIS — I25.119 ATHEROSCLEROSIS OF NATIVE CORONARY ARTERY OF NATIVE HEART WITH ANGINA PECTORIS (HCC): Primary | ICD-10-CM

## 2023-05-19 DIAGNOSIS — E78.5 HYPERLIPIDEMIA, UNSPECIFIED HYPERLIPIDEMIA TYPE: ICD-10-CM

## 2023-05-19 PROCEDURE — 99214 OFFICE O/P EST MOD 30 MIN: CPT | Performed by: INTERNAL MEDICINE

## 2023-05-19 PROCEDURE — 1123F ACP DISCUSS/DSCN MKR DOCD: CPT | Performed by: INTERNAL MEDICINE

## 2023-05-19 ASSESSMENT — PATIENT HEALTH QUESTIONNAIRE - PHQ9
SUM OF ALL RESPONSES TO PHQ QUESTIONS 1-9: 0
SUM OF ALL RESPONSES TO PHQ9 QUESTIONS 1 & 2: 0
1. LITTLE INTEREST OR PLEASURE IN DOING THINGS: 0
SUM OF ALL RESPONSES TO PHQ QUESTIONS 1-9: 0
2. FEELING DOWN, DEPRESSED OR HOPELESS: 0
SUM OF ALL RESPONSES TO PHQ QUESTIONS 1-9: 0
DEPRESSION UNABLE TO ASSESS: FUNCTIONAL CAPACITY MOTIVATION LIMITS ACCURACY
SUM OF ALL RESPONSES TO PHQ QUESTIONS 1-9: 0

## 2023-05-19 NOTE — PROGRESS NOTES
1. Have you been to the ER, urgent care clinic since your last visit? Hospitalized since your last visit? No    2. Have you seen or consulted any other health care providers outside of the [address] [place] [address] since your last visit? Include any pap smears or colon screening.       No

## 2023-05-19 NOTE — PROGRESS NOTES
HISTORY OF PRESENT ILLNESS  Kala Collins is a 75 y.o. male. 2/12/2021  Patient seen today for new patient evaluation he has history of CAD, paroxysmal A. fib prior CABG and hyperlipidemia. Patient is recovering of increasing shortness of breath. Exertional shortness of breath is persistent since CABG    Patient has increased shortness of breath on exertion happens with climbing stairs his activity. Occasional chest pain which are short lasting left-sided not related activity or exertion. Denies orthopnea PND no peripheral edema. Patient had prior PCI's and subsequent CABG in December 2019. He had a complex course requiring reopening of the chest.  Postoperatively he had atrial fibrillation with no recurrence after that. His anticoagulation has been discontinued since then. 9/2021  Patient is here for follow-up. Patient has again noticed increasing shortness of breath. Happens on minimal activity exertion. Also complain of left-sided dull pain not related to activity exertion lasting less than a minute at rest.    Follow-up  Associated symptoms include shortness of breath. Pertinent negatives include no chest pain, no abdominal pain and no headaches. Review of Systems   Constitutional:  Negative for chills and fever. HENT:  Negative for nosebleeds. Eyes:  Negative for blurred vision and double vision. Respiratory:  Positive for shortness of breath. Negative for cough, hemoptysis, sputum production and wheezing. Cardiovascular:  Negative for chest pain, palpitations, orthopnea, claudication, leg swelling and PND. Gastrointestinal:  Negative for abdominal pain, heartburn, nausea and vomiting. Musculoskeletal:  Negative for myalgias. Skin:  Negative for rash. Neurological:  Negative for dizziness, weakness and headaches. Endo/Heme/Allergies:  Does not bruise/bleed easily.    Family History   Problem Relation Age of Onset    Heart Attack Brother     Anemia Brother     Heart Disease

## 2023-06-26 ENCOUNTER — OFFICE VISIT (OUTPATIENT)
Age: 68
End: 2023-06-26
Payer: MEDICARE

## 2023-06-26 VITALS
HEIGHT: 69 IN | WEIGHT: 226 LBS | DIASTOLIC BLOOD PRESSURE: 60 MMHG | SYSTOLIC BLOOD PRESSURE: 92 MMHG | BODY MASS INDEX: 33.47 KG/M2 | HEART RATE: 98 BPM | OXYGEN SATURATION: 97 %

## 2023-06-26 DIAGNOSIS — Z95.1 PRESENCE OF AORTOCORONARY BYPASS GRAFT: ICD-10-CM

## 2023-06-26 DIAGNOSIS — I25.119 ATHEROSCLEROSIS OF NATIVE CORONARY ARTERY OF NATIVE HEART WITH ANGINA PECTORIS (HCC): Primary | ICD-10-CM

## 2023-06-26 DIAGNOSIS — E78.5 HYPERLIPIDEMIA, UNSPECIFIED HYPERLIPIDEMIA TYPE: ICD-10-CM

## 2023-06-26 DIAGNOSIS — I48.0 PAROXYSMAL ATRIAL FIBRILLATION (HCC): ICD-10-CM

## 2023-06-26 PROCEDURE — 1123F ACP DISCUSS/DSCN MKR DOCD: CPT | Performed by: NURSE PRACTITIONER

## 2023-06-26 PROCEDURE — 99214 OFFICE O/P EST MOD 30 MIN: CPT | Performed by: NURSE PRACTITIONER

## 2023-07-19 ENCOUNTER — OFFICE VISIT (OUTPATIENT)
Facility: CLINIC | Age: 68
End: 2023-07-19
Payer: MEDICARE

## 2023-07-19 VITALS
HEIGHT: 69 IN | RESPIRATION RATE: 20 BRPM | BODY MASS INDEX: 34.21 KG/M2 | HEART RATE: 75 BPM | SYSTOLIC BLOOD PRESSURE: 127 MMHG | TEMPERATURE: 97.6 F | DIASTOLIC BLOOD PRESSURE: 72 MMHG | WEIGHT: 231 LBS | OXYGEN SATURATION: 98 %

## 2023-07-19 DIAGNOSIS — E66.9 OBESITY (BMI 30-39.9): ICD-10-CM

## 2023-07-19 DIAGNOSIS — E11.65 TYPE 2 DIABETES MELLITUS WITH HYPERGLYCEMIA, WITHOUT LONG-TERM CURRENT USE OF INSULIN (HCC): ICD-10-CM

## 2023-07-19 DIAGNOSIS — R39.9 LOWER URINARY TRACT SYMPTOMS (LUTS): ICD-10-CM

## 2023-07-19 DIAGNOSIS — I83.93 VARICOSE VEINS OF BOTH LOWER EXTREMITIES, UNSPECIFIED WHETHER COMPLICATED: Primary | ICD-10-CM

## 2023-07-19 DIAGNOSIS — D75.829 HEPARIN INDUCED THROMBOCYTOPENIA (HCC): ICD-10-CM

## 2023-07-19 DIAGNOSIS — M79.89 LEG SWELLING: ICD-10-CM

## 2023-07-19 DIAGNOSIS — E78.5 HYPERLIPIDEMIA, UNSPECIFIED HYPERLIPIDEMIA TYPE: ICD-10-CM

## 2023-07-19 DIAGNOSIS — Z12.5 SCREENING FOR MALIGNANT NEOPLASM OF PROSTATE: ICD-10-CM

## 2023-07-19 DIAGNOSIS — I10 ESSENTIAL (PRIMARY) HYPERTENSION: ICD-10-CM

## 2023-07-19 PROCEDURE — 99215 OFFICE O/P EST HI 40 MIN: CPT | Performed by: FAMILY MEDICINE

## 2023-07-19 PROCEDURE — 3078F DIAST BP <80 MM HG: CPT | Performed by: FAMILY MEDICINE

## 2023-07-19 PROCEDURE — 1123F ACP DISCUSS/DSCN MKR DOCD: CPT | Performed by: FAMILY MEDICINE

## 2023-07-19 PROCEDURE — 3074F SYST BP LT 130 MM HG: CPT | Performed by: FAMILY MEDICINE

## 2023-07-19 SDOH — ECONOMIC STABILITY: HOUSING INSECURITY
IN THE LAST 12 MONTHS, WAS THERE A TIME WHEN YOU DID NOT HAVE A STEADY PLACE TO SLEEP OR SLEPT IN A SHELTER (INCLUDING NOW)?: NO

## 2023-07-19 SDOH — ECONOMIC STABILITY: FOOD INSECURITY: WITHIN THE PAST 12 MONTHS, THE FOOD YOU BOUGHT JUST DIDN'T LAST AND YOU DIDN'T HAVE MONEY TO GET MORE.: NEVER TRUE

## 2023-07-19 SDOH — ECONOMIC STABILITY: FOOD INSECURITY: WITHIN THE PAST 12 MONTHS, YOU WORRIED THAT YOUR FOOD WOULD RUN OUT BEFORE YOU GOT MONEY TO BUY MORE.: NEVER TRUE

## 2023-07-19 SDOH — ECONOMIC STABILITY: INCOME INSECURITY: HOW HARD IS IT FOR YOU TO PAY FOR THE VERY BASICS LIKE FOOD, HOUSING, MEDICAL CARE, AND HEATING?: NOT VERY HARD

## 2023-07-19 ASSESSMENT — PATIENT HEALTH QUESTIONNAIRE - PHQ9
SUM OF ALL RESPONSES TO PHQ QUESTIONS 1-9: 0
SUM OF ALL RESPONSES TO PHQ9 QUESTIONS 1 & 2: 0
SUM OF ALL RESPONSES TO PHQ QUESTIONS 1-9: 0
1. LITTLE INTEREST OR PLEASURE IN DOING THINGS: 0
SUM OF ALL RESPONSES TO PHQ QUESTIONS 1-9: 0
SUM OF ALL RESPONSES TO PHQ QUESTIONS 1-9: 0
2. FEELING DOWN, DEPRESSED OR HOPELESS: 0

## 2023-07-19 NOTE — PROGRESS NOTES
1. \"Have you been to the ER, urgent care clinic since your last visit? Hospitalized since your last visit? \"No    2. \"Have you seen or consulted any other health care providers outside of the [address] [place] [address] since your last visit? \" No    3. For patients aged 43-73: Has the patient had a colonoscopy / FIT/ Cologuard? No      If the patient is female:    4. For patients aged 43-66: Has the patient had a mammogram within the past 2 years? Not applicable      5. For patients aged 21-65: Has the patient had a pap smear?  Not applicable

## 2023-07-19 NOTE — PROGRESS NOTES
JENN  Audrey Lima comes in for follow up care. Varicose veins/leg swelling: Patient has varicose veins bilateral lower extremities. He also has noticed some swelling bilateral upper and lower extremities. He is concerned about blood clots and other hematological abnormalities given the varicose veins. I discussed varicose veins, symptomatology and pathophysiology. Patient will be referred to see the vascular specialist.  In the meantime I will order duplex ultrasound bilateral lower extremities to evaluate for any DVT. 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 We will recheck labs. Currently stable. HTN: Patient has hypertension. Blood pressure is stable. Denies headache, changes in vision or focal weakness. Patient is on lisinopril, metoprolol and Imdur. We will continue with these medications. He will take a low-sodium diet. DM2: Patient has type 2 diabetes mellitus. He is on metformin. Blood glucose numbers have been stable. He will intensify lifestyle and dietary modification. His HbA1c is 7.7. We will continue current management plan. He will keep a blood glucose log and I will follow-up at next visit. BPH/LUTS: Patient has lower urinary tract symptoms and BPH. He also has nocturia. Has seen the urologist and he is on Flomax. This has helped with the symptoms somewhat. He takes a lot of coffee prior to bedtime. We discussed good sleep hygiene techniques. Dyslipidemia: Patient has dyslipidemia. He is on Crestor. He takes 5 mg at bedtime. He will continue with the current treatment plan. He will exercise and take a diet low in polysaturated fats. CAD: Patient has coronary artery disease. Denies chest pain, shortness of breath or diaphoresis. He has had quadruple bypass. He is on lisinopril, metoprolol, Crestor, aspirin, Imdur.   He will continue with follow-up as recommended by the cardiologist.  Shannon Frias and fatigue: Patient has

## 2023-07-21 LAB
A/G RATIO: 1.4 RATIO (ref 1.1–2.6)
ALBUMIN SERPL-MCNC: 4.2 G/DL (ref 3.5–5)
ALP BLD-CCNC: 69 U/L (ref 40–125)
ALT SERPL-CCNC: 29 U/L (ref 5–40)
ANION GAP SERPL CALCULATED.3IONS-SCNC: 12 MMOL/L (ref 3–15)
AST SERPL-CCNC: 20 U/L (ref 10–37)
AVERAGE GLUCOSE: 169 MG/DL (ref 91–123)
BASOPHILS # BLD: 0 % (ref 0–2)
BASOPHILS ABSOLUTE: 0 K/UL (ref 0–0.2)
BILIRUB SERPL-MCNC: 0.4 MG/DL (ref 0.2–1.2)
BUN BLDV-MCNC: 17 MG/DL (ref 6–22)
CALCIUM SERPL-MCNC: 9.9 MG/DL (ref 8.4–10.5)
CHLORIDE BLD-SCNC: 99 MMOL/L (ref 98–110)
CHOLESTEROL/HDL RATIO: 4.3 (ref 0–5)
CHOLESTEROL: 155 MG/DL (ref 110–200)
CO2: 24 MMOL/L (ref 20–32)
CREAT SERPL-MCNC: 1 MG/DL (ref 0.8–1.6)
EOSINOPHIL # BLD: 2 % (ref 0–6)
EOSINOPHILS ABSOLUTE: 0.1 K/UL (ref 0–0.5)
GLOBULIN: 3.1 G/DL (ref 2–4)
GLOMERULAR FILTRATION RATE: >60 ML/MIN/1.73 SQ.M.
GLUCOSE: 173 MG/DL (ref 70–99)
HBA1C MFR BLD: 7.5 % (ref 4.8–5.6)
HCT VFR BLD CALC: 43.9 % (ref 37.8–52.2)
HDLC SERPL-MCNC: 36 MG/DL
HEMOGLOBIN: 15 G/DL (ref 12.6–17.1)
LDL CHOLESTEROL CALCULATED: 95 MG/DL (ref 50–99)
LDL/HDL RATIO: 2.6
LYMPHOCYTES # BLD: 27 % (ref 20–45)
LYMPHOCYTES ABSOLUTE: 1.5 K/UL (ref 1–4.8)
MCH RBC QN AUTO: 34 PG (ref 26–34)
MCHC RBC AUTO-ENTMCNC: 34 G/DL (ref 31–36)
MCV RBC AUTO: 99 FL (ref 80–95)
MONOCYTES ABSOLUTE: 0.6 K/UL (ref 0.1–1)
MONOCYTES: 11 % (ref 3–12)
NEUTROPHILS ABSOLUTE: 3.5 K/UL (ref 1.8–7.7)
NEUTROPHILS: 61 % (ref 40–75)
NON-HDL CHOLESTEROL: 119 MG/DL
PDW BLD-RTO: 12.2 % (ref 10–15.5)
PLATELET # BLD: 193 K/UL (ref 140–440)
PMV BLD AUTO: 8.9 FL (ref 9–13)
POTASSIUM SERPL-SCNC: 4.5 MMOL/L (ref 3.5–5.5)
PROSTATE SPECIFIC ANTIGEN: 2.14 NG/ML
RBC: 4.43 M/UL (ref 3.8–5.8)
SODIUM BLD-SCNC: 135 MMOL/L (ref 133–145)
TOTAL PROTEIN: 7.3 G/DL (ref 6.2–8.1)
TRIGL SERPL-MCNC: 121 MG/DL (ref 40–149)
VLDLC SERPL CALC-MCNC: 24 MG/DL (ref 8–30)
WBC: 5.7 K/UL (ref 4–11)

## 2023-07-24 ENCOUNTER — TELEPHONE (OUTPATIENT)
Dept: PHARMACY | Facility: CLINIC | Age: 68
End: 2023-07-24

## 2023-07-24 NOTE — TELEPHONE ENCOUNTER
[facility] CLINICAL PHARMACY: ADHERENCE REVIEW  Identified care gap per [employer]: fills at [facility]: Diabetes adherence    Patient also appears to be prescribed: ACE/ARB and Statin    ASSESSMENT    ACE/ARB ADHERENCE    Insurance Records claims through  23  (Prior Year 2 [address] = not reported; [address] = 100%; Potential Fail Date: 10.21.23):   LISINOPRIL TAB 5 MG last filled on 23 for 90 day supply. Next refill due: 23    Prescribed si tablet/capsule daily    Per Insurer Portal: last filled on 23 for 90 day supply. BP Readings from Last 3 Encounters:   23 127/72   23 92/60   23 132/76     Estimated Creatinine Clearance: 84 mL/min (based on SCr of 1 mg/dL). Lab Results   Component Value Date    CREATININE 1.0 2023     Lab Results   Component Value Date    K 4.5 2023     DIABETES ADHERENCE    Insurance Records claims through  23  (Prior Year  [address] = not reported; YTD [address] [place] [address] Street = FIRST FILL; Potential Fail Date: 23):   METFORMIN  MG ER last filled on 23 for 100 day supply. Next refill due: 23    Prescribed si tablet/capsule twice daily    Per Insurer Portal: last filled on 23 for 100 day supply. Per [facility]: last picked up on 23 for 100 day supply. will get 100 day supply ready to  since past due. Billed through [place] [facility]). for a $0 co pay    Lab Results   Component Value Date    LABA1C 7.5 (H) 2023    LABA1C 7.6 (H) 2022    LABA1C 7.2 (H) 2021       STATIN ADHERENCE    Insurance Records claims through  23  (Prior Year PDC = not reported; [address] = 100%; Potential Fail Date: 23):   ROSUVASTATIN TAB 5 MG last filled on 23 for 100 day supply. Next refill due: 23    Prescribed si tablet/capsule daily    Per Insurer Portal: last filled on 23 for 100 day supply.        Lab Results   Component Value Date    CHOL 155 2023    TRIG 121 2023    HDL 36

## 2023-08-02 ENCOUNTER — HOSPITAL ENCOUNTER (OUTPATIENT)
Facility: HOSPITAL | Age: 68
Discharge: HOME | End: 2023-08-04
Payer: MEDICARE

## 2023-08-02 DIAGNOSIS — M79.89 LEG SWELLING: ICD-10-CM

## 2023-08-02 DIAGNOSIS — I83.93 VARICOSE VEINS OF BOTH LOWER EXTREMITIES, UNSPECIFIED WHETHER COMPLICATED: ICD-10-CM

## 2023-08-02 PROCEDURE — 93970 EXTREMITY STUDY: CPT

## 2023-08-02 PROCEDURE — 93970 EXTREMITY STUDY: CPT | Performed by: INTERNAL MEDICINE

## 2023-08-07 ENCOUNTER — OFFICE VISIT (OUTPATIENT)
Age: 68
End: 2023-08-07
Payer: MEDICARE

## 2023-08-07 VITALS
OXYGEN SATURATION: 95 % | HEIGHT: 69 IN | BODY MASS INDEX: 34.21 KG/M2 | SYSTOLIC BLOOD PRESSURE: 117 MMHG | HEART RATE: 80 BPM | DIASTOLIC BLOOD PRESSURE: 71 MMHG | WEIGHT: 231 LBS

## 2023-08-07 DIAGNOSIS — I25.119 ATHEROSCLEROSIS OF NATIVE CORONARY ARTERY OF NATIVE HEART WITH ANGINA PECTORIS (HCC): Primary | ICD-10-CM

## 2023-08-07 DIAGNOSIS — E78.5 HYPERLIPIDEMIA, UNSPECIFIED HYPERLIPIDEMIA TYPE: ICD-10-CM

## 2023-08-07 DIAGNOSIS — I48.0 PAROXYSMAL ATRIAL FIBRILLATION (HCC): ICD-10-CM

## 2023-08-07 DIAGNOSIS — Z95.1 PRESENCE OF AORTOCORONARY BYPASS GRAFT: ICD-10-CM

## 2023-08-07 PROCEDURE — 1123F ACP DISCUSS/DSCN MKR DOCD: CPT | Performed by: NURSE PRACTITIONER

## 2023-08-07 PROCEDURE — 99214 OFFICE O/P EST MOD 30 MIN: CPT | Performed by: NURSE PRACTITIONER

## 2023-08-07 RX ORDER — ROSUVASTATIN CALCIUM 10 MG/1
10 TABLET, COATED ORAL NIGHTLY
Qty: 90 TABLET | Refills: 3 | Status: SHIPPED | OUTPATIENT
Start: 2023-08-07

## 2023-08-07 RX ORDER — NITROGLYCERIN 0.4 MG/1
0.4 TABLET SUBLINGUAL EVERY 5 MIN PRN
Qty: 25 TABLET | Refills: 3 | Status: SHIPPED | OUTPATIENT
Start: 2023-08-07

## 2023-08-07 ASSESSMENT — PATIENT HEALTH QUESTIONNAIRE - PHQ9
SUM OF ALL RESPONSES TO PHQ QUESTIONS 1-9: 0
SUM OF ALL RESPONSES TO PHQ QUESTIONS 1-9: 0
1. LITTLE INTEREST OR PLEASURE IN DOING THINGS: 0
SUM OF ALL RESPONSES TO PHQ QUESTIONS 1-9: 0
2. FEELING DOWN, DEPRESSED OR HOPELESS: 0
SUM OF ALL RESPONSES TO PHQ9 QUESTIONS 1 & 2: 0
SUM OF ALL RESPONSES TO PHQ QUESTIONS 1-9: 0

## 2023-08-07 NOTE — PROGRESS NOTES
1. Have you been to the ER, urgent care clinic since your last visit? Hospitalized since your last visit? Yes [facility] for testing ultrasound on legs    2. Have you seen or consulted any other health care providers outside of the [address] [place] [address] Avenue since your last visit? Include any pap smears or colon screening.       Yes pcp,
is on multiple medical management. Recent increase in rosuvastatin was done for elevated LDL. Follow-up lab. If echocardiogram shows no significant change or LV dysfunction consider pulmonary function test once labs open up. This would to assess for any restriction post operatively  no concern of CHF/diuretic as needed  3/2021  Echocardiogram exam with normal ejection fraction no pulmonary hypertension. Symptoms are significantly improved after stopping Lasix. As he does not have any angina and negative nuclear stress test we will also hold Ranexa. Any other treatment. We will hold off on PFT at present  9/2021  Continues to be short of breath. We will follow with PFT as well as follow-up CT for lung nodule that he had before. 5/2022  Cardiac status stable. CT scan unremarkable. Abdominal ultrasound with no aneurysm. Patient has a loop recorder that was implanted in [place] about 3-1/2 years ago battery is dead now. At present we will leave it in. Shortness of breath are stable in view of that we will not pursue PFT at present. Patient is intolerant to higher dose of statin currently able to tolerate rosuvastatin 5 mg a day. Postop A. fib no recurrence  11/2022  Cardiac status stable continue current medical management monitor. Last lab with high triglyceride continue dietary modification diabetes control. LDL in 90s currently on very low-dose of rosuvastatin. If LDL increases consider use of PCSK9 inhibitor. 5/2023  Recent new onset increased chest tightness shortness of breath still concerning about angina. Set up for nuclear stress test.  If symptoms persist and possible need for invasive evaluation  6/2023  Patient seen in follow-up for recent episodes of shortness of breath. Testing reviewed and discussed with patient nuclear stress test negative for ischemia.   Patient noted to have borderline low blood pressure in office today will follow home BP readings and adjust medications as

## 2023-08-09 ENCOUNTER — OFFICE VISIT (OUTPATIENT)
Age: 68
End: 2023-08-09
Payer: MEDICARE

## 2023-08-09 ENCOUNTER — HOSPITAL ENCOUNTER (OUTPATIENT)
Facility: HOSPITAL | Age: 68
Discharge: HOME | End: 2023-08-12
Payer: MEDICARE

## 2023-08-09 ENCOUNTER — HOSPITAL ENCOUNTER (OUTPATIENT)
Facility: HOSPITAL | Age: 68
Discharge: HOME | End: 2023-08-12

## 2023-08-09 ENCOUNTER — TELEPHONE (OUTPATIENT)
Age: 68
End: 2023-08-09

## 2023-08-09 VITALS
BODY MASS INDEX: 34.21 KG/M2 | OXYGEN SATURATION: 99 % | HEIGHT: 69 IN | SYSTOLIC BLOOD PRESSURE: 125 MMHG | HEART RATE: 66 BPM | WEIGHT: 231 LBS | DIASTOLIC BLOOD PRESSURE: 90 MMHG

## 2023-08-09 DIAGNOSIS — I25.119 ATHEROSCLEROSIS OF NATIVE CORONARY ARTERY OF NATIVE HEART WITH ANGINA PECTORIS (HCC): ICD-10-CM

## 2023-08-09 DIAGNOSIS — I83.93 ASYMPTOMATIC VARICOSE VEINS OF BILATERAL LOWER EXTREMITIES: Primary | ICD-10-CM

## 2023-08-09 LAB — LABCORP SPECIMEN COLLECTION: NORMAL

## 2023-08-09 PROCEDURE — 1123F ACP DISCUSS/DSCN MKR DOCD: CPT | Performed by: SURGERY

## 2023-08-09 PROCEDURE — 99203 OFFICE O/P NEW LOW 30 MIN: CPT | Performed by: SURGERY

## 2023-08-09 PROCEDURE — 71046 X-RAY EXAM CHEST 2 VIEWS: CPT

## 2023-08-09 NOTE — PROGRESS NOTES
Lidya Fitzpatrick    Chief Complaint   Patient presents with    Varicose Veins     Referred by Dr. Loulou Cedillo        History and Physical    Lidya Fitzpatrick is a 75 y.o. male with PMH significant for CAD s/p CABG x4, Afib (unclear if truly has Afib, not on anticoagulation), GERD, NIDDM, HIT, HLD. Morbid obesity with BMI > 34.    he presents today for varicose veins. he describes BLE varicose veins. And is particularly concerned about the appearance of his bilateral ankles. He looked this up on the internet and became concerned. S/p bilateral knee replacements, has neuropathy - states legs are in pain at all times. .   H/o multiple sports injuries to the right leg in high school    Patient is experiencing shortness of breath and chest pain and is supposed to get a heart cath    Onset of symptoms was years ago and have been stable. Patient worked in [employer] most of his life. Associated symptoms:   [x] edema  [x] varicose veins  [x] heaviness/aching  [x] fatigue  [] Pain  [] H/o or current ulcer(s)    Aggravating factors include standing. Relieving factors include elevation. Patient   [] has   [x] has not   been wearing compression stockings. Relevant history:   [] female gender  [x] Family history of venous disease: mother  [] history of pregnancy: no  [] history of DVT/PE  [x] history of vein procedure - s/p left thigh GSV harvest for CABG              The most recent PVL was reviewed and discussed with the patient. This was an acute venous duplex and shows no evidence of DVT bilaterally. .   Interpretation Summary         No evidence of deep vein thrombosis in the right lower extremity. No evidence of deep vein thrombosis in the left lower extremity. Posterior tibial artery Doppler waveforms are multiphasic bilaterally. Procedure Details    A gray scale, color Doppler imaging, spectral Doppler analysis and B-mode ultrasound was performed.  During the study longitudinal and transverse views

## 2023-08-09 NOTE — PATIENT INSTRUCTIONS
Compression [employer]      Online resources to purchase compression stockings:     www.[employer]  www. ameswalker. com  www.compressionsale. com  www.compressionstock.[employer]. [employer]    When you purchase compression stockings online, please ensure that you search for the correct height of stocking (\"knee high\" or \"thigh high\") as well as the correct compression strength (20-30mmHg). Stockings can also be purchased at [employer], drug "[employer] or similar stores but they tend to be very light compression and are not considered \"medical grade\". Stockings may be purchased at medical supply stores but tend to be more expensive there. It is important to buy the correct size and strength of compression stockings in order for them to be effective. Some brands will be sized by shoe size. However, especially if you have a lot of swelling, it may be more accurate to select a size based on individualized measurements (usually circumference at the ankle, calf and knee). If you have any questions, please feel free to contact us for help.

## 2023-08-09 NOTE — TELEPHONE ENCOUNTER
August 9, 2023 spoke with patient and he is aware of their Cath, date time, location and instructions. Was advised to call the office if he has any questions or concerns. Authorization has been obtained.  [id number] 8/19 thru 9/23

## 2023-08-10 LAB
BASOPHILS # BLD AUTO: 0 X10E3/UL (ref 0–0.2)
BASOPHILS NFR BLD AUTO: 0 %
BUN SERPL-MCNC: 18 MG/DL (ref 8–27)
BUN/CREAT SERPL: 19 (ref 10–24)
CHLORIDE SERPL-SCNC: 99 MMOL/L (ref 96–106)
CO2 SERPL-SCNC: 24 MMOL/L (ref 20–29)
CREAT SERPL-MCNC: 0.93 MG/DL (ref 0.76–1.27)
EGFRCR SERPLBLD CKD-EPI 2021: 89 ML/MIN/1.73
EOSINOPHIL # BLD AUTO: 0.1 X10E3/UL (ref 0–0.4)
EOSINOPHIL NFR BLD AUTO: 1 %
ERYTHROCYTE [DISTWIDTH] IN BLOOD BY AUTOMATED COUNT: 11.7 % (ref 11.6–15.4)
GLUCOSE SERPL-MCNC: 116 MG/DL (ref 70–99)
HCT VFR BLD AUTO: 43.8 % (ref 37.5–51)
HGB BLD-MCNC: 15.1 G/DL (ref 13–17.7)
IMM GRANULOCYTES # BLD AUTO: 0 X10E3/UL (ref 0–0.1)
IMM GRANULOCYTES NFR BLD AUTO: 0 %
LYMPHOCYTES # BLD AUTO: 2 X10E3/UL (ref 0.7–3.1)
LYMPHOCYTES NFR BLD AUTO: 27 %
MCH RBC QN AUTO: 33.7 PG (ref 26.6–33)
MCHC RBC AUTO-ENTMCNC: 34.5 G/DL (ref 31.5–35.7)
MCV RBC AUTO: 98 FL (ref 79–97)
MONOCYTES # BLD AUTO: 0.7 X10E3/UL (ref 0.1–0.9)
MONOCYTES NFR BLD AUTO: 10 %
NEUTROPHILS # BLD AUTO: 4.4 X10E3/UL (ref 1.4–7)
NEUTROPHILS NFR BLD AUTO: 62 %
PLATELET # BLD AUTO: 202 X10E3/UL (ref 150–450)
POTASSIUM SERPL-SCNC: 4.8 MMOL/L (ref 3.5–5.2)
RBC # BLD AUTO: 4.48 X10E6/UL (ref 4.14–5.8)
SODIUM SERPL-SCNC: 139 MMOL/L (ref 134–144)
WBC # BLD AUTO: 7.2 X10E3/UL (ref 3.4–10.8)

## 2023-08-11 ENCOUNTER — HOSPITAL ENCOUNTER (OUTPATIENT)
Facility: HOSPITAL | Age: 68
Setting detail: OBSERVATION
Discharge: HOME | End: 2023-08-11
Attending: INTERNAL MEDICINE | Admitting: INTERNAL MEDICINE
Payer: MEDICARE

## 2023-08-11 VITALS
DIASTOLIC BLOOD PRESSURE: 80 MMHG | WEIGHT: 230 LBS | OXYGEN SATURATION: 91 % | SYSTOLIC BLOOD PRESSURE: 144 MMHG | BODY MASS INDEX: 34.07 KG/M2 | HEIGHT: 69 IN | HEART RATE: 74 BPM | RESPIRATION RATE: 22 BRPM

## 2023-08-11 DIAGNOSIS — I25.10 ATHEROSCLEROSIS OF NATIVE CORONARY ARTERY OF NATIVE HEART WITHOUT ANGINA PECTORIS: ICD-10-CM

## 2023-08-11 DIAGNOSIS — I25.118 CORONARY ARTERY DISEASE OF NATIVE ARTERY OF NATIVE HEART WITH STABLE ANGINA PECTORIS (HCC): Primary | ICD-10-CM

## 2023-08-11 LAB
ACT BLD: 395 SECS (ref 79–138)
ECHO BSA: 2.25 M2

## 2023-08-11 PROCEDURE — G0378 HOSPITAL OBSERVATION PER HR: HCPCS

## 2023-08-11 PROCEDURE — 85347 COAGULATION TIME ACTIVATED: CPT

## 2023-08-11 PROCEDURE — 92928 PRQ TCAT PLMT NTRAC ST 1 LES: CPT | Performed by: INTERNAL MEDICINE

## 2023-08-11 PROCEDURE — 99153 MOD SED SAME PHYS/QHP EA: CPT | Performed by: INTERNAL MEDICINE

## 2023-08-11 PROCEDURE — 93458 L HRT ARTERY/VENTRICLE ANGIO: CPT | Performed by: INTERNAL MEDICINE

## 2023-08-11 PROCEDURE — 6360000004 HC RX CONTRAST MEDICATION: Performed by: INTERNAL MEDICINE

## 2023-08-11 PROCEDURE — C1887 CATHETER, GUIDING: HCPCS | Performed by: INTERNAL MEDICINE

## 2023-08-11 PROCEDURE — C1760 CLOSURE DEV, VASC: HCPCS | Performed by: INTERNAL MEDICINE

## 2023-08-11 PROCEDURE — C1725 CATH, TRANSLUMIN NON-LASER: HCPCS | Performed by: INTERNAL MEDICINE

## 2023-08-11 PROCEDURE — 93567 NJX CAR CTH SPRVLV AORTGRPHY: CPT | Performed by: INTERNAL MEDICINE

## 2023-08-11 PROCEDURE — 93005 ELECTROCARDIOGRAM TRACING: CPT | Performed by: INTERNAL MEDICINE

## 2023-08-11 PROCEDURE — C1769 GUIDE WIRE: HCPCS | Performed by: INTERNAL MEDICINE

## 2023-08-11 PROCEDURE — 76937 US GUIDE VASCULAR ACCESS: CPT | Performed by: INTERNAL MEDICINE

## 2023-08-11 PROCEDURE — C1894 INTRO/SHEATH, NON-LASER: HCPCS | Performed by: INTERNAL MEDICINE

## 2023-08-11 PROCEDURE — 6370000000 HC RX 637 (ALT 250 FOR IP): Performed by: INTERNAL MEDICINE

## 2023-08-11 PROCEDURE — 99152 MOD SED SAME PHYS/QHP 5/>YRS: CPT | Performed by: INTERNAL MEDICINE

## 2023-08-11 PROCEDURE — 2580000003 HC RX 258: Performed by: INTERNAL MEDICINE

## 2023-08-11 PROCEDURE — C1874 STENT, COATED/COV W/DEL SYS: HCPCS | Performed by: INTERNAL MEDICINE

## 2023-08-11 PROCEDURE — 76000 FLUOROSCOPY <1 HR PHYS/QHP: CPT | Performed by: INTERNAL MEDICINE

## 2023-08-11 PROCEDURE — 2500000003 HC RX 250 WO HCPCS: Performed by: INTERNAL MEDICINE

## 2023-08-11 PROCEDURE — C1713 ANCHOR/SCREW BN/BN,TIS/BN: HCPCS | Performed by: INTERNAL MEDICINE

## 2023-08-11 PROCEDURE — 6360000002 HC RX W HCPCS: Performed by: INTERNAL MEDICINE

## 2023-08-11 PROCEDURE — 2709999900 HC NON-CHARGEABLE SUPPLY: Performed by: INTERNAL MEDICINE

## 2023-08-11 PROCEDURE — C9600 PERC DRUG-EL COR STENT SING: HCPCS | Performed by: INTERNAL MEDICINE

## 2023-08-11 PROCEDURE — 93459 L HRT ART/GRFT ANGIO: CPT | Performed by: INTERNAL MEDICINE

## 2023-08-11 DEVICE — STENT ONYXNG25018UX ONYX 2.50X18RX
Type: IMPLANTABLE DEVICE | Site: GROIN | Status: FUNCTIONAL
Brand: ONYX FRONTIER™

## 2023-08-11 RX ORDER — CLOPIDOGREL 300 MG/1
TABLET, FILM COATED ORAL PRN
Status: DISCONTINUED | OUTPATIENT
Start: 2023-08-11 | End: 2023-08-11 | Stop reason: HOSPADM

## 2023-08-11 RX ORDER — SODIUM CHLORIDE 0.9 % (FLUSH) 0.9 %
5-40 SYRINGE (ML) INJECTION PRN
Status: DISCONTINUED | OUTPATIENT
Start: 2023-08-11 | End: 2023-08-11 | Stop reason: HOSPADM

## 2023-08-11 RX ORDER — MIDAZOLAM HYDROCHLORIDE 1 MG/ML
INJECTION INTRAMUSCULAR; INTRAVENOUS PRN
Status: DISCONTINUED | OUTPATIENT
Start: 2023-08-11 | End: 2023-08-11 | Stop reason: HOSPADM

## 2023-08-11 RX ORDER — SODIUM CHLORIDE 0.9 % (FLUSH) 0.9 %
5-40 SYRINGE (ML) INJECTION EVERY 12 HOURS SCHEDULED
Status: DISCONTINUED | OUTPATIENT
Start: 2023-08-11 | End: 2023-08-11 | Stop reason: HOSPADM

## 2023-08-11 RX ORDER — ACETAMINOPHEN 325 MG/1
650 TABLET ORAL EVERY 4 HOURS PRN
Status: DISCONTINUED | OUTPATIENT
Start: 2023-08-11 | End: 2023-08-11 | Stop reason: HOSPADM

## 2023-08-11 RX ORDER — SODIUM CHLORIDE 9 MG/ML
INJECTION, SOLUTION INTRAVENOUS PRN
Status: DISCONTINUED | OUTPATIENT
Start: 2023-08-11 | End: 2023-08-11 | Stop reason: HOSPADM

## 2023-08-11 RX ORDER — ATROPINE SULFATE 1 MG/ML
0.5 INJECTION, SOLUTION INTRAMUSCULAR; INTRAVENOUS; SUBCUTANEOUS
Status: DISCONTINUED | OUTPATIENT
Start: 2023-08-11 | End: 2023-08-11 | Stop reason: HOSPADM

## 2023-08-11 RX ORDER — CEFAZOLIN SODIUM 1 G/3ML
INJECTION, POWDER, FOR SOLUTION INTRAMUSCULAR; INTRAVENOUS PRN
Status: DISCONTINUED | OUTPATIENT
Start: 2023-08-11 | End: 2023-08-11 | Stop reason: HOSPADM

## 2023-08-11 RX ORDER — NITROGLYCERIN 20 MG/100ML
INJECTION INTRAVENOUS PRN
Status: DISCONTINUED | OUTPATIENT
Start: 2023-08-11 | End: 2023-08-11 | Stop reason: HOSPADM

## 2023-08-11 RX ORDER — ASPIRIN 81 MG/1
81 TABLET, CHEWABLE ORAL DAILY
Status: DISCONTINUED | OUTPATIENT
Start: 2023-08-12 | End: 2023-08-11 | Stop reason: HOSPADM

## 2023-08-11 RX ORDER — BIVALIRUDIN 250 MG/5ML
INJECTION, POWDER, LYOPHILIZED, FOR SOLUTION INTRAVENOUS PRN
Status: DISCONTINUED | OUTPATIENT
Start: 2023-08-11 | End: 2023-08-11 | Stop reason: HOSPADM

## 2023-08-11 RX ORDER — ISOSORBIDE MONONITRATE 30 MG/1
TABLET, EXTENDED RELEASE ORAL
Qty: 90 TABLET | Refills: 2 | Status: SHIPPED | OUTPATIENT
Start: 2023-08-11

## 2023-08-11 RX ORDER — FENTANYL CITRATE 50 UG/ML
INJECTION, SOLUTION INTRAMUSCULAR; INTRAVENOUS PRN
Status: DISCONTINUED | OUTPATIENT
Start: 2023-08-11 | End: 2023-08-11 | Stop reason: HOSPADM

## 2023-08-11 RX ORDER — CLOPIDOGREL BISULFATE 75 MG/1
75 TABLET ORAL DAILY
Qty: 30 TABLET | Refills: 0 | Status: SHIPPED | OUTPATIENT
Start: 2023-08-11

## 2023-08-11 RX ORDER — CLOPIDOGREL BISULFATE 75 MG/1
75 TABLET ORAL DAILY
Status: DISCONTINUED | OUTPATIENT
Start: 2023-08-12 | End: 2023-08-11 | Stop reason: HOSPADM

## 2023-08-11 NOTE — PROGRESS NOTES
Cath holding summary:    1000: Patient ambulated from waiting area without difficulty, placed on monitor 3. A&O, no c/o. ID, NPO status, allergies verified. H&P reviewed, med rec completed. PIV x2 inserted, blood sent to lab. Groin prep completed, consent ready for signature. 1115: Verbal report given to Nolvia Butterfield on Elester Buff  being transferred to [address] [place] [address] for ordered procedure. Report consisted of patient's Situation, Background, Assessment and Recommendations (SBAR). Information from the following report(s) Adult Overview and Pre Procedure Checklist was reviewed with the receiving nurse. Opportunity for questions and clarification was provided. 1315: Verbal report received from Jenni Stovall on [facility] being received from [address] [place] [address] for routine post-op. Report consisted of patient's Situation, Background, Assessment and Recommendations (SBAR). Information from the following report(s) MAR and Event Log was reviewed with the receiving nurse. Opportunity for questions and clarification was provided. Assessment completed upon patient's arrival to unit and care assumed. Procedure: Cardiac Cath  Intervention: Yes  Site: Right, Groin    1700: Verbal report given to Matias Lea RN on [facility] for transfer of care. Report consisted of patient's Situation, Background, Assessment and Recommendations (SBAR). Information from the following report(s) Adult Overview, MAR, and Event Log was reviewed with the receiving nurse. Opportunity for questions and clarification was provided.

## 2023-08-11 NOTE — DISCHARGE INSTRUCTIONS
Cardiac Catheterization with Percutaneous Coronary Intervention     Percutaneous Coronary Intervention: What to Expect at Home  Percutaneous coronary intervention (PCI) is the name for procedures that are used to open a narrowed or blocked coronary artery. The two most common PCI procedures are coronary angioplasty and coronary stent placement. Your groin or arm may have a bruise and feel sore for a day or two after a percutaneous coronary intervention (PCI). You can do light activities around the house, but nothing strenuous for several days. Make sure to carry your stent card with you. Medication compliance is key in preventing future heart attacks and keeping your artery open. This care sheet gives you a general idea about how long it will take for you to recover. But each person recovers at a different pace. Follow the steps below to get better as quickly as possible. If you have any questions about follow up instructions please call [phone], or your cardiologist's office line. If there is an emergency call 911. Site Care  Check puncture site frequently for swelling or bleeding. If there is any bleeding, lie down (if access is groin), hold firm pressure with a clean towel or wash cloth. If bleeding doesn't stop, call 911. Notify your doctor for any redness, swelling, drainage, or oozing from the puncture site. If extremity becomes cold, numb or painful go to the emergency room. You may remove the bandage from your Right, Groin in 24 hours. You may shower at that point. No hot tubs, bath tubs, or swimming for 1 week. After shower, pat the incision dry and you can place a clean band-aid over the site. No lotions, ointments, or powders over puncture site for 1 week. Use a clean band-aid over the puncture site each day for 5 days. Change band-aid daily. Cold pack or ice can be placed on the area for 10 to 20 minutes to help with the soreness of the site.      Activity  Do not do important to take your medications daily as directed. Compliance with medications and follow up appointments key in preventing future heart attacks. Call 911 if you notice any of the following symptoms  You passed out  You have severe trouble breathing  You have sudden chest pain and shortness of breath or you cough up blood. You have symptoms of a heart attack:   Chest pain, pressure, or a strange feeling in the chest  Sweating  Shortness of breath  Nausea or vomiting  Pain, pressure or a strange feeling in the back, neck, jaw, or upper belly or in one or both shoulders or arms  Lightheadedness or sudden weakness  A fast or irregular heart rate. You have been diagnosed with angina or coronary artery disease, and you have symptoms that do not go away with rest or are not getting better within 5 minutes of nitroglycerin. You have symptoms of a stroke such as:  Facial drooping  Slurred or impaired speech  Arms unable to move or move unevenly  Time matters! Minutes matter, fast action can safe your life! Calling 911 is almost always the fastest way to get lifesaving treatment. Moderate Sedation Post Op Care  For a minor procedure or surgery, you will be given a sedative to help you relax. This drug will make you sleepy. It is usually given intravenously. If you had local anesthesia, you may feel some pain and discomfort as it wears off. If you have pain do not be afraid to say so. Pain medication works better if you take it before the pain gets bad. Side effects from the sedation include:  Drowsiness  Nausea and vomiting, which should not last long. Do not make any important decisions or do anything that requires attention to detail for 24 hours. No driving for 24 hours after the procedure or operate any machinery that could be dangerous until the medication wears off. Rest when you feel tired. Drink plenty of fluids  No alcoholic beverages for 24 hours.    Eat a normal diet, unless given other

## 2023-08-11 NOTE — PRE SEDATION
Sedation Plan  ASA: class 2 - patient with mild systemic disease     Mallampati class: II - soft palate, uvula, fauces visible. Sedation plan: local anesthesia and minimal sedation    Risks, benefits, and alternatives discussed with patient and spouse.

## 2023-08-11 NOTE — H&P
Please see clinic note from [address] and Yuliya garrett for detail. I saw and examined patient and confirmed above. Patient with exertional dyspnea and chest pain concerning fo kevanina. H/O CABG in 2019 Coronary artery bypass grafting x4 with 1 artery and 3 veins, left internal mammary artery to left anterior descending, reversed greater saphenous vein to the right coronary, and reversed left greater saphenous vein as a sequential to the circ 1 (ramus) and circ 2, left endoscopic vein harvest, and SPY angiography. Stress test low risk but concerning symptoms. No interval change. Labs reviewed. Procedure explained to patient and all risk and benefit discussed with patient. Risk, benefit, complication of p LHC and possible PCI ( including but not limited to bleeding, infection, heart failure, stroke, MI, emergent bypass surgery, kidney failure, dialysis and death ) were discussed with patient and willing to proceed with procedure. DW wife as well  Proceed as planned. History and physical has been reviewed.  There have been no significant clinical changes since the completion of the originally dated History and Physical.  Will be using moderate sedation.    ------------------------------------------------------------------------------------------------------------------

## 2023-08-12 LAB
EKG ATRIAL RATE: 72 BPM
EKG DIAGNOSIS: NORMAL
EKG P AXIS: 43 DEGREES
EKG P-R INTERVAL: 184 MS
EKG Q-T INTERVAL: 380 MS
EKG QRS DURATION: 98 MS
EKG QTC CALCULATION (BAZETT): 416 MS
EKG R AXIS: 51 DEGREES
EKG T AXIS: 16 DEGREES
EKG VENTRICULAR RATE: 72 BPM

## 2023-08-12 PROCEDURE — 93010 ELECTROCARDIOGRAM REPORT: CPT | Performed by: INTERNAL MEDICINE

## 2023-08-14 ENCOUNTER — CARE COORDINATION (OUTPATIENT)
Facility: CLINIC | Age: 68
End: 2023-08-14

## 2023-08-14 RX ORDER — MULTIVIT-MIN/FA/LYCOPEN/LUTEIN .4-300-25
1 TABLET ORAL DAILY
COMMUNITY

## 2023-08-14 NOTE — CARE COORDINATION
[facility] Care Transitions Initial Follow Up Call    Call within 2 business days of discharge: Yes    Patient Current Location:  [place]    Patient  contacted the  Care Transition Nurse  by telephone to complete post hospital discharge assessment. Verified name and  with patient as identifiers. Provided introduction to self, and explanation of the Care Transition Nurse role. Patient: Lidya Fitzpatrick Patient : 1955   MRN: [id number]  Reason for Admission: Left heart cath   Discharge Date: 23 RARS: No data recorded    Last Discharge [facility] [facility]       Date Complaint Diagnosis Description Type Department Provider    23  Coronary artery disease of native artery of native heart with stable angina pectoris ([facility]) . .. Admission (Discharged) Edith Lawrence MD            Was this an external facility discharge? No Discharge Facility: N/A    Challenges to be reviewed by the provider   Additional needs identified to be addressed with provider: No  none               Method of communication with provider: none. Patient reported soreness to right groin X 2 days. Has been applying ice to help reduce swelling. Denied lump/knot to right groin, bleeding or drainage, warmth, fever, N/V,SOB or CP. Patient verbalized he has Bandaid covering puncture wound to right groin. Verified patient has started Plavix. Patient voiced he was advised to limit use of stairs. Care Transition Nurse reviewed discharge instructions and red flags with patient who verbalized understanding. The patient was given an opportunity to ask questions and does not have any further questions or concerns at this time. Were discharge instructions available to patient? Yes. Reviewed appropriate site of care based on symptoms and resources available to patient including: PCP  Specialist  CTN . The patient agrees to contact the PCP office for questions related to their healthcare.      Advance Care Planning:   Does patient have an Advance

## 2023-08-14 NOTE — CARE COORDINATION
[facility] Care Transitions Initial Follow Up Call    Call within 2 business days of discharge: Yes    Patient Current Location:  [facility] Transition Nurse contacted the patient by telephone to perform post hospital discharge assessment. Verified name and  with patient as identifiers. Provided introduction to self, and explanation of the Care Transition Nurse role. Patient: Estella Bruce Patient : 1955   MRN: [id number]  Reason for Admission: Left heart cath  Discharge Date: 23 RARS: No data recorded    Last Discharge [address] [facility]       Date Complaint Diagnosis Description Type Department Provider    23  Coronary artery disease of native artery of native heart with stable angina pectoris ([address] [facility]) . .. Admission (Discharged) Nader Westbrook MD            Was this an external facility discharge? No Discharge Facility: N/A    Challenges to be reviewed by the provider   Additional needs identified to be addressed with provider: No  none               Method of communication with provider: none. Patient reported soreness to right groin X 2 days. Has been applying ice to help reduce swelling. Denied lump/knot to right groin, bleeding or drainage, warmth, fever, N/V,SOB or CP. Patient verbalized he has Bandaid covering puncture wound to right groin. Verified patient has started Plavix. Patient voiced he was advised to limit use of stairs. CTN received called and requested to return call to patient. After completion of call, CTN attempted to reach patient. No answer. Left HIPPA compliant message. Name, role and contact information provided. Requested return call. Will attempt to contact at a later time. Advance Care Planning:   Does patient have an Advance Directive: reviewed and current.        Was patient discharged with a pulse oximeter? no       Offered patient enrollment in the Remote Patient Monitoring (RPM) program for in-home monitoring: NA.    Care Transitions 24 Hour Call

## 2023-08-15 ENCOUNTER — OFFICE VISIT (OUTPATIENT)
Age: 68
End: 2023-08-15
Payer: MEDICARE

## 2023-08-15 VITALS
HEIGHT: 69 IN | HEART RATE: 92 BPM | DIASTOLIC BLOOD PRESSURE: 65 MMHG | OXYGEN SATURATION: 93 % | BODY MASS INDEX: 33.77 KG/M2 | WEIGHT: 228 LBS | SYSTOLIC BLOOD PRESSURE: 110 MMHG

## 2023-08-15 DIAGNOSIS — I25.118 CORONARY ARTERY DISEASE OF NATIVE ARTERY OF NATIVE HEART WITH STABLE ANGINA PECTORIS (HCC): ICD-10-CM

## 2023-08-15 DIAGNOSIS — I25.10 ATHEROSCLEROSIS OF NATIVE CORONARY ARTERY OF NATIVE HEART WITHOUT ANGINA PECTORIS: Primary | ICD-10-CM

## 2023-08-15 DIAGNOSIS — I48.0 PAROXYSMAL ATRIAL FIBRILLATION (HCC): ICD-10-CM

## 2023-08-15 DIAGNOSIS — Z95.1 PRESENCE OF AORTOCORONARY BYPASS GRAFT: ICD-10-CM

## 2023-08-15 DIAGNOSIS — E78.2 MIXED HYPERLIPIDEMIA: ICD-10-CM

## 2023-08-15 PROCEDURE — 1123F ACP DISCUSS/DSCN MKR DOCD: CPT | Performed by: NURSE PRACTITIONER

## 2023-08-15 PROCEDURE — 99214 OFFICE O/P EST MOD 30 MIN: CPT | Performed by: NURSE PRACTITIONER

## 2023-08-15 NOTE — PROGRESS NOTES
1. Have you been to the ER, urgent care clinic since your last visit? Hospitalized since your last visit? Yes [facility]    2. Have you seen or consulted any other health care providers outside of the [address] [place] [address] Avenue since your last visit? Include any pap smears or colon screening.     no
TRICUSPID REGURGITATION. BALTAZAR Rest/Stress Multiple Spect3/13/2020  [employer]  Component Name Value Ref Range   EF Nuc 65     Result Impression    THIS MYOCARDIAL PERFUSION STUDY IS NORMAL   THIS IS A LOW RISK STUDY   MYOCARDIAL PERFUSION IMAGING IS NORMAL. NO ABNORMALITIES OR EVIDENCE OF ISCHEMIA OR PRIOR INFARCTION. THE CALCULATED LV EJECTION FRACTION WAS 65%. OVERALL LEFT VENTRICULAR SYSTOLIC FUNCTION WAS NORMAL WITHOUT REGIONAL WALL MOTION   ABNORMALITIES. IMPRESSION nuclear stress test-7/2020  IMPRESSION:  1. No evidence of reversible myocardial ischemia. 2. This is a low risk study. Interpretation Summary 2/2021    LV: Estimated LVEF is 55 - 60%. Normal cavity size and systolic function (ejection fraction normal). Mild concentric hypertrophy. Mild hypokinesis of the basal inferoseptal, basal inferior and basal anteroseptal wall(s). Mild (grade 1) left ventricular diastolic dysfunction. RV: Mildly dilated right ventricle. Mildly reduced systolic function. RA: Mildly dilated right atrium. PA: Pulmonary arterial systolic pressure is 20 mmHg. AO: Mild aortic root dilatation (3.9 cm). IMPRESSION ct 10/2021     No acute findings. Hepatic steatosis. Moderate colonic stool burden. FINDINGS: ultrasound 5/2022  Proximal abdominal aorta measures approximately 2.2 x 2.1 cm. Mid abdominal aorta measures 2.1 x 2 cm diameter. Distal abdominal aorta appears mildly ectatic, measuring up to 2.2 x 2.2 cm  diameter. Right common iliac artery 1.3 cm diameter. Left common iliac artery 1.3 cm diameter. Several focal areas of aortic wall calcification throughout the abdominal aorta  consistent with atherosclerotic disease. Additional calcifications at the level  of the iliac vessels. Visualized portion of the vena cava grossly unremarkable. IMPRESSION  1.  Nonaneurysmal but mildly ectatic abdominal aorta with scattered areas of  atherosclerotic wall calcification as

## 2023-08-21 ENCOUNTER — CARE COORDINATION (OUTPATIENT)
Facility: CLINIC | Age: 68
End: 2023-08-21

## 2023-08-21 RX ORDER — LISINOPRIL 5 MG/1
TABLET ORAL
Qty: 90 TABLET | Refills: 3 | Status: SHIPPED | OUTPATIENT
Start: 2023-08-21

## 2023-08-21 NOTE — CARE COORDINATION
[facility] Care Transitions Follow Up Call    Patient Current Location:  [facility] Transition Nurse contacted the patient by telephone to follow up after admission on 23. Verified name and  with patient as identifiers. Patient: Jayson Vasquez  Patient : 1955   MRN: [id number]  Reason for Admission: Left heart cath   Discharge Date: 23 RARS: No data recorded    Needs to be reviewed by the provider   Additional needs identified to be addressed with provider: No  none             Method of communication with provider: none. Patient reported bruising to right groin is improving; now yellow and purple. Patient denied lump or knot to groin to area. Addressed changes since last contact:  none  Did patient attend post hospital follow up?: Yes, 8/15/23 cardiology appt. Was follow up appointment scheduled within 7 days of discharge? Yes. Follow Up  Future Appointments   Date Time Provider [address]   2023  1:20 PM MITZI Bertrand Se - NP Len Romberg [facility]   2023 11:00 AM Katherine Mckeon MD [facility]   10/19/2023  2:45 PM MD SMA JOHNNIE Doyle [facility]   2023 11:15 AM MD SMA JOHNNIE Doyle [facility]     Non-[facility] follow up appointment(s): none    Care Transition Nurse reviewed red flags with patient and discussed any barriers to care and/or understanding of plan of care after discharge. Discussed appropriate site of care based on symptoms and resources available to patient including: PCP  Specialist  CTN . The patient agrees to contact the PCP office for questions related to their healthcare. Advance Care Planning:   reviewed and current. Patients top risk factors for readmission: medical condition-CAD,A-fib  Interventions to address risk factors:  CTN assessed patient for red flags. Patient denied bleeding/drainage, warmth, redness, fever, chills,  N/V, CP, edema, palpitations. Patient reported SOB with exertion when climbing stairs or moderate activity.

## 2023-08-29 ENCOUNTER — ENROLLMENT (OUTPATIENT)
Facility: CLINIC | Age: 68
End: 2023-08-29

## 2023-08-29 RX ORDER — CLOPIDOGREL BISULFATE 75 MG/1
75 TABLET ORAL DAILY
Qty: 90 TABLET | Refills: 3 | Status: SHIPPED | OUTPATIENT
Start: 2023-08-29

## 2023-09-06 ENCOUNTER — CARE COORDINATION (OUTPATIENT)
Facility: CLINIC | Age: 68
End: 2023-09-06

## 2023-09-06 RX ORDER — LISINOPRIL 5 MG/1
TABLET ORAL
Qty: 90 TABLET | Refills: 2 | Status: SHIPPED | OUTPATIENT
Start: 2023-09-06

## 2023-09-06 RX ORDER — ROSUVASTATIN CALCIUM 10 MG/1
10 TABLET, COATED ORAL NIGHTLY
Qty: 90 TABLET | Refills: 3 | Status: SHIPPED | OUTPATIENT
Start: 2023-09-06

## 2023-09-06 NOTE — CARE COORDINATION
thinks he will receive order by Saturday. Has enough pills to last until Sunday. and CTN assessed patient for infection red flags. Patient denied all red flags. Offered patient enrollment in the Remote Patient Monitoring (RPM) program for in-home monitoring: NA.     Care Transitions Subsequent and Final Call    Subsequent and Final Calls  Do you have any ongoing symptoms?: No  Have your medications changed?: No  Do you have any questions related to your medications?: No  Do you currently have any active services?: No  Do you have any needs or concerns that I can assist you with?: No  Identified Barriers: None  Care Transitions Interventions  Other Interventions:             Care Transition Nurse provided contact information for future needs. Plan for follow-up call in 10-14 days based on severity of symptoms and risk factors.   Plan for next call: symptom management-reassess for red flags    Karlos Lee RN

## 2023-09-08 ENCOUNTER — CARE COORDINATION (OUTPATIENT)
Facility: CLINIC | Age: 68
End: 2023-09-08

## 2023-09-08 NOTE — CARE COORDINATION
CTN contacted patient to follow up on receipt of Plavix. Patient voiced he received 90 day supply of medication yesterday.

## 2023-09-14 ENCOUNTER — CARE COORDINATION (OUTPATIENT)
Facility: CLINIC | Age: 68
End: 2023-09-14

## 2023-09-14 NOTE — CARE COORDINATION
Abdirahman Jaime - IRAJ [facility]   12/4/2023 11:15 AM Hiram Lange MD [facility]     Non-[facility] follow up appointment(s): none    Care Transition Nurse reviewed red flags with patient and discussed any barriers to care and/or understanding of plan of care after discharge. Discussed appropriate site of care based on symptoms and resources available to patient including: PCP  Specialist  When to call 911  CTN . The patient agrees to contact the PCP office for questions related to their healthcare. Advance Care Planning:   reviewed and current. Patients top risk factors for readmission: medical condition-CAD s/o left heart cath  Interventions to address risk factors: Assessment and support for treatment adherence and medication management-CTN reviewed dosing instructions for Nitroglycerin and when he needs to seek medical treatment. CTN also informed patient Nitroglycerin is only good for 6 months once bottle is opened and advised patient to refill in Jan 2024. and CTN reviewed heart attack red flags. Offered patient enrollment in the Remote Patient Monitoring (RPM) program for in-home monitoring: NA.     Care Transitions Subsequent and Final Call    Subsequent and Final Calls  Do you have any ongoing symptoms?: Yes  Onset of Patient-reported symptoms: In the past 7 days  Patient-reported symptoms: Shortness of Breath, Chest Pain, Other  Interventions for patient-reported symptoms: Notified PCP/Physician  Have your medications changed?: No  Do you have any questions related to your medications?: No  Do you currently have any active services?: No  Do you have any needs or concerns that I can assist you with?: No  Identified Barriers: None  Care Transitions Interventions  No Identified Needs                          Other Interventions:             Care Transition Nurse provided contact information for future needs.  No further follow-up call indicated based on severity of symptoms and risk

## 2023-09-20 RX ORDER — LISINOPRIL 5 MG/1
TABLET ORAL
Qty: 90 TABLET | Refills: 3 | Status: SHIPPED | OUTPATIENT
Start: 2023-09-20 | End: 2023-09-22

## 2023-09-20 RX ORDER — ROSUVASTATIN CALCIUM 10 MG/1
10 TABLET, COATED ORAL NIGHTLY
Qty: 90 TABLET | Refills: 3 | Status: SHIPPED | OUTPATIENT
Start: 2023-09-20

## 2023-09-22 ENCOUNTER — OFFICE VISIT (OUTPATIENT)
Age: 68
End: 2023-09-22
Payer: MEDICARE

## 2023-09-22 VITALS
OXYGEN SATURATION: 96 % | WEIGHT: 228 LBS | HEART RATE: 83 BPM | HEIGHT: 69 IN | BODY MASS INDEX: 33.77 KG/M2 | DIASTOLIC BLOOD PRESSURE: 62 MMHG | SYSTOLIC BLOOD PRESSURE: 104 MMHG

## 2023-09-22 DIAGNOSIS — I48.0 PAROXYSMAL ATRIAL FIBRILLATION (HCC): ICD-10-CM

## 2023-09-22 DIAGNOSIS — E78.2 MIXED HYPERLIPIDEMIA: ICD-10-CM

## 2023-09-22 DIAGNOSIS — I25.10 ATHEROSCLEROSIS OF NATIVE CORONARY ARTERY OF NATIVE HEART WITHOUT ANGINA PECTORIS: Primary | ICD-10-CM

## 2023-09-22 DIAGNOSIS — Z95.1 PRESENCE OF AORTOCORONARY BYPASS GRAFT: ICD-10-CM

## 2023-09-22 PROCEDURE — 99214 OFFICE O/P EST MOD 30 MIN: CPT | Performed by: INTERNAL MEDICINE

## 2023-09-22 PROCEDURE — 1123F ACP DISCUSS/DSCN MKR DOCD: CPT | Performed by: INTERNAL MEDICINE

## 2023-09-22 RX ORDER — LISINOPRIL 2.5 MG/1
2.5 TABLET ORAL DAILY
Qty: 90 TABLET | Refills: 3 | Status: SHIPPED | OUTPATIENT
Start: 2023-09-22

## 2023-09-22 RX ORDER — METOPROLOL SUCCINATE 50 MG/1
50 TABLET, EXTENDED RELEASE ORAL NIGHTLY
Qty: 90 TABLET | Refills: 3 | Status: SHIPPED | OUTPATIENT
Start: 2023-09-22

## 2023-09-22 ASSESSMENT — PATIENT HEALTH QUESTIONNAIRE - PHQ9
SUM OF ALL RESPONSES TO PHQ QUESTIONS 1-9: 0
1. LITTLE INTEREST OR PLEASURE IN DOING THINGS: 0
SUM OF ALL RESPONSES TO PHQ9 QUESTIONS 1 & 2: 0
DEPRESSION UNABLE TO ASSESS: FUNCTIONAL CAPACITY MOTIVATION LIMITS ACCURACY
SUM OF ALL RESPONSES TO PHQ QUESTIONS 1-9: 0
2. FEELING DOWN, DEPRESSED OR HOPELESS: 0
SUM OF ALL RESPONSES TO PHQ QUESTIONS 1-9: 0
SUM OF ALL RESPONSES TO PHQ QUESTIONS 1-9: 0

## 2023-09-22 NOTE — PROGRESS NOTES
1. Have you been to the ER, urgent care clinic since your last visit? Hospitalized since your last visit? Yes, [facility]    2. Have you seen or consulted any other health care providers outside of the [address] [place] [address] Avenue since your last visit? Include any pap smears or colon screening.       No

## 2023-09-22 NOTE — PROGRESS NOTES
HISTORY OF PRESENT ILLNESS  Johnny Jean Baptiste is a 75 y.o. male. 2/12/2021  Patient seen today for new patient evaluation he has history of CAD, paroxysmal A. fib prior CABG and hyperlipidemia. Patient is recovering of increasing shortness of breath. Exertional shortness of breath is persistent since CABG    Patient has increased shortness of breath on exertion happens with climbing stairs his activity. Occasional chest pain which are short lasting left-sided not related activity or exertion. Denies orthopnea PND no peripheral edema. Patient had prior PCI's and subsequent CABG in December 2019. He had a complex course requiring reopening of the chest.  Postoperatively he had atrial fibrillation with no recurrence after that. His anticoagulation has been discontinued since then. 9/2021  Patient is here for follow-up. Patient has again noticed increasing shortness of breath. Happens on minimal activity exertion. Also complain of left-sided dull pain not related to activity exertion lasting less than a minute at rest.    6/2023  Patient seen in follow-up for testing results. He complains of mild dyspnea on exertion denies chest pain jaw pain palpitations or edema. 8/7/2023  Patient seen in follow up for CAD. He c/o mild dyspnea on exertion, he has had episod  8/15/2023  Patient seen s/p cardiac cath with stent to ostial / prox RCA. Patient reports decrease in shortness of breath. He has mild bruising to groin area. Denies chest pain, palpitations or edema. Follow-up  Associated symptoms include shortness of breath. Pertinent negatives include no chest pain, no abdominal pain and no headaches. Review of Systems   Constitutional:  Negative for chills and fever. HENT:  Negative for nosebleeds. Eyes:  Negative for blurred vision and double vision. Respiratory:  Negative for shortness of breath, cough, hemoptysis, sputum production and wheezing.     Cardiovascular:  Negative for chest pain,

## 2023-10-19 ENCOUNTER — OFFICE VISIT (OUTPATIENT)
Facility: CLINIC | Age: 68
End: 2023-10-19
Payer: MEDICARE

## 2023-10-19 VITALS
SYSTOLIC BLOOD PRESSURE: 131 MMHG | TEMPERATURE: 97.9 F | HEART RATE: 90 BPM | DIASTOLIC BLOOD PRESSURE: 86 MMHG | RESPIRATION RATE: 20 BRPM | HEIGHT: 69 IN | OXYGEN SATURATION: 96 % | BODY MASS INDEX: 33.92 KG/M2 | WEIGHT: 229 LBS

## 2023-10-19 DIAGNOSIS — R06.02 SOB (SHORTNESS OF BREATH): ICD-10-CM

## 2023-10-19 DIAGNOSIS — I48.0 PAROXYSMAL ATRIAL FIBRILLATION (HCC): ICD-10-CM

## 2023-10-19 DIAGNOSIS — E11.65 TYPE 2 DIABETES MELLITUS WITH HYPERGLYCEMIA, WITHOUT LONG-TERM CURRENT USE OF INSULIN (HCC): ICD-10-CM

## 2023-10-19 DIAGNOSIS — R93.89 ABNORMAL CHEST CT: ICD-10-CM

## 2023-10-19 DIAGNOSIS — J18.9 PNEUMONIA OF LEFT UPPER LOBE DUE TO INFECTIOUS ORGANISM: Primary | ICD-10-CM

## 2023-10-19 DIAGNOSIS — L82.1 SEBORRHEIC KERATOSES: ICD-10-CM

## 2023-10-19 DIAGNOSIS — E78.5 HYPERLIPIDEMIA, UNSPECIFIED HYPERLIPIDEMIA TYPE: ICD-10-CM

## 2023-10-19 DIAGNOSIS — R91.8 OPACITY OF LUNG ON IMAGING STUDY: ICD-10-CM

## 2023-10-19 DIAGNOSIS — R39.9 LOWER URINARY TRACT SYMPTOMS (LUTS): ICD-10-CM

## 2023-10-19 DIAGNOSIS — Z23 ENCOUNTER FOR IMMUNIZATION: ICD-10-CM

## 2023-10-19 DIAGNOSIS — I10 ESSENTIAL (PRIMARY) HYPERTENSION: ICD-10-CM

## 2023-10-19 PROCEDURE — 3051F HG A1C>EQUAL 7.0%<8.0%: CPT | Performed by: FAMILY MEDICINE

## 2023-10-19 PROCEDURE — 3078F DIAST BP <80 MM HG: CPT | Performed by: FAMILY MEDICINE

## 2023-10-19 PROCEDURE — 99215 OFFICE O/P EST HI 40 MIN: CPT | Performed by: FAMILY MEDICINE

## 2023-10-19 PROCEDURE — 3074F SYST BP LT 130 MM HG: CPT | Performed by: FAMILY MEDICINE

## 2023-10-19 PROCEDURE — 1123F ACP DISCUSS/DSCN MKR DOCD: CPT | Performed by: FAMILY MEDICINE

## 2023-10-19 RX ORDER — LANCETS 30 GAUGE
EACH MISCELLANEOUS
Qty: 200 EACH | Refills: 3 | Status: SHIPPED | OUTPATIENT
Start: 2023-10-19

## 2023-10-19 RX ORDER — GLUCOSAMINE HCL/CHONDROITIN SU 500-400 MG
CAPSULE ORAL
Qty: 200 STRIP | Refills: 3 | Status: SHIPPED | OUTPATIENT
Start: 2023-10-19

## 2023-10-20 PROCEDURE — 90694 VACC AIIV4 NO PRSRV 0.5ML IM: CPT | Performed by: FAMILY MEDICINE

## 2023-10-20 PROCEDURE — G0008 ADMIN INFLUENZA VIRUS VAC: HCPCS | Performed by: FAMILY MEDICINE

## 2023-10-20 RX ORDER — LEVOFLOXACIN 500 MG/1
500 TABLET, FILM COATED ORAL DAILY
Qty: 7 TABLET | Refills: 0 | Status: SHIPPED | OUTPATIENT
Start: 2023-10-20 | End: 2023-10-27

## 2023-11-01 RX ORDER — COLESEVELAM 180 1/1
TABLET ORAL
Qty: 180 TABLET | Refills: 1 | Status: SHIPPED | OUTPATIENT
Start: 2023-11-01

## 2023-11-07 ENCOUNTER — TELEPHONE (OUTPATIENT)
Dept: PHARMACY | Facility: CLINIC | Age: 68
End: 2023-11-07

## 2023-11-07 NOTE — TELEPHONE ENCOUNTER
[facility] CLINICAL PHARMACY: ADHERENCE REVIEW  Identified care gap per [employer]: fills at [facility]: Diabetes adherence    Patient also appears to be prescribed: ACE/ARB and Statin    ASSESSMENT    ACE/ARB ADHERENCE    Insurance Records claims through  10.23.23  (Prior Year [address] = not reported; YTD PDC = 99% - PASSED):   LISINOPRIL TAB 2.5 MG last filled on 23 for 100 day supply. Next refill due: 23    Prescribed si tablet/capsule daily    Per Insurer Portal: last filled on 23 for 100 day supply. Per [facility]: last picked up on 23 for 90 day supply. Billed through [place] 3 refills remaining. BP Readings from Last 3 Encounters:   10/19/23 131/86   23 104/62   08/15/23 110/65     Estimated Creatinine Clearance: 89 mL/min (based on SCr of 0.93 mg/dL). Lab Results   Component Value Date    CREATININE 0.93 2023     Lab Results   Component Value Date    K 4.8 2023     DIABETES ADHERENCE    Insurance Records claims through  10.23.23  (Prior Year 2 [address] = not reported; YTD [address] = 77%; Potential Fail Date: 23):   METFORMIN  MG ER last filled on 23 for 100 day supply. Next refill due: 23 -PAST DUE 6 DAYS     Prescribed si tablet/capsule twice daily    Per Insurer Portal: last filled on 23 for 100 day supply. Per [facility]: last picked up on 23 for 100 day supply. will get 100 day supply ready to  since past due. Billed through [place] 1 refills remaining. Lab Results   Component Value Date    LABA1C 7.5 (H) 2023    LABA1C 7.6 (H) 2022    LABA1C 7.2 (H) 2021       STATIN ADHERENCE    Insurance Records claims through  10.23.23  (Prior Year PDC = not reported; YTD PDC = 100% - PASSED):   ROSUVASTATIN TAB 10 MG last filled on 10.09.23 for 100 day supply. Next refill due: 24    Prescribed si tablet/capsule daily    Per Insurer Portal: last filled on 10.09.23 for 100 day supply.      Per

## 2023-11-13 ENCOUNTER — TELEPHONE (OUTPATIENT)
Facility: CLINIC | Age: 68
End: 2023-11-13

## 2023-11-13 NOTE — TELEPHONE ENCOUNTER
----- Message from Aditya Moser sent at 11/8/2023  1:13 PM EST -----  Subject: Referral Request    Reason for referral request? patient was supposed to have a referral for a   dermatology office and has not heard from one. Please follow up to   confirm. Provider patient wants to be referred to(if known):     Provider Phone Number(if known):     Additional Information for Provider?   ---------------------------------------------------------------------------  --------------  Jenni Marine Michelle    [phone]; OK to leave message on voicemail  ---------------------------------------------------------------------------  --------------

## 2023-11-13 NOTE — TELEPHONE ENCOUNTER
Patient to call back to obtain number of Dermatologist-    ----- Message from Rolly Tim sent at 11/8/2023  1:13 PM EST -----  Subject: Referral Request    Reason for referral request? patient was supposed to have a referral for a   dermatology office and has not heard from one. Please follow up to   confirm. Provider patient wants to be referred to(if known):     Provider Phone Number(if known):     Additional Information for Provider?   ---------------------------------------------------------------------------  --------------  [address] Michelle    [phone]; OK to leave message on voicemail  ---------------------------------------------------------------------------  --------------

## 2023-12-01 SDOH — HEALTH STABILITY: PHYSICAL HEALTH: ON AVERAGE, HOW MANY DAYS PER WEEK DO YOU ENGAGE IN MODERATE TO STRENUOUS EXERCISE (LIKE A BRISK WALK)?: 0 DAYS

## 2023-12-01 ASSESSMENT — PATIENT HEALTH QUESTIONNAIRE - PHQ9
SUM OF ALL RESPONSES TO PHQ9 QUESTIONS 1 & 2: 0
SUM OF ALL RESPONSES TO PHQ QUESTIONS 1-9: 0
SUM OF ALL RESPONSES TO PHQ QUESTIONS 1-9: 0
2. FEELING DOWN, DEPRESSED OR HOPELESS: 0
SUM OF ALL RESPONSES TO PHQ QUESTIONS 1-9: 0
SUM OF ALL RESPONSES TO PHQ QUESTIONS 1-9: 0
1. LITTLE INTEREST OR PLEASURE IN DOING THINGS: 0

## 2023-12-01 ASSESSMENT — LIFESTYLE VARIABLES
HOW MANY STANDARD DRINKS CONTAINING ALCOHOL DO YOU HAVE ON A TYPICAL DAY: 1 OR 2
HOW MANY STANDARD DRINKS CONTAINING ALCOHOL DO YOU HAVE ON A TYPICAL DAY: 1
HOW OFTEN DO YOU HAVE A DRINK CONTAINING ALCOHOL: MONTHLY OR LESS
HOW OFTEN DO YOU HAVE A DRINK CONTAINING ALCOHOL: 2
HOW OFTEN DO YOU HAVE SIX OR MORE DRINKS ON ONE OCCASION: 1

## 2023-12-04 ENCOUNTER — OFFICE VISIT (OUTPATIENT)
Facility: CLINIC | Age: 68
End: 2023-12-04

## 2023-12-04 VITALS
OXYGEN SATURATION: 94 % | DIASTOLIC BLOOD PRESSURE: 76 MMHG | WEIGHT: 231 LBS | RESPIRATION RATE: 20 BRPM | BODY MASS INDEX: 34.21 KG/M2 | HEART RATE: 88 BPM | TEMPERATURE: 97.8 F | HEIGHT: 69 IN | SYSTOLIC BLOOD PRESSURE: 120 MMHG

## 2023-12-04 DIAGNOSIS — E11.65 TYPE 2 DIABETES MELLITUS WITH HYPERGLYCEMIA, WITHOUT LONG-TERM CURRENT USE OF INSULIN (HCC): ICD-10-CM

## 2023-12-04 DIAGNOSIS — I10 ESSENTIAL (PRIMARY) HYPERTENSION: ICD-10-CM

## 2023-12-04 DIAGNOSIS — Z00.00 MEDICARE ANNUAL WELLNESS VISIT, SUBSEQUENT: Primary | ICD-10-CM

## 2023-12-04 DIAGNOSIS — E66.9 OBESITY (BMI 30-39.9): ICD-10-CM

## 2023-12-04 DIAGNOSIS — I25.708 ATHEROSCLEROSIS OF CORONARY ARTERY BYPASS GRAFT(S), UNSPECIFIED, WITH OTHER FORMS OF ANGINA PECTORIS (HCC): ICD-10-CM

## 2023-12-04 DIAGNOSIS — R39.9 LOWER URINARY TRACT SYMPTOMS (LUTS): ICD-10-CM

## 2023-12-04 DIAGNOSIS — E78.5 HYPERLIPIDEMIA, UNSPECIFIED HYPERLIPIDEMIA TYPE: ICD-10-CM

## 2023-12-04 ASSESSMENT — LIFESTYLE VARIABLES
HOW MANY STANDARD DRINKS CONTAINING ALCOHOL DO YOU HAVE ON A TYPICAL DAY: 1 OR 2
HOW OFTEN DO YOU HAVE A DRINK CONTAINING ALCOHOL: MONTHLY OR LESS

## 2023-12-04 ASSESSMENT — PATIENT HEALTH QUESTIONNAIRE - PHQ9
2. FEELING DOWN, DEPRESSED OR HOPELESS: 0
SUM OF ALL RESPONSES TO PHQ QUESTIONS 1-9: 0
1. LITTLE INTEREST OR PLEASURE IN DOING THINGS: 0
SUM OF ALL RESPONSES TO PHQ QUESTIONS 1-9: 0
SUM OF ALL RESPONSES TO PHQ9 QUESTIONS 1 & 2: 0

## 2023-12-04 NOTE — PROGRESS NOTES
1. \"Have you been to the ER, urgent care clinic since your last visit? Hospitalized since your last visit? \"No    2. \"Have you seen or consulted any other health care providers outside of the [address] [place] [address] since your last visit? \" No    3. For patients aged 43-73: Has the patient had a colonoscopy / FIT/ Cologuard? Yes      If the patient is female:    4. For patients aged 43-66: Has the patient had a mammogram within the past 2 years? Not applicable      5. For patients aged 21-65: Has the patient had a pap smear?  Not applicable
CARDIAC CATH LAB    CARDIAC PROCEDURE N/A 8/11/2023    Percutaneous coronary intervention performed by Steven Barclay MD at [address] [place] CATH LAB    CATARACT REMOVAL      COLONOSCOPY N/A 03/26/2021    COLONOSCOPY performed by Brian Emmanuel MD at [address]  2010    normal    CORONARY ARTERY BYPASS GRAFT  2019    CYST REMOVAL      from back    HERNIA REPAIR Bilateral     INVASIVE VASCULAR N/A 8/11/2023    Ultrasound guided vascular access performed by Steven Barclay MD at [address] [facility] CATH LAB    TOTAL KNEE ARTHROPLASTY  2017 and 2018    rigjt and left replacement         Medications  Current Outpatient Medications   Medication Sig Dispense Refill    colesevelam (WELCHOL) 625 MG tablet TAKE 2 TABLETS BY MOUTH EVERY  tablet 1    tamsulosin (FLOMAX) 0.4 MG capsule Take 1 capsule by mouth daily 90 capsule 3    Lancets MISC Check blood glucose  2x daily. Patient would like One Touch Delica Plus Lancets 714 each 3    blood glucose monitor strips Test 2 times a day & as needed for symptoms of irregular blood glucose. Dispense sufficient amount for indicated testing frequency plus additional to accommodate PRN testing needs. 200 strip 3    metoprolol succinate (TOPROL XL) 50 MG extended release tablet Take 1 tablet by mouth nightly 90 tablet 3    lisinopril (PRINIVIL;ZESTRIL) 2.5 MG tablet Take 1 tablet by mouth daily TAKE 1 TAB BY MOUTH ONCE DAILY 90 tablet 3    rosuvastatin (CRESTOR) 10 MG tablet Take 1 tablet by mouth nightly 90 tablet 3    clopidogrel (PLAVIX) 75 MG tablet Take 1 tablet by mouth daily 90 tablet 3    Multiple Vitamins-Minerals (CENTRUM SILVER ADULT 50+) TABS Take 1 tablet by mouth daily      isosorbide mononitrate (IMDUR) 30 MG extended release tablet TAKE 1 TABLET BY MOUTH DAILY 90 tablet 2    nitroGLYCERIN (NITROSTAT) 0.4 MG SL tablet Place 1 tablet under the tongue every 5 minutes as needed for Chest pain up to max of 3 total doses. If no relief after 1 dose, call 911.

## 2023-12-15 ENCOUNTER — OFFICE VISIT (OUTPATIENT)
Age: 68
End: 2023-12-15
Payer: MEDICARE

## 2023-12-15 VITALS
DIASTOLIC BLOOD PRESSURE: 74 MMHG | BODY MASS INDEX: 34.21 KG/M2 | OXYGEN SATURATION: 96 % | HEART RATE: 80 BPM | SYSTOLIC BLOOD PRESSURE: 117 MMHG | WEIGHT: 231 LBS | HEIGHT: 69 IN

## 2023-12-15 DIAGNOSIS — Z95.5 HISTORY OF CORONARY ANGIOPLASTY WITH INSERTION OF STENT: ICD-10-CM

## 2023-12-15 DIAGNOSIS — I25.10 ATHEROSCLEROSIS OF NATIVE CORONARY ARTERY OF NATIVE HEART WITHOUT ANGINA PECTORIS: Primary | ICD-10-CM

## 2023-12-15 DIAGNOSIS — I48.0 PAROXYSMAL ATRIAL FIBRILLATION (HCC): ICD-10-CM

## 2023-12-15 DIAGNOSIS — E78.2 MIXED HYPERLIPIDEMIA: ICD-10-CM

## 2023-12-15 DIAGNOSIS — Z95.1 PRESENCE OF AORTOCORONARY BYPASS GRAFT: ICD-10-CM

## 2023-12-15 PROCEDURE — 1123F ACP DISCUSS/DSCN MKR DOCD: CPT | Performed by: INTERNAL MEDICINE

## 2023-12-15 PROCEDURE — 99214 OFFICE O/P EST MOD 30 MIN: CPT | Performed by: INTERNAL MEDICINE

## 2023-12-15 ASSESSMENT — PATIENT HEALTH QUESTIONNAIRE - PHQ9
1. LITTLE INTEREST OR PLEASURE IN DOING THINGS: 0
SUM OF ALL RESPONSES TO PHQ9 QUESTIONS 1 & 2: 0
SUM OF ALL RESPONSES TO PHQ QUESTIONS 1-9: 0
SUM OF ALL RESPONSES TO PHQ QUESTIONS 1-9: 0
DEPRESSION UNABLE TO ASSESS: FUNCTIONAL CAPACITY MOTIVATION LIMITS ACCURACY
SUM OF ALL RESPONSES TO PHQ QUESTIONS 1-9: 0
2. FEELING DOWN, DEPRESSED OR HOPELESS: 0
SUM OF ALL RESPONSES TO PHQ QUESTIONS 1-9: 0

## 2023-12-15 NOTE — PROGRESS NOTES
1. Have you been to the ER, urgent care clinic since your last visit? Hospitalized since your last visit? Yes, [facility]    2. Have you seen or consulted any other health care providers outside of the [address] [place] [address] since your last visit? Include any pap smears or colon screening.       No

## 2023-12-15 NOTE — PROGRESS NOTES
HISTORY OF PRESENT ILLNESS  Mayo Blake is a 75 y.o. male. 2/12/2021  Patient seen today for new patient evaluation he has history of CAD, paroxysmal A. fib prior CABG and hyperlipidemia. Patient is recovering of increasing shortness of breath. Exertional shortness of breath is persistent since CABG    Patient has increased shortness of breath on exertion happens with climbing stairs his activity. Occasional chest pain which are short lasting left-sided not related activity or exertion. Denies orthopnea PND no peripheral edema. Patient had prior PCI's and subsequent CABG in December 2019. He had a complex course requiring reopening of the chest.  Postoperatively he had atrial fibrillation with no recurrence after that. His anticoagulation has been discontinued since then. 9/2021  Patient is here for follow-up. Patient has again noticed increasing shortness of breath. Happens on minimal activity exertion. Also complain of left-sided dull pain not related to activity exertion lasting less than a minute at rest.    6/2023  Patient seen in follow-up for testing results. He complains of mild dyspnea on exertion denies chest pain jaw pain palpitations or edema. 8/7/2023  Patient seen in follow up for CAD. He c/o mild dyspnea on exertion, he has had episod  8/15/2023  Patient seen s/p cardiac cath with stent to ostial / prox RCA. Patient reports decrease in shortness of breath. He has mild bruising to groin area. Denies chest pain, palpitations or edema. Follow-up  Associated symptoms include shortness of breath. Pertinent negatives include no chest pain, no abdominal pain and no headaches. Review of Systems   Constitutional:  Negative for chills and fever. HENT:  Negative for nosebleeds. Eyes:  Negative for blurred vision and double vision. Respiratory:  Negative for shortness of breath, cough, hemoptysis, sputum production and wheezing.     Cardiovascular:  Negative for chest pain,

## 2024-01-10 ENCOUNTER — OFFICE VISIT (OUTPATIENT)
Age: 69
End: 2024-01-10
Payer: MEDICARE

## 2024-01-10 VITALS
WEIGHT: 229.4 LBS | HEART RATE: 93 BPM | TEMPERATURE: 97.6 F | RESPIRATION RATE: 16 BRPM | SYSTOLIC BLOOD PRESSURE: 117 MMHG | BODY MASS INDEX: 33.98 KG/M2 | HEIGHT: 69 IN | DIASTOLIC BLOOD PRESSURE: 74 MMHG | OXYGEN SATURATION: 94 %

## 2024-01-10 DIAGNOSIS — R06.09 DOE (DYSPNEA ON EXERTION): Primary | ICD-10-CM

## 2024-01-10 DIAGNOSIS — I25.10 CORONARY ARTERY DISEASE INVOLVING NATIVE CORONARY ARTERY, UNSPECIFIED WHETHER ANGINA PRESENT, UNSPECIFIED WHETHER NATIVE OR TRANSPLANTED HEART: ICD-10-CM

## 2024-01-10 DIAGNOSIS — R91.8 PULMONARY INFILTRATE: ICD-10-CM

## 2024-01-10 PROCEDURE — 94010 BREATHING CAPACITY TEST: CPT | Performed by: INTERNAL MEDICINE

## 2024-01-10 PROCEDURE — 94618 PULMONARY STRESS TESTING: CPT | Performed by: INTERNAL MEDICINE

## 2024-01-10 PROCEDURE — 1123F ACP DISCUSS/DSCN MKR DOCD: CPT | Performed by: INTERNAL MEDICINE

## 2024-01-10 PROCEDURE — 99214 OFFICE O/P EST MOD 30 MIN: CPT | Performed by: INTERNAL MEDICINE

## 2024-01-10 PROCEDURE — 94727 GAS DIL/WSHOT DETER LNG VOL: CPT | Performed by: INTERNAL MEDICINE

## 2024-01-10 ASSESSMENT — ENCOUNTER SYMPTOMS
BACK PAIN: 0
COUGH: 0
EYE REDNESS: 0
APNEA: 0
COLOR CHANGE: 0
TROUBLE SWALLOWING: 0
EYE PAIN: 0
SHORTNESS OF BREATH: 1
EYE ITCHING: 0
BLOOD IN STOOL: 0
EYE DISCHARGE: 0
CHEST TIGHTNESS: 0
CHOKING: 0
ABDOMINAL PAIN: 0
WHEEZING: 0
SORE THROAT: 0
STRIDOR: 0
PHOTOPHOBIA: 0
VOMITING: 0
FACIAL SWELLING: 0
NAUSEA: 0
CONSTIPATION: 0
DIARRHEA: 0

## 2024-01-10 NOTE — PROGRESS NOTES
Fab Chu (:  1955) is a 68 y.o. male,Established patient, here for evaluation of the following chief complaint(s):  Shortness of Breath and Follow-up         ASSESSMENT/PLAN:  1. DAMON (dyspnea on exertion)  -     PRO PULMONARY STRESS TESTING; Future  -     AMB POC GAS DILUTION(LUNG VOLUMES)  -     AMB POC DIFFUSING CAPACITY  -     AMB POC SPIROMETRY W/O BRONCHODILATOR  -     AMB POC SPIROMETRY W/BRONCHODILATOR  2. Coronary artery disease involving native coronary artery, unspecified whether angina present, unspecified whether native or transplanted heart    DAMON without evidence of exercise hypoxemia on 6 minute walk. Recent cardiac workup was negative for ischemia.  Schedule full PFT to evaluate for obstructive or restrictive physiology.   No indication for daytime supplemental O2, will continue to monitor.  If full PFT's are nondiagnostic, will schedule CPET and follow up Echo.  Schedule follow up CT chest for possible L lung infiltrate vs atelectasis  Further intervention pending test results and response to therapy    Return in about 2 months (around 3/10/2024).         Subjective   SUBJECTIVE/OBJECTIVE:  Pt with complaint of shortness of breath for years but worse over the last 14 months, initially associated with a cough which has since resolved. Both complaints are brought on by exertion eg walking up <1 flight of stairs or 50 feet on a flat surface. SOB also occurs with bending to put on shoes and relieved by rest. He denies wheezing but has occasional sternal pain radiating to both arms, though to be sequela of a complicated 4 vessel CABG in 2019. Pt is S/p PCI in .   Pt was seen in the ED in 2023 for chest pain. Stress test then was negative for ischemia.   He was told of a lung nodule on CT in 2019 however follow up CT was negative.  Pt only smoked for 2 years in his teens. 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          Review of

## 2024-01-10 NOTE — PROGRESS NOTES
Fab Chu presents today for   Chief Complaint   Patient presents with    Shortness of Breath    Follow-up       Is someone accompanying this pt? Wife     Is the patient using any DME equipment during OV? No    -DME Company NA    Depression Screenin/15/2023     8:41 AM   PHQ-9 Questionaire   Little interest or pleasure in doing things 0   Feeling down, depressed, or hopeless 0   PHQ-9 Total Score 0       Learning Needs Questionnaire:     No question data found.      Fall Risk:         12/15/2023     8:41 AM 2023    11:32 AM 2023    12:42 PM 2023    10:39 AM 2023     9:30 AM 2023     2:01 PM 2023     8:19 AM   Fall Risk   2 or more falls in past year? no no no no no no no   Fall with injury in past year? no no no no no no no        Abuse Screening:          No data to display                  Coordination of Care:    1. Have you been to the ER, urgent care clinic since your last visit? Hospitalized since your last visit? Rivka Oliveira 10/16/23-10/17/23: Pneumonia    2. Have you seen or consulted any other health care providers outside of the [facility] since your last visit? Include any pap smears or colon screening. No    Medication list has been update per patient.

## 2024-01-18 ENCOUNTER — OFFICE VISIT (OUTPATIENT)
Facility: CLINIC | Age: 69
End: 2024-01-18
Payer: MEDICARE

## 2024-01-18 VITALS
SYSTOLIC BLOOD PRESSURE: 127 MMHG | BODY MASS INDEX: 34.21 KG/M2 | DIASTOLIC BLOOD PRESSURE: 79 MMHG | TEMPERATURE: 97.8 F | HEART RATE: 89 BPM | HEIGHT: 69 IN | RESPIRATION RATE: 20 BRPM | OXYGEN SATURATION: 98 % | WEIGHT: 231 LBS

## 2024-01-18 DIAGNOSIS — E66.9 OBESITY (BMI 30-39.9): ICD-10-CM

## 2024-01-18 DIAGNOSIS — R39.9 LOWER URINARY TRACT SYMPTOMS (LUTS): ICD-10-CM

## 2024-01-18 DIAGNOSIS — I25.708 ATHEROSCLEROSIS OF CORONARY ARTERY BYPASS GRAFT(S), UNSPECIFIED, WITH OTHER FORMS OF ANGINA PECTORIS (HCC): ICD-10-CM

## 2024-01-18 DIAGNOSIS — E78.5 HYPERLIPIDEMIA, UNSPECIFIED HYPERLIPIDEMIA TYPE: ICD-10-CM

## 2024-01-18 DIAGNOSIS — E11.9 COMPREHENSIVE DIABETIC FOOT EXAMINATION, TYPE 2 DM, ENCOUNTER FOR (HCC): ICD-10-CM

## 2024-01-18 DIAGNOSIS — I10 ESSENTIAL (PRIMARY) HYPERTENSION: ICD-10-CM

## 2024-01-18 DIAGNOSIS — L98.9 SKIN LESION: ICD-10-CM

## 2024-01-18 DIAGNOSIS — E11.65 TYPE 2 DIABETES MELLITUS WITH HYPERGLYCEMIA, WITHOUT LONG-TERM CURRENT USE OF INSULIN (HCC): Primary | ICD-10-CM

## 2024-01-18 LAB — HBA1C MFR BLD: 8.1 %

## 2024-01-18 PROCEDURE — 1123F ACP DISCUSS/DSCN MKR DOCD: CPT | Performed by: FAMILY MEDICINE

## 2024-01-18 PROCEDURE — 3078F DIAST BP <80 MM HG: CPT | Performed by: FAMILY MEDICINE

## 2024-01-18 PROCEDURE — 83036 HEMOGLOBIN GLYCOSYLATED A1C: CPT | Performed by: FAMILY MEDICINE

## 2024-01-18 PROCEDURE — 99214 OFFICE O/P EST MOD 30 MIN: CPT | Performed by: FAMILY MEDICINE

## 2024-01-18 PROCEDURE — 3074F SYST BP LT 130 MM HG: CPT | Performed by: FAMILY MEDICINE

## 2024-01-18 ASSESSMENT — PATIENT HEALTH QUESTIONNAIRE - PHQ9
SUM OF ALL RESPONSES TO PHQ9 QUESTIONS 1 & 2: 0
SUM OF ALL RESPONSES TO PHQ QUESTIONS 1-9: 0
SUM OF ALL RESPONSES TO PHQ QUESTIONS 1-9: 0
1. LITTLE INTEREST OR PLEASURE IN DOING THINGS: 0
2. FEELING DOWN, DEPRESSED OR HOPELESS: 0
SUM OF ALL RESPONSES TO PHQ QUESTIONS 1-9: 0
SUM OF ALL RESPONSES TO PHQ QUESTIONS 1-9: 0

## 2024-01-18 NOTE — PROGRESS NOTES
Alcohol HPI  Fab Chu comes in for follow-up care.  Diabetes mellitus type 2: Patient has type 2 diabetes mellitus.  He is on metformin 500 mg twice a day with meals.  HbA1c is up today at 8.1.  He has not been doing much of lifestyle or dietary modification given the holiday season.  He now plans to intensify the lifestyle and dietary modification.  In fact over the past 2 days he is blood glucose numbers have started coming down.  We discussed treatment options.  I will increase metformin to 850 mg twice a day with meals.  I did a foot exam that is stable.  Sensation is good.  I will follow-up at next visit.  Dyslipidemia: Patient has dyslipidemia.  He is on rosuvastatin and WelChol.  Stable on these medications.  He will exercise and take a diet low in polysaturated fats.  Will recheck labs at next visit.  Hypertension: Patient has hypertension.  Blood pressure is stable.  He is on lisinopril, metoprolol and also takes Imdur.  Denies headache, changes in vision or focal weakness.  Will continue current treatment plan.  He will take a low-sodium diet.  Skin lesion: Patient has a skin lesion right cheek.  This occasionally bleeds.  He has referral to see the dermatologist for evaluation and possible biopsy of the lesion given that it is bleeding.  Patient states that he has the appointment scheduled for the end of this month.  LUTS: Patient has lower urinary tract symptoms.  He has urinary frequency and urgency.  He has been referred to see the urologist.  They have kept postponing his appointment.  His next appointment has also been postponed.  We discussed this.  He will call to try and get an earlier appointment.  He will get his PSA test done.  Previous PSA test was normal.  CAD: Patient has coronary artery disease.  He has had quadruple bypass surgery.  Denies chest pain, shortness of breath or diaphoresis.  He is on aspirin, Imdur, Crestor, Plavix, lisinopril, metoprolol and he has nitroglycerin to use as

## 2024-01-18 NOTE — PROGRESS NOTES
1. \"Have you been to the ER, urgent care clinic since your last visit?  Hospitalized since your last visit?\"No    2. \"Have you seen or consulted any other health care providers outside of the [facility] since your last visit?\" No    3. For patients aged 45-75: Has the patient had a colonoscopy / FIT/ Cologuard? Yes      If the patient is female:    4. For patients aged 40-74: Has the patient had a mammogram within the past 2 years? Not applicable      5. For patients aged 21-65: Has the patient had a pap smear? Not applicable

## 2024-01-25 ENCOUNTER — HOSPITAL ENCOUNTER (OUTPATIENT)
Facility: HOSPITAL | Age: 69
Discharge: HOME | End: 2024-01-25
Attending: INTERNAL MEDICINE
Payer: MEDICARE

## 2024-01-25 DIAGNOSIS — R91.8 PULMONARY INFILTRATE: ICD-10-CM

## 2024-01-25 PROCEDURE — 71250 CT THORAX DX C-: CPT

## 2024-01-29 DIAGNOSIS — E11.65 TYPE 2 DIABETES MELLITUS WITH HYPERGLYCEMIA, WITHOUT LONG-TERM CURRENT USE OF INSULIN (HCC): ICD-10-CM

## 2024-01-29 NOTE — TELEPHONE ENCOUNTER
Incoming refill request from [facility]    Refill request for Metformin-    Last proscribed and sent to [facility]    Change in pharmcy noted

## 2024-02-13 ENCOUNTER — OFFICE VISIT (OUTPATIENT)
Age: 69
End: 2024-02-13
Payer: MEDICARE

## 2024-02-13 VITALS
BODY MASS INDEX: 34.07 KG/M2 | OXYGEN SATURATION: 95 % | WEIGHT: 230 LBS | DIASTOLIC BLOOD PRESSURE: 80 MMHG | SYSTOLIC BLOOD PRESSURE: 130 MMHG | HEART RATE: 82 BPM | HEIGHT: 69 IN

## 2024-02-13 DIAGNOSIS — I83.93 ASYMPTOMATIC VARICOSE VEINS OF BILATERAL LOWER EXTREMITIES: Primary | ICD-10-CM

## 2024-02-13 PROCEDURE — 99213 OFFICE O/P EST LOW 20 MIN: CPT | Performed by: SURGERY

## 2024-02-13 PROCEDURE — 1123F ACP DISCUSS/DSCN MKR DOCD: CPT | Performed by: SURGERY

## 2024-02-13 NOTE — PROGRESS NOTES
no lipodermatosclerosis. Varicose veins are present, corona phlebectatica bilaterally. Digits no cyanosis or clubbing  Neurological:  he  is alert and oriented x3 . Gait normal.   Psych: Appropriate mood and affect.   Skin:  Skin is warm and dry. No rash noted. No erythema. No ulcers.        Impression and Plan:  Fab Chu is a 68 y.o. male with BLE varicose veins    Bilateral lower extremity varicose veins, Asymptomatic.  Again assured patient that this is not a dangerous condition     Discussed that we would not treat this unless it was causing him pain or skin problems.   In addition, patient with extensive personal and family history of CAD, already s/p CABG and repeat percutaneous intervention with stenting.  Discussed that these are the veins used for potential repeat coronary bypass grafting. Given his cardiac history I would like to preserve his veins if at all possible.     -Recommend continued compression and elevation  - will not obtain ultrasound unless symptoms develop  - patient to follow up as needed        We reviewed the plan with the patient and the patient understands.        Sanjana Jaime MD PhD  Vascular Surgery      I have personally spent more than 20 minutes today in preparation, patient care and documentation for this visit, including the following: reviewing the medical record, obtaining h/p, review of imaging studies and discussion with patient, formulation and discussion of treatment plan with the patient,  documentation in the chart.  .

## 2024-04-01 RX ORDER — CLOPIDOGREL BISULFATE 75 MG/1
75 TABLET ORAL DAILY
Qty: 90 TABLET | Refills: 1 | Status: SHIPPED | OUTPATIENT
Start: 2024-04-01

## 2024-04-18 ENCOUNTER — OFFICE VISIT (OUTPATIENT)
Facility: CLINIC | Age: 69
End: 2024-04-18
Payer: MEDICARE

## 2024-04-18 VITALS
HEART RATE: 69 BPM | HEIGHT: 69 IN | BODY MASS INDEX: 33.56 KG/M2 | TEMPERATURE: 97.6 F | SYSTOLIC BLOOD PRESSURE: 140 MMHG | DIASTOLIC BLOOD PRESSURE: 80 MMHG | OXYGEN SATURATION: 95 % | WEIGHT: 226.6 LBS | RESPIRATION RATE: 16 BRPM

## 2024-04-18 DIAGNOSIS — R39.9 LOWER URINARY TRACT SYMPTOMS (LUTS): ICD-10-CM

## 2024-04-18 DIAGNOSIS — E66.9 OBESITY (BMI 30-39.9): ICD-10-CM

## 2024-04-18 DIAGNOSIS — E11.65 TYPE 2 DIABETES MELLITUS WITH HYPERGLYCEMIA, WITHOUT LONG-TERM CURRENT USE OF INSULIN (HCC): ICD-10-CM

## 2024-04-18 DIAGNOSIS — L98.9 SKIN LESION: ICD-10-CM

## 2024-04-18 DIAGNOSIS — R07.89 OTHER CHEST PAIN: Primary | ICD-10-CM

## 2024-04-18 DIAGNOSIS — I25.708 ATHEROSCLEROSIS OF CORONARY ARTERY BYPASS GRAFT(S), UNSPECIFIED, WITH OTHER FORMS OF ANGINA PECTORIS (HCC): ICD-10-CM

## 2024-04-18 DIAGNOSIS — E78.5 HYPERLIPIDEMIA, UNSPECIFIED HYPERLIPIDEMIA TYPE: ICD-10-CM

## 2024-04-18 DIAGNOSIS — I10 ESSENTIAL (PRIMARY) HYPERTENSION: ICD-10-CM

## 2024-04-18 PROBLEM — D75.829 HEPARIN INDUCED THROMBOCYTOPENIA (HCC): Status: RESOLVED | Noted: 2020-01-30 | Resolved: 2024-04-18

## 2024-04-18 LAB
CREATININE, URINE, EXTERNAL: NORMAL
HBA1C MFR BLD: 7 %
MICROALB/CREAT RATIO, POC: NORMAL MG/G
MICROALBUMIN URINE, EXTERNAL: 80
MICROALBUMIN URINE, POC: 80 MG/L
MICROALBUMIN/CREAT UR: NORMAL MG/G{CREAT}

## 2024-04-18 PROCEDURE — 82044 UR ALBUMIN SEMIQUANTITATIVE: CPT | Performed by: FAMILY MEDICINE

## 2024-04-18 PROCEDURE — 3079F DIAST BP 80-89 MM HG: CPT | Performed by: FAMILY MEDICINE

## 2024-04-18 PROCEDURE — 93000 ELECTROCARDIOGRAM COMPLETE: CPT | Performed by: FAMILY MEDICINE

## 2024-04-18 PROCEDURE — 1123F ACP DISCUSS/DSCN MKR DOCD: CPT | Performed by: FAMILY MEDICINE

## 2024-04-18 PROCEDURE — 3077F SYST BP >= 140 MM HG: CPT | Performed by: FAMILY MEDICINE

## 2024-04-18 PROCEDURE — 83036 HEMOGLOBIN GLYCOSYLATED A1C: CPT | Performed by: FAMILY MEDICINE

## 2024-04-18 PROCEDURE — 99215 OFFICE O/P EST HI 40 MIN: CPT | Performed by: FAMILY MEDICINE

## 2024-04-18 RX ORDER — LISINOPRIL 5 MG/1
5 TABLET ORAL DAILY
Qty: 90 TABLET | Refills: 1 | Status: SHIPPED | OUTPATIENT
Start: 2024-04-18

## 2024-04-18 SDOH — ECONOMIC STABILITY: FOOD INSECURITY: WITHIN THE PAST 12 MONTHS, THE FOOD YOU BOUGHT JUST DIDN'T LAST AND YOU DIDN'T HAVE MONEY TO GET MORE.: NEVER TRUE

## 2024-04-18 SDOH — ECONOMIC STABILITY: FOOD INSECURITY: WITHIN THE PAST 12 MONTHS, YOU WORRIED THAT YOUR FOOD WOULD RUN OUT BEFORE YOU GOT MONEY TO BUY MORE.: NEVER TRUE

## 2024-04-18 SDOH — ECONOMIC STABILITY: INCOME INSECURITY: HOW HARD IS IT FOR YOU TO PAY FOR THE VERY BASICS LIKE FOOD, HOUSING, MEDICAL CARE, AND HEATING?: NOT HARD AT ALL

## 2024-04-18 ASSESSMENT — PATIENT HEALTH QUESTIONNAIRE - PHQ9
SUM OF ALL RESPONSES TO PHQ QUESTIONS 1-9: 0
SUM OF ALL RESPONSES TO PHQ9 QUESTIONS 1 & 2: 0
SUM OF ALL RESPONSES TO PHQ QUESTIONS 1-9: 0
1. LITTLE INTEREST OR PLEASURE IN DOING THINGS: NOT AT ALL
2. FEELING DOWN, DEPRESSED OR HOPELESS: NOT AT ALL

## 2024-04-18 NOTE — PROGRESS NOTES
\"Have you been to the ER, urgent care clinic since your last visit?  Hospitalized since your last visit?\"    NO    “Have you seen or consulted any other health care providers outside of [facility] since your last visit?”    NO            Click Here for Release of Records Request  
Chest pain up to max of 3 total doses. If no relief after 1 dose, call 911. 25 tablet 3    aspirin 81 MG EC tablet Take 1 tablet by mouth daily      coenzyme Q10 100 MG CAPS capsule Take 1 capsule by mouth daily      latanoprost (XALATAN) 0.005 % ophthalmic solution Place 1 drop into both eyes nightly       No current facility-administered medications for this visit.       Allergies  Allergies   Allergen Reactions    Ezetimibe Other (See Comments)     Muscle joint pain    Heparin Other (See Comments)     thrombocytopenia    Methylprednisolone Other (See Comments)     Muscle weakness    Statins Other (See Comments)     Muscle weakness  Muscle joint pain         Family History  Family History   Problem Relation Age of Onset    Heart Attack Father     Heart Disease Sister     Heart Attack Brother     Anemia Brother     Leukemia Brother        Social History  Social History     Socioeconomic History    Marital status:      Spouse name: Not on file    Number of children: Not on file    Years of education: Not on file    Highest education level: Not on file   Occupational History    Not on file   Tobacco Use    Smoking status: Former     Current packs/day: 0.00     Types: Cigarettes     Quit date: 1970     Years since quittin.3    Smokeless tobacco: Never   Substance and Sexual Activity    Alcohol use: Yes     Alcohol/week: 1.0 standard drink of alcohol     Types: 1 Standard drinks or equivalent per week     Comment: occ    Drug use: Never    Sexual activity: Defer   Other Topics Concern    Not on file   Social History Narrative    Not on file     Social Determinants of Health     Financial Resource Strain: Low Risk  (2024)    Overall Financial Resource Strain (CARDIA)     Difficulty of Paying Living Expenses: Not hard at all   Food Insecurity: No Food Insecurity (2024)    Hunger Vital Sign     Worried About Running Out of Food in the Last Year: Never true     Ran Out of Food in the Last Year:

## 2024-04-19 PROBLEM — I48.0 PAROXYSMAL ATRIAL FIBRILLATION (HCC): Status: RESOLVED | Noted: 2020-03-02 | Resolved: 2024-04-19

## 2024-04-24 RX ORDER — ISOSORBIDE MONONITRATE 30 MG/1
TABLET, EXTENDED RELEASE ORAL
Qty: 90 TABLET | Refills: 2 | Status: SHIPPED | OUTPATIENT
Start: 2024-04-24

## 2024-05-02 ENCOUNTER — OFFICE VISIT (OUTPATIENT)
Age: 69
End: 2024-05-02

## 2024-05-02 VITALS
OXYGEN SATURATION: 95 % | HEIGHT: 69 IN | BODY MASS INDEX: 33.92 KG/M2 | WEIGHT: 229 LBS | HEART RATE: 86 BPM | DIASTOLIC BLOOD PRESSURE: 80 MMHG | SYSTOLIC BLOOD PRESSURE: 126 MMHG

## 2024-05-02 DIAGNOSIS — Z09 HOSPITAL DISCHARGE FOLLOW-UP: Primary | ICD-10-CM

## 2024-05-02 DIAGNOSIS — I48.0 PAROXYSMAL ATRIAL FIBRILLATION (HCC): ICD-10-CM

## 2024-05-02 DIAGNOSIS — I25.10 ATHEROSCLEROSIS OF NATIVE CORONARY ARTERY OF NATIVE HEART WITHOUT ANGINA PECTORIS: ICD-10-CM

## 2024-05-02 DIAGNOSIS — Z95.1 PRESENCE OF AORTOCORONARY BYPASS GRAFT: ICD-10-CM

## 2024-05-02 DIAGNOSIS — E78.2 MIXED HYPERLIPIDEMIA: ICD-10-CM

## 2024-05-02 DIAGNOSIS — D68.69 SECONDARY HYPERCOAGULABLE STATE (HCC): ICD-10-CM

## 2024-05-02 ASSESSMENT — PATIENT HEALTH QUESTIONNAIRE - PHQ9
SUM OF ALL RESPONSES TO PHQ QUESTIONS 1-9: 0
2. FEELING DOWN, DEPRESSED OR HOPELESS: NOT AT ALL
SUM OF ALL RESPONSES TO PHQ QUESTIONS 1-9: 0
SUM OF ALL RESPONSES TO PHQ QUESTIONS 1-9: 0
1. LITTLE INTEREST OR PLEASURE IN DOING THINGS: NOT AT ALL
SUM OF ALL RESPONSES TO PHQ9 QUESTIONS 1 & 2: 0
SUM OF ALL RESPONSES TO PHQ QUESTIONS 1-9: 0

## 2024-05-02 NOTE — PROGRESS NOTES
1. Have you been to the ER, urgent care clinic since your last visit?  Hospitalized since your last visit?     Yes [facility] severe pain in chest and back    2. Have you seen or consulted any other health care providers outside of the [facility] since your last visit?  Include any pap smears or colon screening.       Yes pcp  
syncopal episode in the hospital.  Patient had postop A. fib.  And multiple other problems.  Extensive chart review including prior bypass surgeries cardiac catheterization and lab reviewed.  Patient had wall motion abnormality on prior echo so we will follow-up to make sure LV dysfunction has not occurred.  If we see that there is significant LV dysfunction consider cardiac catheterization with possible angina equivalent.  Patient is on multiple medical management.  Recent increase in rosuvastatin was done for elevated LDL.  Follow-up lab.  If echocardiogram shows no significant change or LV dysfunction consider pulmonary function test once labs open up.  This would to assess for any restriction post operatively  no concern of CHF/diuretic as needed  3/2021  Echocardiogram exam with normal ejection fraction no pulmonary hypertension.  Symptoms are significantly improved after stopping Lasix.  As he does not have any angina and negative nuclear stress test we will also hold Ranexa.  Any other treatment.  We will hold off on PFT at present  9/2021  Continues to be short of breath.  We will follow with PFT as well as follow-up CT for lung nodule that he had before.  5/2022  Cardiac status stable.  CT scan unremarkable.  Abdominal ultrasound with no aneurysm.  Patient has a loop recorder that was implanted in [place] about 3-1/2 years ago battery is dead now.  At present we will leave it in.  Shortness of breath are stable in view of that we will not pursue PFT at present.  Patient is intolerant to higher dose of statin currently able to tolerate rosuvastatin 5 mg a day.  Postop A. fib no recurrence  11/2022  Cardiac status stable continue current medical management monitor.  Last lab with high triglyceride continue dietary modification diabetes control.  LDL in 90s currently on very low-dose of rosuvastatin.  If LDL increases consider use of PCSK9 inhibitor.  5/2023  Recent new onset increased chest tightness

## 2024-05-07 RX ORDER — METOPROLOL SUCCINATE 50 MG/1
50 TABLET, EXTENDED RELEASE ORAL NIGHTLY
Qty: 90 TABLET | Refills: 3 | Status: SHIPPED | OUTPATIENT
Start: 2024-05-07

## 2024-05-13 ENCOUNTER — TELEPHONE (OUTPATIENT)
Age: 69
End: 2024-05-13

## 2024-05-13 NOTE — TELEPHONE ENCOUNTER
Yuliya Lares ordering a cardiac cath on patient. Auth has been submitted to [facility] and is pending Records have been faxed over to them to review.

## 2024-05-15 RX ORDER — COLESEVELAM 180 1/1
1250 TABLET ORAL DAILY
Qty: 180 TABLET | Refills: 1 | Status: SHIPPED | OUTPATIENT
Start: 2024-05-15

## 2024-05-15 NOTE — TELEPHONE ENCOUNTER
Incoming fax request from [facility] for refill on Colesevelam     Ptient was last seen on 4-    Last prescribed on 11-1-2023  #180 x 1

## 2024-05-16 ENCOUNTER — PATIENT MESSAGE (OUTPATIENT)
Age: 69
End: 2024-05-16

## 2024-05-16 NOTE — TELEPHONE ENCOUNTER
Nuclear stress test reviewed and discussed with patient negative for ischemia.  Patient denies further episodes of chest pain.  Also made aware the cardiac cath was declined.  Will continue to monitor will pursue cardiac cath for recurrence of symptoms patient verbalized understanding and is agreeable to plan will continue current medications

## 2024-05-20 NOTE — TELEPHONE ENCOUNTER
[facility] is requiring a Peer 2 Peer for patients cardiac cath that has been requested. I have scheduled a Peer 2 Peer for you for 5/20/24 @ 12:00 p.m. they will be calling the [place] office and I have made them aware. Case number is [id number]. Thank you.

## 2024-05-20 NOTE — TELEPHONE ENCOUNTER
Per NP Kiyakendall Lares patient no longer wants to have the cardiac cath.     Patient has decided he would like to wait - does not want to pursue cath.     Peer 2 Peer has been canceled.

## 2024-05-21 DIAGNOSIS — I48.0 PAROXYSMAL ATRIAL FIBRILLATION (HCC): Primary | ICD-10-CM

## 2024-05-21 NOTE — TELEPHONE ENCOUNTER
Requested Prescriptions     Pending Prescriptions Disp Refills    metoprolol succinate (TOPROL XL) 50 MG extended release tablet 90 tablet 3     Sig: Take 1 tablet by mouth nightly

## 2024-05-22 RX ORDER — METOPROLOL SUCCINATE 50 MG/1
50 TABLET, EXTENDED RELEASE ORAL NIGHTLY
Qty: 90 TABLET | Refills: 3 | Status: SHIPPED | OUTPATIENT
Start: 2024-05-22

## 2024-05-28 RX ORDER — ROSUVASTATIN CALCIUM 10 MG/1
10 TABLET, COATED ORAL NIGHTLY
Qty: 100 TABLET | Refills: 3 | Status: SHIPPED | OUTPATIENT
Start: 2024-05-28

## 2024-05-29 DIAGNOSIS — E11.65 TYPE 2 DIABETES MELLITUS WITH HYPERGLYCEMIA, WITHOUT LONG-TERM CURRENT USE OF INSULIN (HCC): ICD-10-CM

## 2024-06-02 DIAGNOSIS — E11.65 TYPE 2 DIABETES MELLITUS WITH HYPERGLYCEMIA, WITHOUT LONG-TERM CURRENT USE OF INSULIN (HCC): ICD-10-CM

## 2024-06-03 RX ORDER — LANCETS 33 GAUGE
EACH MISCELLANEOUS
Qty: 200 EACH | Refills: 3 | Status: SHIPPED | OUTPATIENT
Start: 2024-06-03

## 2024-06-03 RX ORDER — COLESEVELAM 180 1/1
1250 TABLET ORAL DAILY
Qty: 180 TABLET | Refills: 1 | OUTPATIENT
Start: 2024-06-03

## 2024-06-03 RX ORDER — BLOOD SUGAR DIAGNOSTIC
STRIP MISCELLANEOUS
Qty: 200 STRIP | Refills: 3 | Status: SHIPPED | OUTPATIENT
Start: 2024-06-03

## 2024-06-13 RX ORDER — CLOPIDOGREL BISULFATE 75 MG/1
75 TABLET ORAL DAILY
Qty: 90 TABLET | Refills: 3 | Status: SHIPPED | OUTPATIENT
Start: 2024-06-13

## 2024-06-21 ENCOUNTER — OFFICE VISIT (OUTPATIENT)
Age: 69
End: 2024-06-21
Payer: MEDICARE

## 2024-06-21 VITALS
HEIGHT: 69 IN | WEIGHT: 229 LBS | DIASTOLIC BLOOD PRESSURE: 69 MMHG | OXYGEN SATURATION: 95 % | BODY MASS INDEX: 33.92 KG/M2 | SYSTOLIC BLOOD PRESSURE: 113 MMHG | HEART RATE: 86 BPM

## 2024-06-21 DIAGNOSIS — I48.0 PAROXYSMAL ATRIAL FIBRILLATION (HCC): Primary | ICD-10-CM

## 2024-06-21 DIAGNOSIS — Z95.1 PRESENCE OF AORTOCORONARY BYPASS GRAFT: ICD-10-CM

## 2024-06-21 DIAGNOSIS — Z09 HOSPITAL DISCHARGE FOLLOW-UP: ICD-10-CM

## 2024-06-21 DIAGNOSIS — I25.10 ATHEROSCLEROSIS OF NATIVE CORONARY ARTERY OF NATIVE HEART WITHOUT ANGINA PECTORIS: ICD-10-CM

## 2024-06-21 PROCEDURE — 1123F ACP DISCUSS/DSCN MKR DOCD: CPT | Performed by: NURSE PRACTITIONER

## 2024-06-21 PROCEDURE — 99214 OFFICE O/P EST MOD 30 MIN: CPT | Performed by: NURSE PRACTITIONER

## 2024-06-21 ASSESSMENT — PATIENT HEALTH QUESTIONNAIRE - PHQ9
1. LITTLE INTEREST OR PLEASURE IN DOING THINGS: NOT AT ALL
2. FEELING DOWN, DEPRESSED OR HOPELESS: NOT AT ALL
SUM OF ALL RESPONSES TO PHQ9 QUESTIONS 1 & 2: 0
SUM OF ALL RESPONSES TO PHQ QUESTIONS 1-9: 0
DEPRESSION UNABLE TO ASSESS: FUNCTIONAL CAPACITY MOTIVATION LIMITS ACCURACY
SUM OF ALL RESPONSES TO PHQ QUESTIONS 1-9: 0

## 2024-06-21 NOTE — PROGRESS NOTES
1. Have you been to the ER, urgent care clinic since your last visit?  Hospitalized since your last visit?     No    2. Have you seen or consulted any other health care providers outside of the [facility] since your last visit?  Include any pap smears or colon screening.      No      
MD Erick   nitroGLYCERIN (NITROSTAT) 0.4 MG SL tablet Place 1 tablet under the tongue every 5 minutes as needed for Chest pain up to max of 3 total doses. If no relief after 1 dose, call 911. 8/7/23  Yes Yuliya Lares APRN - IRAJ   aspirin 81 MG EC tablet Take 1 tablet by mouth daily   Yes Automatic Reconciliation, Ar   coenzyme Q10 100 MG CAPS capsule Take 1 capsule by mouth daily   Yes Automatic Reconciliation, Ar   latanoprost (XALATAN) 0.005 % ophthalmic solution Place 1 drop into both eyes nightly   Yes Automatic Reconciliation, Ar         /69 (Site: Left Upper Arm, Position: Sitting, Cuff Size: Large Adult)   Pulse 86   Ht 1.753 m (5' 9\")   Wt 103.9 kg (229 lb)   SpO2 95%   BMI 33.82 kg/m²     Physical Exam  Constitutional:       Appearance: He is well-developed.   HENT:      Head: Normocephalic and atraumatic.   Eyes:      Conjunctiva/sclera: Conjunctivae normal.   Neck:      Thyroid: No thyromegaly.      Vascular: No JVD.      Trachea: No tracheal deviation.   Cardiovascular:      Rate and Rhythm: Normal rate and regular rhythm.      Heart sounds: Normal heart sounds. No murmur heard.    No friction rub. No gallop.   Pulmonary:      Effort: No respiratory distress.      Breath sounds: Normal breath sounds. No wheezing or rales.   Chest:      Chest wall: No tenderness.   Abdominal:      Palpations: Abdomen is soft.      Tenderness: There is no abdominal tenderness.   Musculoskeletal:      Cervical back: Neck supple.   Skin:     General: Skin is warm and dry. Bruising noted to right groin, no hematoma noted.  Neurological:      Mental Status: He is alert and oriented to person, place, and time.       Mr. Chu has a reminder for a \"due or due soon\" health maintenance. I have asked that he contact his primary care provider for follow-up on this health maintenance.    No flowsheet data found.  I have personally reviewed patient's records available from hospital and other providers and

## 2024-06-22 DIAGNOSIS — E78.5 HYPERLIPIDEMIA, UNSPECIFIED HYPERLIPIDEMIA TYPE: ICD-10-CM

## 2024-06-24 RX ORDER — LISINOPRIL 5 MG/1
5 TABLET ORAL DAILY
Qty: 100 TABLET | Refills: 2 | Status: SHIPPED | OUTPATIENT
Start: 2024-06-24

## 2024-07-18 ENCOUNTER — OFFICE VISIT (OUTPATIENT)
Facility: CLINIC | Age: 69
End: 2024-07-18
Payer: MEDICARE

## 2024-07-18 VITALS
TEMPERATURE: 97.8 F | SYSTOLIC BLOOD PRESSURE: 116 MMHG | HEART RATE: 90 BPM | DIASTOLIC BLOOD PRESSURE: 75 MMHG | RESPIRATION RATE: 16 BRPM | BODY MASS INDEX: 33.74 KG/M2 | OXYGEN SATURATION: 95 % | WEIGHT: 227.8 LBS | HEIGHT: 69 IN

## 2024-07-18 DIAGNOSIS — E11.65 TYPE 2 DIABETES MELLITUS WITH HYPERGLYCEMIA, WITHOUT LONG-TERM CURRENT USE OF INSULIN (HCC): Primary | ICD-10-CM

## 2024-07-18 DIAGNOSIS — E66.9 OBESITY (BMI 30-39.9): ICD-10-CM

## 2024-07-18 DIAGNOSIS — I25.708 ATHEROSCLEROSIS OF CORONARY ARTERY BYPASS GRAFT(S), UNSPECIFIED, WITH OTHER FORMS OF ANGINA PECTORIS (HCC): ICD-10-CM

## 2024-07-18 DIAGNOSIS — R39.9 LOWER URINARY TRACT SYMPTOMS (LUTS): ICD-10-CM

## 2024-07-18 DIAGNOSIS — I10 ESSENTIAL (PRIMARY) HYPERTENSION: ICD-10-CM

## 2024-07-18 DIAGNOSIS — E78.5 HYPERLIPIDEMIA, UNSPECIFIED HYPERLIPIDEMIA TYPE: ICD-10-CM

## 2024-07-18 PROCEDURE — 99214 OFFICE O/P EST MOD 30 MIN: CPT | Performed by: FAMILY MEDICINE

## 2024-07-18 PROCEDURE — 3078F DIAST BP <80 MM HG: CPT | Performed by: FAMILY MEDICINE

## 2024-07-18 PROCEDURE — 3074F SYST BP LT 130 MM HG: CPT | Performed by: FAMILY MEDICINE

## 2024-07-18 PROCEDURE — 1123F ACP DISCUSS/DSCN MKR DOCD: CPT | Performed by: FAMILY MEDICINE

## 2024-07-18 SDOH — ECONOMIC STABILITY: FOOD INSECURITY: WITHIN THE PAST 12 MONTHS, YOU WORRIED THAT YOUR FOOD WOULD RUN OUT BEFORE YOU GOT MONEY TO BUY MORE.: NEVER TRUE

## 2024-07-18 SDOH — ECONOMIC STABILITY: FOOD INSECURITY: WITHIN THE PAST 12 MONTHS, THE FOOD YOU BOUGHT JUST DIDN'T LAST AND YOU DIDN'T HAVE MONEY TO GET MORE.: NEVER TRUE

## 2024-07-18 SDOH — ECONOMIC STABILITY: INCOME INSECURITY: HOW HARD IS IT FOR YOU TO PAY FOR THE VERY BASICS LIKE FOOD, HOUSING, MEDICAL CARE, AND HEATING?: NOT HARD AT ALL

## 2024-07-18 ASSESSMENT — PATIENT HEALTH QUESTIONNAIRE - PHQ9
1. LITTLE INTEREST OR PLEASURE IN DOING THINGS: NOT AT ALL
SUM OF ALL RESPONSES TO PHQ QUESTIONS 1-9: 0
SUM OF ALL RESPONSES TO PHQ9 QUESTIONS 1 & 2: 0
2. FEELING DOWN, DEPRESSED OR HOPELESS: NOT AT ALL
SUM OF ALL RESPONSES TO PHQ QUESTIONS 1-9: 0

## 2024-07-18 NOTE — PROGRESS NOTES
\"Have you been to the ER, urgent care clinic since your last visit?  Hospitalized since your last visit?\"    NO    “Have you seen or consulted any other health care providers [facility]?”    NO            Click Here for Release of Records Request

## 2024-07-18 NOTE — PROGRESS NOTES
[facility]  Fab Chu comes in for follow up care.  CAD: Patient has coronary artery disease.  Denies chest pain, shortness of breath or diaphoresis.  He has had CABG done in the past.  He is on Plavix, lisinopril, Crestor, metoprolol, Imdur, aspirin, WelChol and he has nitroglycerin to use as needed.  Will continue current treatment plan.  He is followed up with a cardiologist.   HTN: Patient has hypertension.  Blood pressure is stable.  Denies headache, changes in vision or focal weakness.  Patient is on metoprolol, Imdur, lisinopril.  He will continue with the current treatment plan.  He will take a low-sodium diet.  DM2: Patient has type 2 diabetes mellitus.  Patient is on metformin.  HbA1c 7.3.  He will intensify lifestyle and dietary modification.  Dyslipidemia: Patient has dyslipidemia.  He is on Crestor, WelChol and he is taking a diet low in polysaturated fats.  Will continue with the current management plan.  LUTS: Patient has lower urinary tract symptoms.  He has post micturition dribbling and poor urinary stream.  He is on Flomax and Proscar.  Continue current treatment plan.  Behavioral health: Patient was recently bereaved.  Wife  in May.  He is stable and does not want any medication.  States that he does not feel depressed at the moment.  He is with his family and doing a lot of supportive care.  He will continue current management plan.  Obesity: Patient has a BMI of 33.64.  He will intensify lifestyle and dietary modification.    Past Medical History  Past Medical History:   Diagnosis Date    Bypass graft mechanical complication (HCC)     quadropple    Diabetes (HCC)     Glaucoma     History of staph infection     knee    HTN (hypertension)     Hyperlipemia     SOB (shortness of breath)        Surgical History  Past Surgical History:   Procedure Laterality Date    CARDIAC PROCEDURE N/A 2023    Left heart cath performed by Jomar BRAVO MD at [facility] CARDIAC CATH LAB    CARDIAC PROCEDURE N/A

## 2024-07-19 LAB
A/G RATIO: 1.5 RATIO (ref 1.1–2.6)
ALBUMIN: 4.3 G/DL (ref 3.5–5)
ALP BLD-CCNC: 60 U/L (ref 40–125)
ALT SERPL-CCNC: 40 U/L (ref 5–40)
ANION GAP SERPL CALCULATED.3IONS-SCNC: 11 MMOL/L (ref 3–15)
AST SERPL-CCNC: 27 U/L (ref 10–37)
BASOPHILS ABSOLUTE: 0 K/UL (ref 0–0.2)
BASOPHILS RELATIVE PERCENT: 0 % (ref 0–2)
BILIRUB SERPL-MCNC: 0.6 MG/DL (ref 0.2–1.2)
BUN BLDV-MCNC: 16 MG/DL (ref 6–22)
CALCIUM SERPL-MCNC: 9.2 MG/DL (ref 8.4–10.5)
CHLORIDE BLD-SCNC: 104 MMOL/L (ref 98–110)
CHOLESTEROL, TOTAL: 149 MG/DL (ref 110–200)
CHOLESTEROL/HDL RATIO: 3.8 (ref 0–5)
CO2: 25 MMOL/L (ref 20–32)
CREAT SERPL-MCNC: 0.8 MG/DL (ref 0.8–1.6)
EOSINOPHIL # BLD: 1 % (ref 0–6)
EOSINOPHILS ABSOLUTE: 0.1 K/UL (ref 0–0.5)
GFR, ESTIMATED: >60 ML/MIN/1.73 SQ.M.
GLOBULIN: 2.8 G/DL (ref 2–4)
GLUCOSE: 147 MG/DL (ref 70–99)
HCT VFR BLD CALC: 43.7 % (ref 37.8–52.2)
HDLC SERPL-MCNC: 39 MG/DL
HEMOGLOBIN: 14.9 G/DL (ref 12.6–17.1)
LDL CHOLESTEROL: 81 MG/DL (ref 50–99)
LDL/HDL RATIO: 2.1
LYMPHOCYTES # BLD: 26 % (ref 20–45)
LYMPHOCYTES ABSOLUTE: 1.6 K/UL (ref 1–4.8)
MCH RBC QN AUTO: 33 PG (ref 26–34)
MCHC RBC AUTO-ENTMCNC: 34 G/DL (ref 31–36)
MCV RBC AUTO: 98 FL (ref 80–95)
MONOCYTES ABSOLUTE: 0.6 K/UL (ref 0.1–1)
MONOCYTES: 10 % (ref 3–12)
NEUTROPHILS ABSOLUTE: 4 K/UL (ref 1.8–7.7)
NEUTROPHILS: 63 % (ref 40–75)
NON-HDL CHOLESTEROL: 110 MG/DL
PDW BLD-RTO: 12.3 % (ref 10–15.5)
PLATELET # BLD: 201 K/UL (ref 140–440)
PMV BLD AUTO: 8.6 FL (ref 9–13)
POTASSIUM SERPL-SCNC: 4.2 MMOL/L (ref 3.5–5.5)
RBC # BLD: 4.46 M/UL (ref 3.8–5.8)
SODIUM BLD-SCNC: 140 MMOL/L (ref 133–145)
TOTAL PROTEIN: 7.1 G/DL (ref 6.2–8.1)
TRIGL SERPL-MCNC: 145 MG/DL (ref 40–149)
VLDLC SERPL CALC-MCNC: 29 MG/DL (ref 8–30)
WBC # BLD: 6.3 K/UL (ref 4–11)

## 2024-07-24 RX ORDER — COLESEVELAM 180 1/1
1250 TABLET ORAL DAILY
Qty: 200 TABLET | Refills: 2 | Status: SHIPPED | OUTPATIENT
Start: 2024-07-24

## 2024-10-07 DIAGNOSIS — I48.0 PAROXYSMAL ATRIAL FIBRILLATION (HCC): Primary | ICD-10-CM

## 2024-10-07 RX ORDER — ISOSORBIDE MONONITRATE 30 MG/1
30 TABLET, EXTENDED RELEASE ORAL DAILY
Qty: 100 TABLET | Refills: 3 | Status: SHIPPED | OUTPATIENT
Start: 2024-10-07

## 2024-12-04 SDOH — HEALTH STABILITY: PHYSICAL HEALTH: ON AVERAGE, HOW MANY DAYS PER WEEK DO YOU ENGAGE IN MODERATE TO STRENUOUS EXERCISE (LIKE A BRISK WALK)?: 0 DAYS

## 2024-12-04 ASSESSMENT — PATIENT HEALTH QUESTIONNAIRE - PHQ9
1. LITTLE INTEREST OR PLEASURE IN DOING THINGS: NOT AT ALL
SUM OF ALL RESPONSES TO PHQ QUESTIONS 1-9: 0
2. FEELING DOWN, DEPRESSED OR HOPELESS: NOT AT ALL
SUM OF ALL RESPONSES TO PHQ QUESTIONS 1-9: 0
SUM OF ALL RESPONSES TO PHQ9 QUESTIONS 1 & 2: 0
SUM OF ALL RESPONSES TO PHQ QUESTIONS 1-9: 0
SUM OF ALL RESPONSES TO PHQ QUESTIONS 1-9: 0

## 2024-12-04 ASSESSMENT — LIFESTYLE VARIABLES
HOW OFTEN DO YOU HAVE A DRINK CONTAINING ALCOHOL: 2
HOW OFTEN DO YOU HAVE A DRINK CONTAINING ALCOHOL: MONTHLY OR LESS
HOW MANY STANDARD DRINKS CONTAINING ALCOHOL DO YOU HAVE ON A TYPICAL DAY: 1
HOW OFTEN DO YOU HAVE SIX OR MORE DRINKS ON ONE OCCASION: 1
HOW MANY STANDARD DRINKS CONTAINING ALCOHOL DO YOU HAVE ON A TYPICAL DAY: 1 OR 2

## 2024-12-05 ENCOUNTER — OFFICE VISIT (OUTPATIENT)
Facility: CLINIC | Age: 69
End: 2024-12-05

## 2024-12-05 VITALS
WEIGHT: 231 LBS | OXYGEN SATURATION: 96 % | TEMPERATURE: 97.6 F | SYSTOLIC BLOOD PRESSURE: 127 MMHG | RESPIRATION RATE: 20 BRPM | HEIGHT: 69 IN | HEART RATE: 80 BPM | BODY MASS INDEX: 34.21 KG/M2 | DIASTOLIC BLOOD PRESSURE: 73 MMHG

## 2024-12-05 DIAGNOSIS — E07.9 THYROID DISORDER: ICD-10-CM

## 2024-12-05 DIAGNOSIS — E55.9 HYPOVITAMINOSIS D: ICD-10-CM

## 2024-12-05 DIAGNOSIS — E11.65 TYPE 2 DIABETES MELLITUS WITH HYPERGLYCEMIA, WITHOUT LONG-TERM CURRENT USE OF INSULIN (HCC): ICD-10-CM

## 2024-12-05 DIAGNOSIS — Z00.00 MEDICARE ANNUAL WELLNESS VISIT, SUBSEQUENT: Primary | ICD-10-CM

## 2024-12-05 DIAGNOSIS — R06.02 SOB (SHORTNESS OF BREATH): ICD-10-CM

## 2024-12-05 DIAGNOSIS — R39.9 LOWER URINARY TRACT SYMPTOMS (LUTS): ICD-10-CM

## 2024-12-05 DIAGNOSIS — R53.83 MALAISE AND FATIGUE: ICD-10-CM

## 2024-12-05 DIAGNOSIS — I10 ESSENTIAL (PRIMARY) HYPERTENSION: ICD-10-CM

## 2024-12-05 DIAGNOSIS — I25.708 ATHEROSCLEROSIS OF CORONARY ARTERY BYPASS GRAFT(S), UNSPECIFIED, WITH OTHER FORMS OF ANGINA PECTORIS (HCC): ICD-10-CM

## 2024-12-05 DIAGNOSIS — M79.10 MYALGIA: ICD-10-CM

## 2024-12-05 DIAGNOSIS — Z23 FLU VACCINE NEED: ICD-10-CM

## 2024-12-05 DIAGNOSIS — E78.5 HYPERLIPIDEMIA, UNSPECIFIED HYPERLIPIDEMIA TYPE: ICD-10-CM

## 2024-12-05 DIAGNOSIS — R53.81 MALAISE AND FATIGUE: ICD-10-CM

## 2024-12-05 DIAGNOSIS — Z12.5 SCREENING FOR MALIGNANT NEOPLASM OF PROSTATE: ICD-10-CM

## 2024-12-05 LAB — HBA1C MFR BLD: 8.2 %

## 2024-12-05 ASSESSMENT — PATIENT HEALTH QUESTIONNAIRE - PHQ9
SUM OF ALL RESPONSES TO PHQ QUESTIONS 1-9: 0
SUM OF ALL RESPONSES TO PHQ9 QUESTIONS 1 & 2: 0
1. LITTLE INTEREST OR PLEASURE IN DOING THINGS: NOT AT ALL
2. FEELING DOWN, DEPRESSED OR HOPELESS: NOT AT ALL

## 2024-12-05 NOTE — PATIENT INSTRUCTIONS
life.  Balancing.  This helps you stay coordinated and have good posture.  Includes heel-to-toe walking, ramez chi, and certain types of yoga.  Aim for at least 3 days a week.  It can reduce your risk of falling.  Even if you have a hard time meeting the recommendations, it's better to be more active than less active. All activity done in each category counts toward your weekly total. You'd be surprised how daily things like carrying groceries, keeping up with grandchildren, and taking the stairs can add up.  What keeps you from being active?  If you've had a hard time being more active, you're not alone. Maybe you remember being able to do more. Or maybe you've never thought of yourself as being active. It's frustrating when you can't do the things you want. Being more active can help. What's holding you back?  Getting started.  Have a goal, but break it into easy tasks. Small steps build into big accomplishments.  Staying motivated.  If you feel like skipping your activity, remember your goal. Maybe you want to move better and stay independent. Every activity gets you one step closer.  Not feeling your best.  Start with 5 minutes of an activity you enjoy. Prove to yourself you can do it. As you get comfortable, increase your time.  You may not be where you want to be. But you're in the process of getting there. Everyone starts somewhere.  How can you find safe ways to stay active?  Talk with your doctor about any physical challenges you're facing. Make a plan with your doctor if you have a health problem or aren't sure how to get started with activity.  If you're already active, ask your doctor if there is anything you should change to stay safe as your body and health change.  If you tend to feel dizzy after you take medicine, avoid activity at that time. Try being active before you take your medicine. This will reduce your risk of falls.  If you plan to be active at home, make sure to clear your space before you

## 2024-12-05 NOTE — PROGRESS NOTES
Medicare Annual Wellness Visit    Fab Chu is here for Medicare AWV, Follow-up Chronic Condition, and Numbness (Both hands)    Assessment & Plan    Diagnosis Orders   1. Medicare annual wellness visit, subsequent        2. Type 2 diabetes mellitus with hyperglycemia, without long-term current use of insulin (HCC)  AMB POC HEMOGLOBIN A1C    CBC with Auto Differential    Comprehensive Metabolic Panel      3. Flu vaccine need  Influenza, FLUAD Trivalent, (age 65 y+), IM, Preservative Free, 0.5mL      4. Myalgia  CK      5. Malaise and fatigue        6. SOB (shortness of breath)        7. Hyperlipidemia, unspecified hyperlipidemia type  Lipid Panel      8. Atherosclerosis of coronary artery bypass graft(s), unspecified, with other forms of angina pectoris (HCC)        9. Essential (primary) hypertension        10. Lower urinary tract symptoms (LUTS)        11. Thyroid disorder  TSH + Free T4 Panel      12. Hypovitaminosis D  Vitamin D 25 Hydroxy      13. Screening for malignant neoplasm of prostate  PSA Screening        Recommendations for Preventive Services Due: see orders and patient instructions/AVS.  Recommended screening schedule for the next 5-10 years is provided to the patient in written form: see Patient Instructions/AVS.     Return in about 3 months (around 3/5/2025), or if symptoms worsen or fail to improve, for Diabetes Mellitus, Hypertension.     Subjective   Fab Chu comes in for Medicare wellness exam.    Patient's complete Health Risk Assessment and screening values have been reviewed and are found in Flowsheets. The following problems were reviewed today and where indicated follow up appointments were made and/or referrals ordered.    Positive Risk Factor Screenings with Interventions:             General HRA Questions:  Select all that apply: (!) New or Increased Fatigue  Interventions Fatigue:  Patient advised to follow up in the office for further evaluation and 
\"Have you been to the ER, urgent care clinic since your last visit?  Hospitalized since your last visit?\"    NO    “Have you seen or consulted any other health care providers outside our system since your last visit?”    NO      “Have you had a diabetic eye exam?”    NO     Date of last diabetic eye exam: 6/22/2023          
Unable to Pay for Housing in the Last Year: Not on file     Number of Places Lived in the Last Year: Not on file     Unstable Housing in the Last Year: No       Review of Systems  Review of Systems - Review of all systems is negative except as noted above in the HPI.    Vital Signs  /73   Pulse 80   Temp 97.6 °F (36.4 °C)   Resp 20   Ht 1.753 m (5' 9\")   Wt 104.8 kg (231 lb)   SpO2 96%   BMI 34.11 kg/m²       Physical Exam  Physical Examination: General appearance - oriented to person, place, and time, overweight, and acyanotic, in no respiratory distress  Mental status - alert, oriented to person, place, and time, affect appropriate to mood  Neck - supple, no significant adenopathy  Lymphatics - no palpable lymphadenopathy, no hepatosplenomegaly  Chest - clear to auscultation, no wheezes, rales or rhonchi, symmetric air entry  Heart - S1 and S2 normal  Abdomen - no rebound tenderness noted  Back exam - limited range of motion  Neurological - abnormal neurological exam unchanged from prior examinations  Musculoskeletal - osteoarthritic changes noted in both hands  Extremities - intact peripheral pulses      Results  Results for orders placed or performed in visit on 12/05/24   AMB POC HEMOGLOBIN A1C   Result Value Ref Range    Hemoglobin A1C, POC 8.2 %       ASSESSMENT and PLAN    ICD-10-CM    1. Type 2 diabetes mellitus with hyperglycemia, without long-term current use of insulin (HCC)  E11.65 AMB POC HEMOGLOBIN A1C     CBC with Auto Differential     Comprehensive Metabolic Panel      2. Flu vaccine need  Z23 Influenza, FLUAD Trivalent, (age 65 y+), IM, Preservative Free, 0.5mL      3. Myalgia  M79.10 CK      4. Malaise and fatigue  R53.81     R53.83       5. SOB (shortness of breath)  R06.02       6. Hyperlipidemia, unspecified hyperlipidemia type  E78.5 Lipid Panel      7. Atherosclerosis of coronary artery bypass graft(s), unspecified, with other forms of angina pectoris (HCC)  I25.708       8.

## 2024-12-06 LAB
A/G RATIO: 1.4 RATIO (ref 1.1–2.6)
ALBUMIN: 4.2 G/DL (ref 3.5–5)
ALP BLD-CCNC: 58 U/L (ref 40–125)
ALT SERPL-CCNC: 57 U/L (ref 5–40)
ANION GAP SERPL CALCULATED.3IONS-SCNC: 13 MMOL/L (ref 3–15)
AST SERPL-CCNC: 42 U/L (ref 10–37)
BASOPHILS ABSOLUTE: 0 K/UL (ref 0–0.2)
BASOPHILS RELATIVE PERCENT: 1 % (ref 0–2)
BILIRUB SERPL-MCNC: 0.5 MG/DL (ref 0.2–1.2)
BUN BLDV-MCNC: 16 MG/DL (ref 6–22)
CALCIUM SERPL-MCNC: 9.4 MG/DL (ref 8.4–10.5)
CHLORIDE BLD-SCNC: 100 MMOL/L (ref 98–110)
CHOLESTEROL, TOTAL: 144 MG/DL (ref 110–200)
CHOLESTEROL/HDL RATIO: 3.7 (ref 0–5)
CO2: 22 MMOL/L (ref 20–32)
CREAT SERPL-MCNC: 0.9 MG/DL (ref 0.8–1.6)
EOSINOPHIL # BLD: 2 % (ref 0–6)
EOSINOPHILS ABSOLUTE: 0.2 K/UL (ref 0–0.5)
GFR, ESTIMATED: >60 ML/MIN/1.73 SQ.M.
GLOBULIN: 2.9 G/DL (ref 2–4)
GLUCOSE: 182 MG/DL (ref 70–99)
HCT VFR BLD CALC: 44.6 % (ref 37.8–52.2)
HDLC SERPL-MCNC: 39 MG/DL
HEMOGLOBIN: 15.5 G/DL (ref 12.6–17.1)
LDL CHOLESTEROL: 78 MG/DL (ref 50–99)
LDL/HDL RATIO: 2
LYMPHOCYTES # BLD: 21 % (ref 20–45)
LYMPHOCYTES ABSOLUTE: 1.3 K/UL (ref 1–4.8)
MCH RBC QN AUTO: 33 PG (ref 26–34)
MCHC RBC AUTO-ENTMCNC: 35 G/DL (ref 31–36)
MCV RBC AUTO: 96 FL (ref 80–95)
MONOCYTES ABSOLUTE: 0.7 K/UL (ref 0.1–1)
MONOCYTES: 11 % (ref 3–12)
NEUTROPHILS ABSOLUTE: 4.1 K/UL (ref 1.8–7.7)
NEUTROPHILS: 65 % (ref 40–75)
NON-HDL CHOLESTEROL: 105 MG/DL
PDW BLD-RTO: 12.2 % (ref 10–15.5)
PLATELET # BLD: 225 K/UL (ref 140–440)
PMV BLD AUTO: 8.9 FL (ref 9–13)
POTASSIUM SERPL-SCNC: 4.2 MMOL/L (ref 3.5–5.5)
PROSTATE SPECIFIC ANTIGEN: 1.03 NG/ML
RBC # BLD: 4.65 M/UL (ref 3.8–5.8)
SODIUM BLD-SCNC: 135 MMOL/L (ref 133–145)
T4 FREE: 1.2 NG/DL (ref 0.9–1.8)
TOTAL CK: 201 U/L (ref 30–200)
TOTAL PROTEIN: 7.1 G/DL (ref 6.2–8.1)
TRIGL SERPL-MCNC: 134 MG/DL (ref 40–149)
TSH SERPL DL<=0.05 MIU/L-ACNC: 1.88 MCU/ML (ref 0.27–4.2)
VITAMIN D 25-HYDROXY: 35.5 NG/ML (ref 32–100)
VLDLC SERPL CALC-MCNC: 27 MG/DL (ref 8–30)
WBC # BLD: 6.2 K/UL (ref 4–11)

## 2024-12-12 DIAGNOSIS — R74.8 ELEVATED LIVER ENZYMES: Primary | ICD-10-CM

## 2024-12-20 ENCOUNTER — OFFICE VISIT (OUTPATIENT)
Age: 69
End: 2024-12-20
Payer: MEDICARE

## 2024-12-20 VITALS
WEIGHT: 230 LBS | DIASTOLIC BLOOD PRESSURE: 85 MMHG | HEART RATE: 81 BPM | HEIGHT: 69 IN | OXYGEN SATURATION: 98 % | BODY MASS INDEX: 34.07 KG/M2 | SYSTOLIC BLOOD PRESSURE: 137 MMHG

## 2024-12-20 DIAGNOSIS — R07.9 CHEST PAIN, UNSPECIFIED TYPE: Primary | ICD-10-CM

## 2024-12-20 DIAGNOSIS — E78.2 MIXED HYPERLIPIDEMIA: ICD-10-CM

## 2024-12-20 DIAGNOSIS — Z95.1 PRESENCE OF AORTOCORONARY BYPASS GRAFT: ICD-10-CM

## 2024-12-20 DIAGNOSIS — I48.0 PAROXYSMAL ATRIAL FIBRILLATION (HCC): ICD-10-CM

## 2024-12-20 DIAGNOSIS — Z95.5 HISTORY OF CORONARY ANGIOPLASTY WITH INSERTION OF STENT: ICD-10-CM

## 2024-12-20 DIAGNOSIS — I25.10 ATHEROSCLEROSIS OF NATIVE CORONARY ARTERY OF NATIVE HEART WITHOUT ANGINA PECTORIS: ICD-10-CM

## 2024-12-20 PROCEDURE — 99214 OFFICE O/P EST MOD 30 MIN: CPT | Performed by: NURSE PRACTITIONER

## 2024-12-20 PROCEDURE — 1123F ACP DISCUSS/DSCN MKR DOCD: CPT | Performed by: NURSE PRACTITIONER

## 2024-12-20 ASSESSMENT — PATIENT HEALTH QUESTIONNAIRE - PHQ9
SUM OF ALL RESPONSES TO PHQ QUESTIONS 1-9: 0
SUM OF ALL RESPONSES TO PHQ QUESTIONS 1-9: 0
1. LITTLE INTEREST OR PLEASURE IN DOING THINGS: NOT AT ALL
2. FEELING DOWN, DEPRESSED OR HOPELESS: NOT AT ALL
SUM OF ALL RESPONSES TO PHQ QUESTIONS 1-9: 0
SUM OF ALL RESPONSES TO PHQ9 QUESTIONS 1 & 2: 0
SUM OF ALL RESPONSES TO PHQ QUESTIONS 1-9: 0

## 2024-12-20 NOTE — PROGRESS NOTES
HISTORY OF PRESENT ILLNESS  Fab Chu is a 67 y.o. male.    2/12/2021  Patient seen today for new patient evaluation he has history of CAD, paroxysmal A. fib prior CABG and hyperlipidemia.  Patient is recovering of increasing shortness of breath.  Exertional shortness of breath is persistent since CABG    Patient has increased shortness of breath on exertion happens with climbing stairs his activity.  Occasional chest pain which are short lasting left-sided not related activity or exertion.  Denies orthopnea PND no peripheral edema.  Patient had prior PCI's and subsequent CABG in December 2019.  He had a complex course requiring reopening of the chest.  Postoperatively he had atrial fibrillation with no recurrence after that.  His anticoagulation has been discontinued since then.    9/2021  Patient is here for follow-up.  Patient has again noticed increasing shortness of breath.  Happens on minimal activity exertion.  Also complain of left-sided dull pain not related to activity exertion lasting less than a minute at rest.    6/2023  Patient seen in follow-up for testing results.  He complains of mild dyspnea on exertion denies chest pain jaw pain palpitations or edema.  8/7/2023  Patient seen in follow up for CAD.  He c/o mild dyspnea on exertion, he has had episod  8/15/2023  Patient seen s/p cardiac cath with stent to ostial / prox RCA.  Patient reports decrease in shortness of breath.  He has mild bruising to groin area.  Denies chest pain, palpitations or edema.  6/21/2024  Patient with h/o CAD seen in f/u.  Denies further episodes of chest pain.  Denies shortness of breath, palpitations or edema.    12/20/2024  Patient with h/o CAD s/p CABG.  C/o increased myalgias to arms and legs, also with increased shortness of breath.  Recent elevated LFT - has upcoming liver u/s. Denies chest pain or edema.    Follow-up  Associated symptoms include shortness of breath. Pertinent negatives include no chest pain, no

## 2024-12-20 NOTE — PROGRESS NOTES
1. Have you been to the ER, urgent care clinic since your last visit?  Hospitalized since your last visit?     no    2. Have you seen or consulted any other health care providers outside of the [facility] since your last visit?  Include any pap smears or colon screening.      Yes pcp

## 2025-01-03 LAB
A/G RATIO: 1.2 RATIO (ref 1.1–2.6)
ALBUMIN: 3.8 G/DL (ref 3.5–5)
ALP BLD-CCNC: 71 U/L (ref 40–125)
ALT SERPL-CCNC: 50 U/L (ref 5–40)
AST SERPL-CCNC: 37 U/L (ref 10–37)
BILIRUB SERPL-MCNC: 0.2 MG/DL (ref 0.2–1.2)
BILIRUBIN DIRECT: <0.2 MG/DL (ref 0–0.3)
GLOBULIN: 3.3 G/DL (ref 2–4)
TOTAL PROTEIN: 7.1 G/DL (ref 6.2–8.1)

## 2025-01-06 ENCOUNTER — TELEPHONE (OUTPATIENT)
Age: 70
End: 2025-01-06

## 2025-01-06 NOTE — TELEPHONE ENCOUNTER
Spoke to patient per Yuliya Lares regarding lab/test. Lab reviewed. Please call patient, advise labs are normal. LFT mildly elevated, improved  Follow up as scheduled. He voices understanding and acceptance of this advice and will call back if any further questions or concerns.

## 2025-01-06 NOTE — TELEPHONE ENCOUNTER
----- Message from MITZI Trammell NP sent at 1/6/2025  3:39 PM EST -----  Please call patient, advise labs are normal. LFT mildly elevated, improved  Follow up as scheduled

## 2025-01-12 DIAGNOSIS — E11.65 TYPE 2 DIABETES MELLITUS WITH HYPERGLYCEMIA, WITHOUT LONG-TERM CURRENT USE OF INSULIN (HCC): ICD-10-CM

## 2025-01-24 ENCOUNTER — OFFICE VISIT (OUTPATIENT)
Age: 70
End: 2025-01-24
Payer: MEDICARE

## 2025-01-24 VITALS
SYSTOLIC BLOOD PRESSURE: 137 MMHG | HEART RATE: 96 BPM | DIASTOLIC BLOOD PRESSURE: 75 MMHG | BODY MASS INDEX: 34.07 KG/M2 | WEIGHT: 230 LBS | HEIGHT: 69 IN | OXYGEN SATURATION: 97 %

## 2025-01-24 DIAGNOSIS — Z95.5 HISTORY OF CORONARY ANGIOPLASTY WITH INSERTION OF STENT: ICD-10-CM

## 2025-01-24 DIAGNOSIS — I25.10 ATHEROSCLEROSIS OF NATIVE CORONARY ARTERY OF NATIVE HEART WITHOUT ANGINA PECTORIS: ICD-10-CM

## 2025-01-24 DIAGNOSIS — I48.0 PAROXYSMAL ATRIAL FIBRILLATION (HCC): Primary | ICD-10-CM

## 2025-01-24 DIAGNOSIS — E78.2 MIXED HYPERLIPIDEMIA: ICD-10-CM

## 2025-01-24 DIAGNOSIS — Z95.1 S/P CABG X 4: ICD-10-CM

## 2025-01-24 PROCEDURE — 99214 OFFICE O/P EST MOD 30 MIN: CPT | Performed by: NURSE PRACTITIONER

## 2025-01-24 PROCEDURE — 1123F ACP DISCUSS/DSCN MKR DOCD: CPT | Performed by: NURSE PRACTITIONER

## 2025-01-24 RX ORDER — RANOLAZINE 500 MG/1
500 TABLET, EXTENDED RELEASE ORAL 2 TIMES DAILY
Qty: 60 TABLET | Refills: 3 | Status: SHIPPED | OUTPATIENT
Start: 2025-01-24

## 2025-01-24 RX ORDER — NITROGLYCERIN 0.4 MG/1
0.4 TABLET SUBLINGUAL EVERY 5 MIN PRN
Qty: 25 TABLET | Refills: 3 | Status: SHIPPED | OUTPATIENT
Start: 2025-01-24

## 2025-01-24 NOTE — PROGRESS NOTES
1. Have you been to the ER, urgent care clinic since your last visit?  Hospitalized since your last visit?     no    2. Have you seen or consulted any other health care providers outside of the [facility] since your last visit?  Include any pap smears or colon screening.      no   
medical management monitor.  Continue DAPT.  Recent ER visit with atypical chest pain right-sided.  Had small pneumonia better after treatment.  Also has small aortic aneurysm continue monitoring.  Nuclear stress test in emergency room is negative.  Dizziness is improving.  5/3/2024  Patient seen for complaint of chest pain and was recently in emergency room for similar complaint.  Will obtain nuclear stress test likely will need repeat cardiac cath.  Will continue current medications.  Including aspirin sublingual nitroglycerin as needed patient under significant stress as wife with terminal diagnosis and going home with hospice.  Advised to return to the emergency room for new or worsening symptoms  6/21/2024  Cardiac status is stable denies further episodes of chest pain nuclear stress test reviewed and discussed with patient negative for ischemia.  Will continue current medications will consider cardiac cath for new or worsening symptoms  12/20/2024  C/O increased myalgias and shortness of breath.  Will trial statin holiday x 2 weeks and evaluate LFT and myalgias.  Will obtain echo to follow LV function.  Will continue current medications.  F/U post testing - to ER for new or worsening symptoms.   1/24/2025  Complains of left-sided chest pain with shortness of breath worse with exertion.  Echo with normal LV function.  History of CAD status post CABG with PCI.  Will optimize antianginals continue beta-blocker Imdur aspirin and Plavix will add Ranexa 500 mg twice daily.  Myalgias have resolved and LFTs improved since discontinuing rosuvastatin will begin Repatha injection every 14 days.  Will discussed with interventionalists if patient would benefit from repeat cardiac cath.

## 2025-01-27 ENCOUNTER — TELEPHONE (OUTPATIENT)
Age: 70
End: 2025-01-27

## 2025-01-27 DIAGNOSIS — Z95.1 S/P CABG X 4: Primary | ICD-10-CM

## 2025-01-27 DIAGNOSIS — I25.10 ATHEROSCLEROSIS OF NATIVE CORONARY ARTERY OF NATIVE HEART WITHOUT ANGINA PECTORIS: ICD-10-CM

## 2025-01-27 NOTE — TELEPHONE ENCOUNTER
----- Message from MITZI Trammell NP sent at 1/27/2025  9:31 AM EST -----  Please let MR. Chu know Dr. Brownlee would like to do cath  And order.  Thank you  ----- Message -----  From: Dylan Brownlee MD  Sent: 1/24/2025   5:23 PM EST  To: MITZI Wisdom NP    Yeah, looks good    Thanks  TV  ----- Message -----  From: Yuliya Lares APRN - NP  Sent: 1/24/2025   2:45 PM EST  To: Dylan Brownlee MD      Complains of left-sided chest pain with shortness of breath worse with exertion.  Echo with normal LV function.  History of CAD status post CABG with PCI.  Will optimize antianginals continue beta-blocker Imdur aspirin and Plavix will add Ranexa 500 mg twice daily.  Myalgias have resolved and LFTs improved since discontinuing rosuvastatin will begin Repatha injection every 14 days.  Will discussed with interventionalists if patient would benefit from repeat cardiac cath.

## 2025-01-27 NOTE — TELEPHONE ENCOUNTER
Spoke with patient per Yuliya Lares NP.  Please let  Kimberley know Dr. Brownlee would like to do cath  And order.  Once its approved by insurance, someone will call you with date/time. He voices understanding and acceptance of this advice and will call back if any further questions or concerns.

## 2025-01-28 ENCOUNTER — TELEPHONE (OUTPATIENT)
Age: 70
End: 2025-01-28

## 2025-01-28 NOTE — TELEPHONE ENCOUNTER
Authorization has been submitted and records have been faxed to [facility].

## 2025-02-06 NOTE — TELEPHONE ENCOUNTER
Procedure has been approved.    Authorization # [id number] 1/31 thru 3/17    Awaiting date from cath lab

## 2025-02-11 NOTE — FLOWSHEET NOTE
Called to verify arrival time of 0815 for 0915 procedure on 2/17. Pre-procedure instructions verified including NPO after midnight and which meds to take and/or hold. All questions answered, patient verbalized understanding.

## 2025-02-11 NOTE — TELEPHONE ENCOUNTER
[facility]      Left Heart Catheterization Instructions    You are scheduled to have a Left Heart Catheterization on 2/17/2025 at [facility]. Please arrive and check in at 8:00 a.m.     Please go to [facility] ([address] [place] [address]) and park in the outpatient parking lot that is located around to the back of the hospital ([facility]). You will enter through the [facility] entrance. Once you arrive, check in with the  at the [facility]  with the .     You are not to eat or drink anything after midnight the night before your procedure. Small sips of water with medications is OK.    If you are diabetic, do not take your insulin/sugar pill the morning of the procedure.     Medication Instructions:  Please Take your morning medications with the following special instructions:    [x] Please make sure to take your blood pressure medications with just enough water to swallow    [x]Take your Aspirin and or Plavix/Brilinta with just enough water to swallow    [] Hold (DO NOT TAKE) your [Eliquis, Xarelto, Coumadin, and or Metformin] on . Ask your nurse after the procedure when to resume these mediations.     []  If you have an allergy to Iodine, Contrast, or Shellfish: take Prednisone 60 mg and Benadryl 25 mg by mouth at at bed time the night before the procedure and again morning of the procedure.     6.  We encourage families to wait in the waiting room on the first floor while the procedure is being done. The Doctor will come out and talk with you as soon as the procedure is through. You will need someone to drive you home after you have been discharged from the hospital.     7.  There is a possibility you may spend the night in the hospital depending on the results of the procedure. This will be determined after the procedure is done.     8. If you or your family have any questions, please call our office

## 2025-02-11 NOTE — TELEPHONE ENCOUNTER
February 11, 2025 spoke with patient and he is aware of their Cath, date time, location and instructions. Was advised to call the office if he has any questions or concerns.

## 2025-02-17 ENCOUNTER — HOSPITAL ENCOUNTER (OUTPATIENT)
Facility: HOSPITAL | Age: 70
Setting detail: OUTPATIENT SURGERY
Discharge: HOME | End: 2025-02-17
Attending: INTERNAL MEDICINE | Admitting: INTERNAL MEDICINE
Payer: MEDICARE

## 2025-02-17 VITALS
WEIGHT: 230 LBS | HEART RATE: 77 BPM | SYSTOLIC BLOOD PRESSURE: 134 MMHG | DIASTOLIC BLOOD PRESSURE: 83 MMHG | BODY MASS INDEX: 34.07 KG/M2 | OXYGEN SATURATION: 95 % | HEIGHT: 69 IN | TEMPERATURE: 97.7 F | RESPIRATION RATE: 22 BRPM

## 2025-02-17 DIAGNOSIS — Z95.5 STENTED CORONARY ARTERY: Primary | ICD-10-CM

## 2025-02-17 DIAGNOSIS — I25.10 CORONARY ATHEROSCLEROSIS OF NATIVE CORONARY ARTERY: ICD-10-CM

## 2025-02-17 LAB
ANION GAP SERPL CALC-SCNC: 5 MMOL/L (ref 3–18)
BASOPHILS # BLD: 0.02 K/UL (ref 0–0.1)
BASOPHILS NFR BLD: 0.4 % (ref 0–2)
BUN SERPL-MCNC: 17 MG/DL (ref 7–18)
BUN/CREAT SERPL: 17 (ref 12–20)
CALCIUM SERPL-MCNC: 8.8 MG/DL (ref 8.5–10.1)
CHLORIDE SERPL-SCNC: 104 MMOL/L (ref 100–111)
CO2 SERPL-SCNC: 24 MMOL/L (ref 21–32)
CREAT SERPL-MCNC: 0.98 MG/DL (ref 0.6–1.3)
DIFFERENTIAL METHOD BLD: ABNORMAL
ECHO BSA: 2.25 M2
EKG ATRIAL RATE: 68 BPM
EKG DIAGNOSIS: NORMAL
EKG P AXIS: 38 DEGREES
EKG P-R INTERVAL: 180 MS
EKG Q-T INTERVAL: 404 MS
EKG QRS DURATION: 96 MS
EKG QTC CALCULATION (BAZETT): 429 MS
EKG R AXIS: 29 DEGREES
EKG T AXIS: 14 DEGREES
EKG VENTRICULAR RATE: 68 BPM
EOSINOPHIL # BLD: 0.16 K/UL (ref 0–0.4)
EOSINOPHIL NFR BLD: 2.8 % (ref 0–5)
ERYTHROCYTE [DISTWIDTH] IN BLOOD BY AUTOMATED COUNT: 12.3 % (ref 11.6–14.5)
GLUCOSE SERPL-MCNC: 221 MG/DL (ref 74–99)
HCT VFR BLD AUTO: 44.2 % (ref 36–48)
HGB BLD-MCNC: 15.2 G/DL (ref 13–16)
IMM GRANULOCYTES # BLD AUTO: 0.02 K/UL (ref 0–0.04)
IMM GRANULOCYTES NFR BLD AUTO: 0.4 % (ref 0–0.5)
INR PPP: 1 (ref 0.9–1.1)
LYMPHOCYTES # BLD: 1.46 K/UL (ref 0.9–3.6)
LYMPHOCYTES NFR BLD: 25.9 % (ref 21–52)
MCH RBC QN AUTO: 32.7 PG (ref 24–34)
MCHC RBC AUTO-ENTMCNC: 34.4 G/DL (ref 31–37)
MCV RBC AUTO: 95.1 FL (ref 78–100)
MONOCYTES # BLD: 0.59 K/UL (ref 0.05–1.2)
MONOCYTES NFR BLD: 10.5 % (ref 3–10)
NEUTS SEG # BLD: 3.38 K/UL (ref 1.8–8)
NEUTS SEG NFR BLD: 60 % (ref 40–73)
NRBC # BLD: 0 K/UL (ref 0–0.01)
NRBC BLD-RTO: 0 PER 100 WBC
PLATELET # BLD AUTO: 237 K/UL (ref 135–420)
PMV BLD AUTO: 8.7 FL (ref 9.2–11.8)
POTASSIUM SERPL-SCNC: 4.2 MMOL/L (ref 3.5–5.5)
PROTHROMBIN TIME: 13.6 SEC (ref 11.9–14.9)
RBC # BLD AUTO: 4.65 M/UL (ref 4.35–5.65)
SODIUM SERPL-SCNC: 133 MMOL/L (ref 136–145)
WBC # BLD AUTO: 5.6 K/UL (ref 4.6–13.2)

## 2025-02-17 PROCEDURE — C1769 GUIDE WIRE: HCPCS | Performed by: INTERNAL MEDICINE

## 2025-02-17 PROCEDURE — 85025 COMPLETE CBC W/AUTO DIFF WBC: CPT

## 2025-02-17 PROCEDURE — 92978 ENDOLUMINL IVUS OCT C 1ST: CPT | Performed by: INTERNAL MEDICINE

## 2025-02-17 PROCEDURE — C1760 CLOSURE DEV, VASC: HCPCS | Performed by: INTERNAL MEDICINE

## 2025-02-17 PROCEDURE — 6360000004 HC RX CONTRAST MEDICATION: Performed by: INTERNAL MEDICINE

## 2025-02-17 PROCEDURE — 76000 FLUOROSCOPY <1 HR PHYS/QHP: CPT | Performed by: INTERNAL MEDICINE

## 2025-02-17 PROCEDURE — 93458 L HRT ARTERY/VENTRICLE ANGIO: CPT | Performed by: INTERNAL MEDICINE

## 2025-02-17 PROCEDURE — 6370000000 HC RX 637 (ALT 250 FOR IP): Performed by: INTERNAL MEDICINE

## 2025-02-17 PROCEDURE — C1761 HC CATH TRANSLUM INTRAVASCULAR LITHOTRIPSY CORONARY: HCPCS | Performed by: INTERNAL MEDICINE

## 2025-02-17 PROCEDURE — 7100000010 HC PHASE II RECOVERY - FIRST 15 MIN: Performed by: INTERNAL MEDICINE

## 2025-02-17 PROCEDURE — C9600 PERC DRUG-EL COR STENT SING: HCPCS | Performed by: INTERNAL MEDICINE

## 2025-02-17 PROCEDURE — 76937 US GUIDE VASCULAR ACCESS: CPT | Performed by: INTERNAL MEDICINE

## 2025-02-17 PROCEDURE — C1713 ANCHOR/SCREW BN/BN,TIS/BN: HCPCS | Performed by: INTERNAL MEDICINE

## 2025-02-17 PROCEDURE — 2709999900 HC NON-CHARGEABLE SUPPLY: Performed by: INTERNAL MEDICINE

## 2025-02-17 PROCEDURE — C1894 INTRO/SHEATH, NON-LASER: HCPCS | Performed by: INTERNAL MEDICINE

## 2025-02-17 PROCEDURE — 7100000011 HC PHASE II RECOVERY - ADDTL 15 MIN: Performed by: INTERNAL MEDICINE

## 2025-02-17 PROCEDURE — 2580000003 HC RX 258: Performed by: INTERNAL MEDICINE

## 2025-02-17 PROCEDURE — C1725 CATH, TRANSLUMIN NON-LASER: HCPCS | Performed by: INTERNAL MEDICINE

## 2025-02-17 PROCEDURE — 80048 BASIC METABOLIC PNL TOTAL CA: CPT

## 2025-02-17 PROCEDURE — 6360000002 HC RX W HCPCS: Performed by: INTERNAL MEDICINE

## 2025-02-17 PROCEDURE — 92972 PERQ TRLUML CORONRY LITHOTRP: CPT | Performed by: INTERNAL MEDICINE

## 2025-02-17 PROCEDURE — C1753 CATH, INTRAVAS ULTRASOUND: HCPCS | Performed by: INTERNAL MEDICINE

## 2025-02-17 PROCEDURE — 85610 PROTHROMBIN TIME: CPT

## 2025-02-17 PROCEDURE — C1887 CATHETER, GUIDING: HCPCS | Performed by: INTERNAL MEDICINE

## 2025-02-17 PROCEDURE — C1874 STENT, COATED/COV W/DEL SYS: HCPCS | Performed by: INTERNAL MEDICINE

## 2025-02-17 DEVICE — XIENCE SKYPOINT™ EVEROLIMUS ELUTING CORONARY STENT SYSTEM 2.75 MM X 12 MM / RAPID-EXCHANGE
Type: IMPLANTABLE DEVICE | Status: FUNCTIONAL
Brand: XIENCE SKYPOINT™

## 2025-02-17 RX ORDER — IOPAMIDOL 612 MG/ML
INJECTION, SOLUTION INTRAVASCULAR PRN
Status: DISCONTINUED | OUTPATIENT
Start: 2025-02-17 | End: 2025-02-17 | Stop reason: HOSPADM

## 2025-02-17 RX ORDER — SODIUM CHLORIDE 9 MG/ML
INJECTION, SOLUTION INTRAVENOUS PRN
Status: DISCONTINUED | OUTPATIENT
Start: 2025-02-17 | End: 2025-02-17 | Stop reason: HOSPADM

## 2025-02-17 RX ORDER — SODIUM CHLORIDE 0.9 % (FLUSH) 0.9 %
5-40 SYRINGE (ML) INJECTION PRN
Status: DISCONTINUED | OUTPATIENT
Start: 2025-02-17 | End: 2025-02-17 | Stop reason: HOSPADM

## 2025-02-17 RX ORDER — CLOPIDOGREL BISULFATE 75 MG/1
75 TABLET ORAL DAILY
Qty: 30 TABLET | Refills: 0 | Status: SHIPPED | OUTPATIENT
Start: 2025-02-17

## 2025-02-17 RX ORDER — ASPIRIN 81 MG/1
81 TABLET, CHEWABLE ORAL ONCE
Status: DISCONTINUED | OUTPATIENT
Start: 2025-02-17 | End: 2025-02-17 | Stop reason: HOSPADM

## 2025-02-17 RX ORDER — FENTANYL CITRATE 50 UG/ML
INJECTION, SOLUTION INTRAMUSCULAR; INTRAVENOUS PRN
Status: DISCONTINUED | OUTPATIENT
Start: 2025-02-17 | End: 2025-02-17 | Stop reason: HOSPADM

## 2025-02-17 RX ORDER — ACETAMINOPHEN 325 MG/1
650 TABLET ORAL EVERY 4 HOURS PRN
Status: DISCONTINUED | OUTPATIENT
Start: 2025-02-17 | End: 2025-02-17 | Stop reason: HOSPADM

## 2025-02-17 RX ORDER — SODIUM CHLORIDE 0.9 % (FLUSH) 0.9 %
5-40 SYRINGE (ML) INJECTION EVERY 12 HOURS SCHEDULED
Status: DISCONTINUED | OUTPATIENT
Start: 2025-02-17 | End: 2025-02-17 | Stop reason: HOSPADM

## 2025-02-17 RX ORDER — SODIUM CHLORIDE 9 MG/ML
INJECTION, SOLUTION INTRAVENOUS CONTINUOUS
Status: DISCONTINUED | OUTPATIENT
Start: 2025-02-17 | End: 2025-02-17 | Stop reason: HOSPADM

## 2025-02-17 RX ORDER — BIVALIRUDIN 250 MG/5ML
INJECTION, POWDER, LYOPHILIZED, FOR SOLUTION INTRAVENOUS PRN
Status: DISCONTINUED | OUTPATIENT
Start: 2025-02-17 | End: 2025-02-17 | Stop reason: HOSPADM

## 2025-02-17 RX ORDER — LIDOCAINE HYDROCHLORIDE 10 MG/ML
INJECTION, SOLUTION EPIDURAL; INFILTRATION; INTRACAUDAL; PERINEURAL PRN
Status: DISCONTINUED | OUTPATIENT
Start: 2025-02-17 | End: 2025-02-17 | Stop reason: HOSPADM

## 2025-02-17 RX ORDER — MIDAZOLAM HYDROCHLORIDE 1 MG/ML
INJECTION, SOLUTION INTRAMUSCULAR; INTRAVENOUS PRN
Status: DISCONTINUED | OUTPATIENT
Start: 2025-02-17 | End: 2025-02-17 | Stop reason: HOSPADM

## 2025-02-17 RX ADMIN — SODIUM CHLORIDE 100 ML/HR: 9 INJECTION, SOLUTION INTRAVENOUS at 11:58

## 2025-02-17 NOTE — CONSULTS
[facility] - Consult Note    Date of  Admission: 2/17/2025  7:58 AM   Primary Care Physician:  Hiram Belle MD       Assessment:     Patient Active Problem List    Diagnosis Date Noted    CAD (coronary artery disease) 08/11/2023    Secondary hypercoagulable state (HCC) 05/02/2024    Asymptomatic varicose veins of bilateral lower extremities 08/09/2023    Type 2 diabetes mellitus (HCC) 02/03/2022    S/P CABG x 4 12/21/2021    Coronary artery disease with stable angina pectoris (HCC) 07/16/2020    HLD (hyperlipidemia) 03/13/2020    Atypical chest pain 03/13/2020    Gastroesophageal reflux disease 01/20/2020    Atherosclerosis of coronary artery 12/06/2019       IMPRESSION  Progressive angina  Coronary artery disease history of CABG x 4 vessel  Coronary risk equivalent diabetes mellitus  Hypercoagulable state  Hyperlipidemia    PLAN  Continue aspirin 81 mg p.o. daily  Monitor blood pressure, adjust antihypertensive medications as necessary.  Continue metoprolol succinate 50 mg p.o. daily, lisinopril 5 mg p.o. daily, Imdur 30 mg p.o. daily, Ranexa 500 mg p.o. twice daily  Statin if can tolerate prior to discharge.     Thank you for this consultation and for allowing us to participate in this patient's care.    Primary cardiologist Dr. BLANCA Warren     History of Present Illness:     This is a 69 y.o. male PMH as above, presented from clinic reported to have chest discomfort as well as shortness of breath persisting in spite of guideline directed medical therapy.  2D echo had normal LV function and history of PCI.  Patient is on multiple antianginals.  Last left heart catheterization LAD mid occlusion circumflex OM branch severe diffuse disease native vessels RCA 95% ostial stenosis LAD and LIMA patent sequential SVG to ramus and OM patent SVG to RCA likely occluded but unable to visualize with status post PCI stenting of the ostial proximal RCA August 2023.  Patient had a nuclear chemical stress test May

## 2025-02-17 NOTE — DISCHARGE INSTRUCTIONS
DISCHARGE SUMMARY from Nurse    PATIENT INSTRUCTIONS:     Please resume taking your home medications as prescribed. Except metformin. Restart the metformin 48 hours after your discharge.    After general anesthesia or intravenous sedation, for 24 hours or while taking prescription Narcotics:  Limit your activities  Do not drive and operate hazardous machinery  Do not make important personal or business decisions  Do  not drink alcoholic beverages  If you have not urinated within 8 hours after discharge, please contact your surgeon on call.    Report the following to your surgeon:  Excessive pain, swelling, redness or odor of or around the surgical area  Temperature over 100.5  Nausea and vomiting lasting longer than 4 hours or if unable to take medications  Any signs of decreased circulation or nerve impairment to extremity: change in color, persistent  numbness, tingling, coldness or increase pain  Any questions    What to do at Home:  Recommended activity: no lifting, Driving, or Strenuous exercise for 24 hours.    If you experience any of the following symptoms bleeding,swelling,acute pain or numbness, fever, please follow up with Dr. BATSHEVA Brownlee MD.    *  Please give a list of your current medications to your Primary Care Provider.    *  Please update this list whenever your medications are discontinued, doses are      changed, or new medications (including over-the-counter products) are added.    *  Please carry medication information at all times in case of emergency situations.    These are general instructions for a healthy lifestyle:    No smoking/ No tobacco products/ Avoid exposure to second hand smoke  Surgeon General's Warning:  Quitting smoking now greatly reduces serious risk to your health.    Obesity, smoking, and sedentary lifestyle greatly increases your risk for illness    A healthy diet, regular physical exercise & weight monitoring are important for maintaining a healthy lifestyle    You may be

## 2025-02-17 NOTE — PROGRESS NOTES
AVS Discharge instructions reviewed with patient and copy given to patient.  All questions answered and all medications reviewed with the patient, including when to resume medications.  Patient verbalized understanding to all discharge instructions.  PIV removed. Procedural site within normal limits.  No hematoma or bleeding noted from procedural and PIV site. No pain noted at discharge. Patient back to neurological baseline, alert and oriented times 4. Patient discharged in the presence of a responsible adult (yes) who will accompany patient home and is able to report post procedure complications.

## 2025-02-17 NOTE — PROGRESS NOTES
Received from cath lab, supine, A+Ox4, good respiratory effort. Right groin dressing intact, no bleeding or swelling. Denied any complaints. Safety instructions recvied with the patient.

## 2025-02-27 DIAGNOSIS — Z95.5 HISTORY OF CORONARY ANGIOPLASTY WITH INSERTION OF STENT: Primary | ICD-10-CM

## 2025-02-27 RX ORDER — CLOPIDOGREL BISULFATE 75 MG/1
75 TABLET ORAL DAILY
Qty: 90 TABLET | Refills: 3 | Status: SHIPPED | OUTPATIENT
Start: 2025-02-27

## 2025-03-04 SDOH — ECONOMIC STABILITY: INCOME INSECURITY: IN THE LAST 12 MONTHS, WAS THERE A TIME WHEN YOU WERE NOT ABLE TO PAY THE MORTGAGE OR RENT ON TIME?: NO

## 2025-03-04 SDOH — ECONOMIC STABILITY: TRANSPORTATION INSECURITY
IN THE PAST 12 MONTHS, HAS THE LACK OF TRANSPORTATION KEPT YOU FROM MEDICAL APPOINTMENTS OR FROM GETTING MEDICATIONS?: NO

## 2025-03-04 SDOH — ECONOMIC STABILITY: FOOD INSECURITY: WITHIN THE PAST 12 MONTHS, THE FOOD YOU BOUGHT JUST DIDN'T LAST AND YOU DIDN'T HAVE MONEY TO GET MORE.: NEVER TRUE

## 2025-03-04 SDOH — ECONOMIC STABILITY: TRANSPORTATION INSECURITY
IN THE PAST 12 MONTHS, HAS LACK OF TRANSPORTATION KEPT YOU FROM MEETINGS, WORK, OR FROM GETTING THINGS NEEDED FOR DAILY LIVING?: NO

## 2025-03-04 SDOH — ECONOMIC STABILITY: FOOD INSECURITY: WITHIN THE PAST 12 MONTHS, YOU WORRIED THAT YOUR FOOD WOULD RUN OUT BEFORE YOU GOT MONEY TO BUY MORE.: NEVER TRUE

## 2025-03-05 ENCOUNTER — OFFICE VISIT (OUTPATIENT)
Facility: CLINIC | Age: 70
End: 2025-03-05
Payer: MEDICARE

## 2025-03-05 VITALS
HEIGHT: 69 IN | BODY MASS INDEX: 33.92 KG/M2 | WEIGHT: 229 LBS | DIASTOLIC BLOOD PRESSURE: 68 MMHG | OXYGEN SATURATION: 94 % | SYSTOLIC BLOOD PRESSURE: 104 MMHG | RESPIRATION RATE: 20 BRPM | TEMPERATURE: 98.1 F | HEART RATE: 92 BPM

## 2025-03-05 DIAGNOSIS — E11.65 TYPE 2 DIABETES MELLITUS WITH HYPERGLYCEMIA, WITHOUT LONG-TERM CURRENT USE OF INSULIN (HCC): Primary | ICD-10-CM

## 2025-03-05 DIAGNOSIS — I25.708 ATHEROSCLEROSIS OF CORONARY ARTERY BYPASS GRAFT(S), UNSPECIFIED, WITH OTHER FORMS OF ANGINA PECTORIS: ICD-10-CM

## 2025-03-05 DIAGNOSIS — I10 ESSENTIAL (PRIMARY) HYPERTENSION: ICD-10-CM

## 2025-03-05 DIAGNOSIS — E11.9 COMPREHENSIVE DIABETIC FOOT EXAMINATION, TYPE 2 DM, ENCOUNTER FOR (HCC): ICD-10-CM

## 2025-03-05 DIAGNOSIS — E78.5 HYPERLIPIDEMIA, UNSPECIFIED HYPERLIPIDEMIA TYPE: ICD-10-CM

## 2025-03-05 DIAGNOSIS — E11.65 TYPE 2 DIABETES MELLITUS WITH HYPERGLYCEMIA, WITHOUT LONG-TERM CURRENT USE OF INSULIN (HCC): ICD-10-CM

## 2025-03-05 DIAGNOSIS — R39.9 LOWER URINARY TRACT SYMPTOMS (LUTS): ICD-10-CM

## 2025-03-05 PROCEDURE — 3074F SYST BP LT 130 MM HG: CPT | Performed by: FAMILY MEDICINE

## 2025-03-05 PROCEDURE — 3052F HG A1C>EQUAL 8.0%<EQUAL 9.0%: CPT | Performed by: FAMILY MEDICINE

## 2025-03-05 PROCEDURE — 1123F ACP DISCUSS/DSCN MKR DOCD: CPT | Performed by: FAMILY MEDICINE

## 2025-03-05 PROCEDURE — 99214 OFFICE O/P EST MOD 30 MIN: CPT | Performed by: FAMILY MEDICINE

## 2025-03-05 PROCEDURE — 3078F DIAST BP <80 MM HG: CPT | Performed by: FAMILY MEDICINE

## 2025-03-05 SDOH — ECONOMIC STABILITY: FOOD INSECURITY: WITHIN THE PAST 12 MONTHS, YOU WORRIED THAT YOUR FOOD WOULD RUN OUT BEFORE YOU GOT MONEY TO BUY MORE.: NEVER TRUE

## 2025-03-05 SDOH — ECONOMIC STABILITY: FOOD INSECURITY: WITHIN THE PAST 12 MONTHS, THE FOOD YOU BOUGHT JUST DIDN'T LAST AND YOU DIDN'T HAVE MONEY TO GET MORE.: NEVER TRUE

## 2025-03-05 ASSESSMENT — PATIENT HEALTH QUESTIONNAIRE - PHQ9
SUM OF ALL RESPONSES TO PHQ QUESTIONS 1-9: 0
1. LITTLE INTEREST OR PLEASURE IN DOING THINGS: NOT AT ALL
SUM OF ALL RESPONSES TO PHQ QUESTIONS 1-9: 0
2. FEELING DOWN, DEPRESSED OR HOPELESS: NOT AT ALL

## 2025-03-05 NOTE — PROGRESS NOTES
\"Have you been to the ER, urgent care clinic since your last visit?  Hospitalized since your last visit?\"    No    “Have you seen or consulted any other health care providers outside our system since your last visit?”    NO    “Have you had a diabetic eye exam?”    NO  Schedule in April     Date of last diabetic eye exam: 6/22/2023

## 2025-03-05 NOTE — PROGRESS NOTES
[facility]  Fab CARLI Chu comes in for follow up care.  CAD: Patient has coronary artery disease.  He had coronary stenting done on February 17 this year.  At the time he had been having fatigue, malaise and SOB.  He continues to be followed up by the cardiologist.  Currently feels stable and improved.  Patient has a previous history of quadruple bypass surgery and has also had 2 coronary stents placed in the past.   Patient has a visit to see his specialist this Friday.patient is on aspirin, ranolazine, Plavix, nitroglycerin, Imdur, lisinopril and metoprolol.  Patient is also on WelChol.  He will continue with the current treatment plan.    Shoulder pain: Patient has right shoulder pain.  He strained his right shoulder while trying to catch an object.  Since then has been having pain especially with overhead reach.  Pain has improved.  He takes Tylenol occasionally for the pain but overall feels improved.  We discussed management options including x-ray and seeing the orthopedist.  For now he would prefer to continue with supportive care measures.  If symptoms persist he will let us know.  DM2: Has type 2 diabetes mellitus.  He is on metformin 850 mg 2 times a day.  He is doing lifestyle and dietary modification.  Will check his HbA1c today.  His last HbA1c was 8.2.  I will do a foot exam today.  Patient has neuropathy lower extremities.  HTN: Patient has hypertension.  Blood pressure is stable.  Denies headache, changes in vision or focal weakness.  He is on lisinopril, metoprolol, imdur.  Patient will take a low-sodium diet.  Continue current treatment plan.  Dyslipidemia: Patient has dyslipidemia.  He is on welchol.  We will recheck lipid panel.  Patient will exercise and take a diet low in polysaturated fats.  LUTS: Patient has lower urinary tract symptoms.  He is on finasteride and flomax.  Denies dysuria or hematuria.  Will continue current treatment plan.      Past Medical History  Past Medical History:

## 2025-03-06 LAB
ESTIMATED AVERAGE GLUCOSE: 211 MG/DL (ref 91–123)
HBA1C MFR BLD: 9 % (ref 4.8–5.6)

## 2025-03-06 RX ORDER — LANCETS 33 GAUGE
EACH MISCELLANEOUS
Qty: 200 EACH | Refills: 3 | Status: SHIPPED | OUTPATIENT
Start: 2025-03-06

## 2025-03-06 RX ORDER — BLOOD SUGAR DIAGNOSTIC
STRIP MISCELLANEOUS
Qty: 200 STRIP | Refills: 3 | Status: SHIPPED | OUTPATIENT
Start: 2025-03-06

## 2025-03-06 NOTE — TELEPHONE ENCOUNTER
Last seen 3/5/2025   Last labs 2/17/2025  Last filled  6/3/2024 blood glucose test strips                    6/3/2024 Lancets   Next appointment 6/5/2025     Lab Results   Component Value Date     (L) 02/17/2025    K 4.2 02/17/2025     02/17/2025    CO2 24 02/17/2025    BUN 17 02/17/2025    CREATININE 0.98 02/17/2025    GLUCOSE 221 (H) 02/17/2025    CALCIUM 8.8 02/17/2025    BILITOT 0.2 01/03/2025    ALKPHOS 71 01/03/2025    AST 37 01/03/2025    ALT 50 (H) 01/03/2025    LABGLOM 83 02/17/2025    GFRAA >60 12/23/2021    AGRATIO 1.2 01/03/2025    GLOB 3.3 01/03/2025

## 2025-03-07 ENCOUNTER — OFFICE VISIT (OUTPATIENT)
Age: 70
End: 2025-03-07

## 2025-03-07 VITALS
HEIGHT: 69 IN | DIASTOLIC BLOOD PRESSURE: 72 MMHG | SYSTOLIC BLOOD PRESSURE: 114 MMHG | WEIGHT: 231 LBS | BODY MASS INDEX: 34.21 KG/M2 | HEART RATE: 78 BPM

## 2025-03-07 DIAGNOSIS — I25.10 ATHEROSCLEROSIS OF NATIVE CORONARY ARTERY OF NATIVE HEART WITHOUT ANGINA PECTORIS: ICD-10-CM

## 2025-03-07 DIAGNOSIS — Z95.5 HISTORY OF CORONARY ANGIOPLASTY WITH INSERTION OF STENT: Primary | ICD-10-CM

## 2025-03-07 DIAGNOSIS — Z95.1 S/P CABG X 4: ICD-10-CM

## 2025-03-07 DIAGNOSIS — E78.2 MIXED HYPERLIPIDEMIA: ICD-10-CM

## 2025-03-07 ASSESSMENT — PATIENT HEALTH QUESTIONNAIRE - PHQ9
SUM OF ALL RESPONSES TO PHQ QUESTIONS 1-9: 0
DEPRESSION UNABLE TO ASSESS: FUNCTIONAL CAPACITY MOTIVATION LIMITS ACCURACY
1. LITTLE INTEREST OR PLEASURE IN DOING THINGS: NOT AT ALL
SUM OF ALL RESPONSES TO PHQ QUESTIONS 1-9: 0
2. FEELING DOWN, DEPRESSED OR HOPELESS: NOT AT ALL
SUM OF ALL RESPONSES TO PHQ QUESTIONS 1-9: 0
SUM OF ALL RESPONSES TO PHQ QUESTIONS 1-9: 0

## 2025-03-07 NOTE — PROGRESS NOTES
1. Have you been to the ER, urgent care clinic since your last visit?  Hospitalized since your last visit?     No    2. Have you seen or consulted any other health care providers outside of the [facility] since your last visit?  Include any pap smears or colon screening.      No     
with increased shortness of breath denies palpitations or edema.  Myalgias have improved after discontinuation of statin    Follow-up  Associated symptoms include shortness of breath. Pertinent negatives include no chest pain, no abdominal pain and no headaches.     Review of Systems   Constitutional:  Negative for chills and fever.   HENT:  Negative for nosebleeds.    Eyes:  Negative for blurred vision and double vision.   Respiratory: Positive for shortness of breath,  Negative for shortness of breath, cough, hemoptysis, sputum production and wheezing.    Cardiovascular: Positive for chest pain, negative for palpitations, orthopnea, claudication, leg swelling and PND.   Gastrointestinal:  Negative for abdominal pain, heartburn, nausea and vomiting.   Musculoskeletal: Negative for myalgias.   Skin:  Negative for rash.   Neurological:  Negative for dizziness, weakness and headaches.   Endo/Heme/Allergies:  Does not bruise/bleed easily.     Family History   Problem Relation Age of Onset    Heart Attack Father     Heart Disease Sister     Heart Attack Brother     Anemia Brother     Leukemia Brother        Past Medical History:   Diagnosis Date    Bypass graft mechanical complication     quadropple    Diabetes (HCC)     Glaucoma     History of staph infection     knee    HTN (hypertension)     Hyperlipemia     SOB (shortness of breath)        Past Surgical History:   Procedure Laterality Date    CARDIAC PROCEDURE N/A 8/11/2023    Left heart cath performed by Jomar BRAVO MD at [facility] CARDIAC CATH LAB    CARDIAC PROCEDURE N/A 8/11/2023    Left ventriculography performed by Jomar BRAVO MD at [facility] CARDIAC CATH LAB    CARDIAC PROCEDURE N/A 8/11/2023    Percutaneous coronary intervention performed by Jomar BRAVO MD at [facility] CARDIAC CATH LAB    CARDIAC PROCEDURE N/A 2/17/2025    Left heart cath / coronary angiography performed by Dylan Brownlee MD at [facility] CARDIAC CATH LAB    CARDIAC PROCEDURE N/A 2/17/2025    Insert

## 2025-03-10 DIAGNOSIS — Z95.1 PRESENCE OF AORTOCORONARY BYPASS GRAFT: Primary | ICD-10-CM

## 2025-03-10 RX ORDER — DAPAGLIFLOZIN 10 MG/1
10 TABLET, FILM COATED ORAL EVERY MORNING
Qty: 30 TABLET | Refills: 5 | Status: SHIPPED | OUTPATIENT
Start: 2025-03-10

## 2025-03-10 NOTE — TELEPHONE ENCOUNTER
Patient want to know if you would write is amoxicillin antibiotics before he have his dental procedure. Please advise

## 2025-03-11 ENCOUNTER — RESULTS FOLLOW-UP (OUTPATIENT)
Facility: CLINIC | Age: 70
End: 2025-03-11

## 2025-03-11 RX ORDER — AMOXICILLIN 500 MG/1
1000 CAPSULE ORAL DAILY
Qty: 4 CAPSULE | Refills: 0 | Status: SHIPPED | OUTPATIENT
Start: 2025-03-11

## 2025-03-24 ENCOUNTER — TELEPHONE (OUTPATIENT)
Facility: HOSPITAL | Age: 70
End: 2025-03-24

## 2025-03-24 NOTE — TELEPHONE ENCOUNTER
Spoke to pt regarding upcoming appointment. Pt would like rehab closer to his home. Paperwork will be sent to [facility]. Pt aware that they will be contacting him.

## 2025-04-02 ENCOUNTER — HOSPITAL ENCOUNTER (OUTPATIENT)
Facility: HOSPITAL | Age: 70
Setting detail: RECURRING SERIES
Discharge: HOME | End: 2025-04-05
Payer: MEDICARE

## 2025-04-02 VITALS — BODY MASS INDEX: 33.17 KG/M2 | WEIGHT: 224.6 LBS

## 2025-04-02 PROCEDURE — 93798 PHYS/QHP OP CAR RHAB W/ECG: CPT

## 2025-04-02 ASSESSMENT — PATIENT HEALTH QUESTIONNAIRE - PHQ9
7. TROUBLE CONCENTRATING ON THINGS, SUCH AS READING THE NEWSPAPER OR WATCHING TELEVISION: NOT AT ALL
SUM OF ALL RESPONSES TO PHQ QUESTIONS 1-9: 1
10. IF YOU CHECKED OFF ANY PROBLEMS, HOW DIFFICULT HAVE THESE PROBLEMS MADE IT FOR YOU TO DO YOUR WORK, TAKE CARE OF THINGS AT HOME, OR GET ALONG WITH OTHER PEOPLE: NOT DIFFICULT AT ALL
SUM OF ALL RESPONSES TO PHQ QUESTIONS 1-9: 1
3. TROUBLE FALLING OR STAYING ASLEEP: NOT AT ALL
9. THOUGHTS THAT YOU WOULD BE BETTER OFF DEAD, OR OF HURTING YOURSELF: NOT AT ALL
5. POOR APPETITE OR OVEREATING: NOT AT ALL
2. FEELING DOWN, DEPRESSED OR HOPELESS: NOT AT ALL
SUM OF ALL RESPONSES TO PHQ QUESTIONS 1-9: 1
8. MOVING OR SPEAKING SO SLOWLY THAT OTHER PEOPLE COULD HAVE NOTICED. OR THE OPPOSITE, BEING SO FIGETY OR RESTLESS THAT YOU HAVE BEEN MOVING AROUND A LOT MORE THAN USUAL: NOT AT ALL
SUM OF ALL RESPONSES TO PHQ QUESTIONS 1-9: 1
1. LITTLE INTEREST OR PLEASURE IN DOING THINGS: NOT AT ALL
6. FEELING BAD ABOUT YOURSELF - OR THAT YOU ARE A FAILURE OR HAVE LET YOURSELF OR YOUR FAMILY DOWN: NOT AT ALL
4. FEELING TIRED OR HAVING LITTLE ENERGY: SEVERAL DAYS

## 2025-04-02 ASSESSMENT — EXERCISE STRESS TEST
PEAK_BP: 186/104
PEAK_RPE: 11
PEAK_HR: 111
PEAK_METS: 3.18

## 2025-04-02 ASSESSMENT — NEW YORK HEART ASSOCIATION (NYHA) CLASSIFICATION: NYHA FUNCTIONAL CLASS: NO NYHA CLASS OR UNABLE TO DETERMINE

## 2025-04-02 NOTE — PROGRESS NOTES
CARDIAC REHAB INITIAL ITP FOR REVIEW AND SIGNATURE    Fab Chu 69 y.o. presented to cardiac rehab for an intake and a six minute walk test today with a primary diagnosis of stent to Ostial/pRCA.  Patient's EF is 55-60%. Fab Chu has a history of diabetes, hypertension, hyperlipidemia, coronary artery disease, status post coronary artery stenting, and hx CABG x4 in 2019. Cardiac risk factors include smoking/ tobacco exposure, family history, dyslipidemia, diabetes mellitus, hypertension, stress and these were reviewed with patient.      Fab Chu is a  and lives alone.   PHQ-9, depression score, is 1 and this is considered to be low. The result was discussed with patient who confirms score to be accurate and if above a 5, a copy of the results were sent to patient's PCP.    Patient denied chest pain or SOB during 6 minute walk and the cardiac rhythm was in Sinus Tachycardia.    Fab Chu will attend exercise and educational sessions 3 days a week in cardiac rehab for 36 sessions.     Exceptions noted during intake include Bilateral knee replacements in 2017 and 2018, Neuropathy in bilateral feet, arthritis in lower extremities.    Goals for Rehab:  Be able to walk 2 blocks with SOB by next recert.  Stabilize BG, have most readings be in the \"green\" for the next 30 days.    Patient name: Fab Chu : 1955       Cardiac ITP   25 [id number]   Treatment Diagnosis   Treatment Diagnosis 1 PCI   PCI Date 25   Treatment Diagnosis 2 Stent   Referral Date 25   Significant Cardiovascular History Previous CABG;Previous PCI   NYHA Heart Failure Classification No NYHA class or unable to determine   Co-morbidities Diabetes   Individual Treatment Plan   ITP Visit Type Initial assessment   ITP Next Review Date 25   Visit #/Total Visits    EF% 55 %  (55-60%)   Risk Stratification High   Supplemental Oxygen   Oxygen Use No   Exercise Assessment   Stages of Change

## 2025-04-05 DIAGNOSIS — E78.5 HYPERLIPIDEMIA, UNSPECIFIED HYPERLIPIDEMIA TYPE: ICD-10-CM

## 2025-04-07 ENCOUNTER — HOSPITAL ENCOUNTER (OUTPATIENT)
Facility: HOSPITAL | Age: 70
Setting detail: RECURRING SERIES
Discharge: HOME | End: 2025-04-10
Payer: MEDICARE

## 2025-04-07 VITALS — BODY MASS INDEX: 33.14 KG/M2 | WEIGHT: 224.4 LBS

## 2025-04-07 PROCEDURE — 93798 PHYS/QHP OP CAR RHAB W/ECG: CPT

## 2025-04-07 ASSESSMENT — EXERCISE STRESS TEST
PEAK_METS: 3.7
PEAK_RPE: 11
PEAK_HR: 116

## 2025-04-08 RX ORDER — LISINOPRIL 5 MG/1
5 TABLET ORAL DAILY
Qty: 100 TABLET | Refills: 2 | Status: SHIPPED | OUTPATIENT
Start: 2025-04-08

## 2025-04-08 NOTE — TELEPHONE ENCOUNTER
Last seen 3/5/2025   Last labs 2/17/2025  Last filled  6/24/2024  Next appointment 4/10/2025     Lab Results   Component Value Date     (L) 02/17/2025    K 4.2 02/17/2025     02/17/2025    CO2 24 02/17/2025    BUN 17 02/17/2025    CREATININE 0.98 02/17/2025    GLUCOSE 221 (H) 02/17/2025    CALCIUM 8.8 02/17/2025    BILITOT 0.2 01/03/2025    ALKPHOS 71 01/03/2025    AST 37 01/03/2025    ALT 50 (H) 01/03/2025    LABGLOM 83 02/17/2025    GFRAA >60 12/23/2021    AGRATIO 1.2 01/03/2025    GLOB 3.3 01/03/2025

## 2025-04-09 ENCOUNTER — HOSPITAL ENCOUNTER (OUTPATIENT)
Facility: HOSPITAL | Age: 70
Setting detail: RECURRING SERIES
Discharge: HOME | End: 2025-04-12
Payer: MEDICARE

## 2025-04-09 VITALS — WEIGHT: 223.8 LBS | BODY MASS INDEX: 33.05 KG/M2

## 2025-04-09 PROCEDURE — 93798 PHYS/QHP OP CAR RHAB W/ECG: CPT

## 2025-04-09 ASSESSMENT — EXERCISE STRESS TEST
PEAK_METS: 3.2
PEAK_RPE: 13
PEAK_HR: 122

## 2025-04-10 ENCOUNTER — HOSPITAL ENCOUNTER (OUTPATIENT)
Facility: HOSPITAL | Age: 70
Setting detail: SPECIMEN
Discharge: HOME | End: 2025-04-13
Payer: MEDICARE

## 2025-04-10 ENCOUNTER — OFFICE VISIT (OUTPATIENT)
Facility: CLINIC | Age: 70
End: 2025-04-10
Payer: MEDICARE

## 2025-04-10 VITALS
HEART RATE: 85 BPM | OXYGEN SATURATION: 94 % | RESPIRATION RATE: 20 BRPM | TEMPERATURE: 98 F | HEIGHT: 69 IN | SYSTOLIC BLOOD PRESSURE: 130 MMHG | BODY MASS INDEX: 33.18 KG/M2 | DIASTOLIC BLOOD PRESSURE: 81 MMHG | WEIGHT: 224 LBS

## 2025-04-10 DIAGNOSIS — M19.90 ARTHRITIS: ICD-10-CM

## 2025-04-10 DIAGNOSIS — G62.9 NEUROPATHY: ICD-10-CM

## 2025-04-10 DIAGNOSIS — R39.9 LOWER URINARY TRACT SYMPTOMS (LUTS): ICD-10-CM

## 2025-04-10 DIAGNOSIS — I10 ESSENTIAL (PRIMARY) HYPERTENSION: ICD-10-CM

## 2025-04-10 DIAGNOSIS — E78.5 HYPERLIPIDEMIA, UNSPECIFIED HYPERLIPIDEMIA TYPE: ICD-10-CM

## 2025-04-10 DIAGNOSIS — E11.65 TYPE 2 DIABETES MELLITUS WITH HYPERGLYCEMIA, WITHOUT LONG-TERM CURRENT USE OF INSULIN (HCC): Primary | ICD-10-CM

## 2025-04-10 DIAGNOSIS — E11.65 TYPE 2 DIABETES MELLITUS WITH HYPERGLYCEMIA, WITHOUT LONG-TERM CURRENT USE OF INSULIN (HCC): ICD-10-CM

## 2025-04-10 DIAGNOSIS — I25.708 ATHEROSCLEROSIS OF CORONARY ARTERY BYPASS GRAFT(S), UNSPECIFIED, WITH OTHER FORMS OF ANGINA PECTORIS: ICD-10-CM

## 2025-04-10 LAB — HBA1C MFR BLD: 7.2 %

## 2025-04-10 PROCEDURE — 1123F ACP DISCUSS/DSCN MKR DOCD: CPT | Performed by: FAMILY MEDICINE

## 2025-04-10 PROCEDURE — 3051F HG A1C>EQUAL 7.0%<8.0%: CPT | Performed by: FAMILY MEDICINE

## 2025-04-10 PROCEDURE — 82570 ASSAY OF URINE CREATININE: CPT

## 2025-04-10 PROCEDURE — 83036 HEMOGLOBIN GLYCOSYLATED A1C: CPT | Performed by: FAMILY MEDICINE

## 2025-04-10 PROCEDURE — 3075F SYST BP GE 130 - 139MM HG: CPT | Performed by: FAMILY MEDICINE

## 2025-04-10 PROCEDURE — 3079F DIAST BP 80-89 MM HG: CPT | Performed by: FAMILY MEDICINE

## 2025-04-10 PROCEDURE — 99214 OFFICE O/P EST MOD 30 MIN: CPT | Performed by: FAMILY MEDICINE

## 2025-04-10 PROCEDURE — 82043 UR ALBUMIN QUANTITATIVE: CPT

## 2025-04-10 NOTE — PROGRESS NOTES
\"Have you been to the ER, urgent care clinic since your last visit?  Hospitalized since your last visit?\"    YES - When: approximately 2  weeks ago.  Where and Why: ..[facility]    “Have you seen or consulted any other health care providers outside our system since your last visit?”    NO    “Have you had a diabetic eye exam?”    NO     Date of last diabetic eye exam: 6/22/2023

## 2025-04-10 NOTE — PROGRESS NOTES
[facility]  Fab BOYCE Kimberley comes in for follow up care.  CAD: Patient has coronary artery disease.  Denies chest pain, shortness of breath or diaphoresis.  Currently undergoing cardiac rehab.  He was seen in the emergency room a few weeks ago for chest pain.  Had testing done that was reassuring.  Denies shortness of breath.  He is tolerating the cardiac rehab.  He is due to follow-up with a cardiologist.  He has a history of quadruple coronary artery bypass graft done.  Currently he is on Plavix, lisinopril, Farxiga, nitroglycerin as needed, ranolazine, Imdur, Repatha, WelChol, Toprol-XL and aspirin.  Tolerates these medications.  He will continue with medication management as recommended by the cardiologist.  DM2: Has type 2 diabetes mellitus.  Last HbA1c was 9.0.  Recheck today is 7.2.  We added Farxiga to his treatment plan.  He has been tolerating medication.  Comes in with a blood glucose log that is reassuring.  Patient is also on metformin 850 mg twice a day.  Continue current treatment plan.  He will intensify lifestyle and dietary modification.  HTN: Patient has hypertension.  Blood pressure is stable.  Denies headache, changes in vision or focal weakness.  He is on lisinopril 5 mg daily, Imdur 30 mg daily, metoprolol XL 50 mg daily.  He will continue with these medications.  He will take a low-sodium diet.  Dyslipidemia: Has dyslipidemia.  He will exercise and take a diet low in polysaturated fats.  Unable to tolerate statin medication.  Currently patient is on Repatha and WelChol.  He is followed up by the cardiologist.  Continue current treatment plan.  Will recheck lipid panel at next visit.  LUTS: Patient has lower urinary tract symptoms.  Denies dysuria or hematuria.  He is on Proscar and tamsulosin.  Will continue with these medications.  Arthritis: Patient has arthritis.  He gets joint aches and pains.  This especially in his hands.  He has early morning stiffness.  He is doing supportive care.  He takes

## 2025-04-11 ENCOUNTER — HOSPITAL ENCOUNTER (OUTPATIENT)
Facility: HOSPITAL | Age: 70
Setting detail: RECURRING SERIES
Discharge: HOME | End: 2025-04-14
Payer: MEDICARE

## 2025-04-11 VITALS — BODY MASS INDEX: 32.75 KG/M2 | WEIGHT: 221.8 LBS

## 2025-04-11 LAB
CREAT UR-MCNC: 101 MG/DL (ref 30–125)
MICROALBUMIN UR-MCNC: 1.16 MG/DL (ref 0–3)
MICROALBUMIN/CREAT UR-RTO: 11 MG/G (ref 0–30)

## 2025-04-11 PROCEDURE — 93798 PHYS/QHP OP CAR RHAB W/ECG: CPT

## 2025-04-11 ASSESSMENT — EXERCISE STRESS TEST
PEAK_RPE: 12
PEAK_METS: 3.7
PEAK_HR: 116

## 2025-04-14 ENCOUNTER — HOSPITAL ENCOUNTER (OUTPATIENT)
Facility: HOSPITAL | Age: 70
Setting detail: RECURRING SERIES
Discharge: HOME | End: 2025-04-17
Payer: MEDICARE

## 2025-04-14 VITALS — WEIGHT: 223 LBS | BODY MASS INDEX: 32.93 KG/M2

## 2025-04-14 PROCEDURE — 93798 PHYS/QHP OP CAR RHAB W/ECG: CPT

## 2025-04-14 ASSESSMENT — EXERCISE STRESS TEST
PEAK_METS: 3.7
PEAK_RPE: 13
PEAK_HR: 109

## 2025-04-16 ENCOUNTER — HOSPITAL ENCOUNTER (OUTPATIENT)
Facility: HOSPITAL | Age: 70
Setting detail: RECURRING SERIES
Discharge: HOME | End: 2025-04-19
Payer: MEDICARE

## 2025-04-16 VITALS — BODY MASS INDEX: 32.78 KG/M2 | WEIGHT: 222 LBS

## 2025-04-16 PROCEDURE — 93798 PHYS/QHP OP CAR RHAB W/ECG: CPT

## 2025-04-16 ASSESSMENT — EXERCISE STRESS TEST
PEAK_HR: 109
PEAK_RPE: 12
PEAK_METS: 24.6

## 2025-04-18 ENCOUNTER — HOSPITAL ENCOUNTER (OUTPATIENT)
Facility: HOSPITAL | Age: 70
Setting detail: RECURRING SERIES
Discharge: HOME | End: 2025-04-21
Payer: MEDICARE

## 2025-04-18 VITALS — WEIGHT: 222.4 LBS | BODY MASS INDEX: 32.84 KG/M2

## 2025-04-18 PROCEDURE — 93798 PHYS/QHP OP CAR RHAB W/ECG: CPT

## 2025-04-18 ASSESSMENT — EXERCISE STRESS TEST
PEAK_RPE: 11
PEAK_HR: 107
PEAK_METS: 3.5

## 2025-04-18 NOTE — TELEPHONE ENCOUNTER
Last seen 4/10/2025   Last labs 2/17/2025  Last filled  7/24/2024  Next appointment 6/5/2025     Lab Results   Component Value Date     (L) 02/17/2025    K 4.2 02/17/2025     02/17/2025    CO2 24 02/17/2025    BUN 17 02/17/2025    CREATININE 0.98 02/17/2025    GLUCOSE 221 (H) 02/17/2025    CALCIUM 8.8 02/17/2025    BILITOT 0.2 01/03/2025    ALKPHOS 71 01/03/2025    AST 37 01/03/2025    ALT 50 (H) 01/03/2025    LABGLOM 83 02/17/2025    GFRAA >60 12/23/2021    AGRATIO 1.2 01/03/2025    GLOB 3.3 01/03/2025

## 2025-04-21 ENCOUNTER — HOSPITAL ENCOUNTER (OUTPATIENT)
Facility: HOSPITAL | Age: 70
Setting detail: RECURRING SERIES
Discharge: HOME | End: 2025-04-24
Payer: MEDICARE

## 2025-04-21 VITALS — WEIGHT: 223.4 LBS | BODY MASS INDEX: 32.99 KG/M2

## 2025-04-21 PROCEDURE — 93798 PHYS/QHP OP CAR RHAB W/ECG: CPT

## 2025-04-21 RX ORDER — COLESEVELAM 180 1/1
1250 TABLET ORAL DAILY
Qty: 200 TABLET | Refills: 2 | Status: SHIPPED | OUTPATIENT
Start: 2025-04-21

## 2025-04-21 ASSESSMENT — EXERCISE STRESS TEST
PEAK_METS: 4.6
PEAK_HR: 112
PEAK_RPE: 13

## 2025-04-23 ENCOUNTER — HOSPITAL ENCOUNTER (OUTPATIENT)
Facility: HOSPITAL | Age: 70
Setting detail: RECURRING SERIES
Discharge: HOME | End: 2025-04-26
Payer: MEDICARE

## 2025-04-23 VITALS — BODY MASS INDEX: 32.78 KG/M2 | WEIGHT: 222 LBS

## 2025-04-23 PROCEDURE — 93798 PHYS/QHP OP CAR RHAB W/ECG: CPT

## 2025-04-23 ASSESSMENT — EXERCISE STRESS TEST
PEAK_RPE: 11
PEAK_HR: 119
PEAK_METS: 4.6

## 2025-04-25 ENCOUNTER — HOSPITAL ENCOUNTER (OUTPATIENT)
Facility: HOSPITAL | Age: 70
Setting detail: RECURRING SERIES
Discharge: HOME | End: 2025-04-28
Payer: MEDICARE

## 2025-04-25 VITALS — WEIGHT: 221.8 LBS | BODY MASS INDEX: 32.75 KG/M2

## 2025-04-25 PROCEDURE — 93798 PHYS/QHP OP CAR RHAB W/ECG: CPT

## 2025-04-25 ASSESSMENT — EXERCISE STRESS TEST
PEAK_HR: 114
PEAK_RPE: 12
PEAK_METS: 4.8

## 2025-04-28 ENCOUNTER — HOSPITAL ENCOUNTER (OUTPATIENT)
Facility: HOSPITAL | Age: 70
Setting detail: RECURRING SERIES
Discharge: HOME | End: 2025-05-01
Payer: MEDICARE

## 2025-04-28 VITALS — WEIGHT: 222.4 LBS | BODY MASS INDEX: 32.84 KG/M2

## 2025-04-28 PROCEDURE — 93798 PHYS/QHP OP CAR RHAB W/ECG: CPT

## 2025-04-28 ASSESSMENT — PATIENT HEALTH QUESTIONNAIRE - PHQ9
2. FEELING DOWN, DEPRESSED OR HOPELESS: NOT AT ALL
7. TROUBLE CONCENTRATING ON THINGS, SUCH AS READING THE NEWSPAPER OR WATCHING TELEVISION: NOT AT ALL
4. FEELING TIRED OR HAVING LITTLE ENERGY: NOT AT ALL
5. POOR APPETITE OR OVEREATING: NOT AT ALL
SUM OF ALL RESPONSES TO PHQ QUESTIONS 1-9: 0
SUM OF ALL RESPONSES TO PHQ QUESTIONS 1-9: 0
9. THOUGHTS THAT YOU WOULD BE BETTER OFF DEAD, OR OF HURTING YOURSELF: NOT AT ALL
SUM OF ALL RESPONSES TO PHQ QUESTIONS 1-9: 0
10. IF YOU CHECKED OFF ANY PROBLEMS, HOW DIFFICULT HAVE THESE PROBLEMS MADE IT FOR YOU TO DO YOUR WORK, TAKE CARE OF THINGS AT HOME, OR GET ALONG WITH OTHER PEOPLE: NOT DIFFICULT AT ALL
3. TROUBLE FALLING OR STAYING ASLEEP: NOT AT ALL
SUM OF ALL RESPONSES TO PHQ QUESTIONS 1-9: 0
8. MOVING OR SPEAKING SO SLOWLY THAT OTHER PEOPLE COULD HAVE NOTICED. OR THE OPPOSITE, BEING SO FIGETY OR RESTLESS THAT YOU HAVE BEEN MOVING AROUND A LOT MORE THAN USUAL: NOT AT ALL
1. LITTLE INTEREST OR PLEASURE IN DOING THINGS: NOT AT ALL
6. FEELING BAD ABOUT YOURSELF - OR THAT YOU ARE A FAILURE OR HAVE LET YOURSELF OR YOUR FAMILY DOWN: NOT AT ALL

## 2025-04-28 ASSESSMENT — EXERCISE STRESS TEST
PEAK_METS: 4.6
PEAK_RPE: 12
PEAK_HR: 116

## 2025-04-28 ASSESSMENT — NEW YORK HEART ASSOCIATION (NYHA) CLASSIFICATION: NYHA FUNCTIONAL CLASS: NO NYHA CLASS OR UNABLE TO DETERMINE

## 2025-04-28 NOTE — PROGRESS NOTES
Patient Treatment Goal Stabalize BP, monitor BP at home and record   Goal Status In progress   Progress Towards Goal continue to monitor BP at home   Medications   Resource Information Provided No   Med(s) Change No   BP Meds Lisinopril         DANISHA Landeros   4/28/2025 1:48 PM

## 2025-04-30 ENCOUNTER — HOSPITAL ENCOUNTER (OUTPATIENT)
Facility: HOSPITAL | Age: 70
Setting detail: RECURRING SERIES
Discharge: HOME | End: 2025-05-03
Payer: MEDICARE

## 2025-04-30 VITALS — BODY MASS INDEX: 33.09 KG/M2 | WEIGHT: 224.1 LBS

## 2025-04-30 PROCEDURE — 93798 PHYS/QHP OP CAR RHAB W/ECG: CPT

## 2025-04-30 ASSESSMENT — EXERCISE STRESS TEST
PEAK_RPE: 11
PEAK_HR: 109
PEAK_METS: 3.8

## 2025-05-02 ENCOUNTER — HOSPITAL ENCOUNTER (OUTPATIENT)
Facility: HOSPITAL | Age: 70
Setting detail: RECURRING SERIES
Discharge: HOME | End: 2025-05-05
Payer: MEDICARE

## 2025-05-02 VITALS — WEIGHT: 222.6 LBS | BODY MASS INDEX: 32.87 KG/M2

## 2025-05-02 PROCEDURE — 93798 PHYS/QHP OP CAR RHAB W/ECG: CPT

## 2025-05-02 ASSESSMENT — EXERCISE STRESS TEST
PEAK_RPE: 13
PEAK_METS: 4.6
PEAK_HR: 121

## 2025-05-05 ENCOUNTER — HOSPITAL ENCOUNTER (OUTPATIENT)
Facility: HOSPITAL | Age: 70
Setting detail: RECURRING SERIES
Discharge: HOME | End: 2025-05-08
Payer: MEDICARE

## 2025-05-05 VITALS — WEIGHT: 221.8 LBS | BODY MASS INDEX: 32.75 KG/M2

## 2025-05-05 PROCEDURE — 93798 PHYS/QHP OP CAR RHAB W/ECG: CPT

## 2025-05-05 ASSESSMENT — EXERCISE STRESS TEST
PEAK_RPE: 12
PEAK_METS: 3.6
PEAK_HR: 112

## 2025-05-07 ENCOUNTER — HOSPITAL ENCOUNTER (OUTPATIENT)
Facility: HOSPITAL | Age: 70
Setting detail: RECURRING SERIES
Discharge: HOME | End: 2025-05-10
Payer: MEDICARE

## 2025-05-07 VITALS — WEIGHT: 222 LBS | BODY MASS INDEX: 32.78 KG/M2

## 2025-05-07 PROCEDURE — 93798 PHYS/QHP OP CAR RHAB W/ECG: CPT

## 2025-05-07 ASSESSMENT — EXERCISE STRESS TEST
PEAK_HR: 113
PEAK_METS: 4.6
PEAK_RPE: 11

## 2025-05-09 ENCOUNTER — HOSPITAL ENCOUNTER (OUTPATIENT)
Facility: HOSPITAL | Age: 70
Setting detail: RECURRING SERIES
Discharge: HOME | End: 2025-05-12
Payer: MEDICARE

## 2025-05-09 VITALS — WEIGHT: 222 LBS | BODY MASS INDEX: 32.78 KG/M2

## 2025-05-09 PROCEDURE — 93798 PHYS/QHP OP CAR RHAB W/ECG: CPT

## 2025-05-09 ASSESSMENT — EXERCISE STRESS TEST
PEAK_METS: 4.6
PEAK_RPE: 15
PEAK_HR: 117

## 2025-05-12 ENCOUNTER — HOSPITAL ENCOUNTER (OUTPATIENT)
Facility: HOSPITAL | Age: 70
Setting detail: RECURRING SERIES
Discharge: HOME | End: 2025-05-15
Payer: MEDICARE

## 2025-05-12 VITALS — BODY MASS INDEX: 32.78 KG/M2 | WEIGHT: 222 LBS

## 2025-05-12 PROCEDURE — 93798 PHYS/QHP OP CAR RHAB W/ECG: CPT

## 2025-05-12 ASSESSMENT — EXERCISE STRESS TEST
PEAK_RPE: 15
PEAK_HR: 118
PEAK_METS: 4.6

## 2025-05-14 ENCOUNTER — HOSPITAL ENCOUNTER (OUTPATIENT)
Facility: HOSPITAL | Age: 70
Setting detail: RECURRING SERIES
Discharge: HOME | End: 2025-05-17
Payer: MEDICARE

## 2025-05-14 VITALS — WEIGHT: 221.6 LBS | BODY MASS INDEX: 32.72 KG/M2

## 2025-05-14 PROCEDURE — 93798 PHYS/QHP OP CAR RHAB W/ECG: CPT

## 2025-05-14 ASSESSMENT — EXERCISE STRESS TEST
PEAK_HR: 112
PEAK_METS: 3.8
PEAK_RPE: 14

## 2025-05-16 ENCOUNTER — HOSPITAL ENCOUNTER (OUTPATIENT)
Facility: HOSPITAL | Age: 70
Setting detail: RECURRING SERIES
Discharge: HOME | End: 2025-05-19
Payer: MEDICARE

## 2025-05-16 VITALS — BODY MASS INDEX: 32.55 KG/M2 | WEIGHT: 220.4 LBS

## 2025-05-16 PROCEDURE — 93798 PHYS/QHP OP CAR RHAB W/ECG: CPT

## 2025-05-16 ASSESSMENT — EXERCISE STRESS TEST
PEAK_RPE: 13
PEAK_HR: 114
PEAK_METS: 10.2

## 2025-05-19 RX ORDER — RANOLAZINE 500 MG/1
500 TABLET, EXTENDED RELEASE ORAL 2 TIMES DAILY
Qty: 180 TABLET | Refills: 1 | Status: SHIPPED | OUTPATIENT
Start: 2025-05-19

## 2025-05-21 ENCOUNTER — HOSPITAL ENCOUNTER (OUTPATIENT)
Facility: HOSPITAL | Age: 70
Setting detail: RECURRING SERIES
Discharge: HOME | End: 2025-05-24
Payer: MEDICARE

## 2025-05-21 VITALS — WEIGHT: 221.4 LBS | BODY MASS INDEX: 32.7 KG/M2

## 2025-05-21 PROCEDURE — 93798 PHYS/QHP OP CAR RHAB W/ECG: CPT

## 2025-05-21 ASSESSMENT — EXERCISE STRESS TEST
PEAK_METS: 10.2
PEAK_HR: 118
PEAK_RPE: 13

## 2025-05-23 ENCOUNTER — HOSPITAL ENCOUNTER (OUTPATIENT)
Facility: HOSPITAL | Age: 70
Setting detail: RECURRING SERIES
Discharge: HOME | End: 2025-05-26
Payer: MEDICARE

## 2025-05-23 VITALS — BODY MASS INDEX: 32.52 KG/M2 | WEIGHT: 220.2 LBS

## 2025-05-23 PROCEDURE — 93798 PHYS/QHP OP CAR RHAB W/ECG: CPT

## 2025-05-23 ASSESSMENT — PATIENT HEALTH QUESTIONNAIRE - PHQ9
8. MOVING OR SPEAKING SO SLOWLY THAT OTHER PEOPLE COULD HAVE NOTICED. OR THE OPPOSITE, BEING SO FIGETY OR RESTLESS THAT YOU HAVE BEEN MOVING AROUND A LOT MORE THAN USUAL: NOT AT ALL
10. IF YOU CHECKED OFF ANY PROBLEMS, HOW DIFFICULT HAVE THESE PROBLEMS MADE IT FOR YOU TO DO YOUR WORK, TAKE CARE OF THINGS AT HOME, OR GET ALONG WITH OTHER PEOPLE: NOT DIFFICULT AT ALL
SUM OF ALL RESPONSES TO PHQ QUESTIONS 1-9: 0
7. TROUBLE CONCENTRATING ON THINGS, SUCH AS READING THE NEWSPAPER OR WATCHING TELEVISION: NOT AT ALL
SUM OF ALL RESPONSES TO PHQ QUESTIONS 1-9: 0
1. LITTLE INTEREST OR PLEASURE IN DOING THINGS: NOT AT ALL
5. POOR APPETITE OR OVEREATING: NOT AT ALL
SUM OF ALL RESPONSES TO PHQ QUESTIONS 1-9: 0
3. TROUBLE FALLING OR STAYING ASLEEP: NOT AT ALL
9. THOUGHTS THAT YOU WOULD BE BETTER OFF DEAD, OR OF HURTING YOURSELF: NOT AT ALL
2. FEELING DOWN, DEPRESSED OR HOPELESS: NOT AT ALL
4. FEELING TIRED OR HAVING LITTLE ENERGY: NOT AT ALL
SUM OF ALL RESPONSES TO PHQ QUESTIONS 1-9: 0
6. FEELING BAD ABOUT YOURSELF - OR THAT YOU ARE A FAILURE OR HAVE LET YOURSELF OR YOUR FAMILY DOWN: NOT AT ALL

## 2025-05-23 ASSESSMENT — NEW YORK HEART ASSOCIATION (NYHA) CLASSIFICATION: NYHA FUNCTIONAL CLASS: NO NYHA CLASS OR UNABLE TO DETERMINE

## 2025-05-23 ASSESSMENT — EXERCISE STRESS TEST
PEAK_METS: 5
PEAK_HR: 120
PEAK_RPE: 13

## 2025-05-23 NOTE — PROGRESS NOTES
Provided No   Med(s) Change No   BP Meds Lisinopril   ACE/ARB/ARNI Prescribed Yes   ACE/ARB/ARNI Adherent Yes   BB Prescribed Yes   BB Adherent Yes   Antiplatelet/Anticoagulant Prescribed Yes   Antiplatelet/Anticoagulant Adherent Yes   Statins/Anti-hyperlipidemic Prescribed Yes   Statins/Anti-hyperlipidemic Adherent Yes   Statins/Anti-hyperlipidemic Intensity High   Other Core Component - Medication Compliance   Medication Compliance Managed as Core Component No   Other Core Component - Other Risk Factor   Other Modifiable Risk Factor Managed as Core Component No       Timur Millan   5/23/2025 2:56 PM

## 2025-05-28 ENCOUNTER — HOSPITAL ENCOUNTER (OUTPATIENT)
Facility: HOSPITAL | Age: 70
Setting detail: RECURRING SERIES
Discharge: HOME | End: 2025-05-31
Payer: MEDICARE

## 2025-05-28 VITALS — BODY MASS INDEX: 32.72 KG/M2 | WEIGHT: 221.6 LBS

## 2025-05-28 PROCEDURE — 93798 PHYS/QHP OP CAR RHAB W/ECG: CPT

## 2025-05-28 ASSESSMENT — EXERCISE STRESS TEST
PEAK_METS: 10.2
PEAK_RPE: 15
PEAK_HR: 114

## 2025-05-30 ENCOUNTER — HOSPITAL ENCOUNTER (OUTPATIENT)
Facility: HOSPITAL | Age: 70
Setting detail: RECURRING SERIES
End: 2025-05-30
Payer: MEDICARE

## 2025-05-30 VITALS — WEIGHT: 222.4 LBS | BODY MASS INDEX: 32.84 KG/M2

## 2025-05-30 PROCEDURE — 93798 PHYS/QHP OP CAR RHAB W/ECG: CPT

## 2025-05-30 ASSESSMENT — EXERCISE STRESS TEST
PEAK_METS: 10.2
PEAK_HR: 114
PEAK_RPE: 14

## 2025-05-31 DIAGNOSIS — I48.0 PAROXYSMAL ATRIAL FIBRILLATION (HCC): ICD-10-CM

## 2025-06-02 ENCOUNTER — APPOINTMENT (OUTPATIENT)
Facility: HOSPITAL | Age: 70
End: 2025-06-02
Payer: MEDICARE

## 2025-06-02 RX ORDER — METOPROLOL SUCCINATE 50 MG/1
50 TABLET, EXTENDED RELEASE ORAL NIGHTLY
Qty: 100 TABLET | Refills: 2 | Status: SHIPPED | OUTPATIENT
Start: 2025-06-02

## 2025-06-04 ENCOUNTER — HOSPITAL ENCOUNTER (OUTPATIENT)
Facility: HOSPITAL | Age: 70
Setting detail: RECURRING SERIES
Discharge: HOME | End: 2025-06-07
Payer: MEDICARE

## 2025-06-04 VITALS — WEIGHT: 217.6 LBS | BODY MASS INDEX: 32.13 KG/M2

## 2025-06-04 PROCEDURE — 93798 PHYS/QHP OP CAR RHAB W/ECG: CPT

## 2025-06-04 ASSESSMENT — EXERCISE STRESS TEST
PEAK_METS: 10.2
PEAK_RPE: 14
PEAK_HR: 113

## 2025-06-05 ENCOUNTER — OFFICE VISIT (OUTPATIENT)
Facility: CLINIC | Age: 70
End: 2025-06-05
Payer: MEDICARE

## 2025-06-05 VITALS
BODY MASS INDEX: 32.73 KG/M2 | SYSTOLIC BLOOD PRESSURE: 123 MMHG | OXYGEN SATURATION: 96 % | TEMPERATURE: 98.1 F | DIASTOLIC BLOOD PRESSURE: 58 MMHG | HEART RATE: 87 BPM | HEIGHT: 69 IN | WEIGHT: 221 LBS | RESPIRATION RATE: 20 BRPM

## 2025-06-05 DIAGNOSIS — T46.6X5A STATIN MYOPATHY: ICD-10-CM

## 2025-06-05 DIAGNOSIS — R39.9 LOWER URINARY TRACT SYMPTOMS (LUTS): ICD-10-CM

## 2025-06-05 DIAGNOSIS — M54.50 CHRONIC MIDLINE LOW BACK PAIN, UNSPECIFIED WHETHER SCIATICA PRESENT: ICD-10-CM

## 2025-06-05 DIAGNOSIS — I10 ESSENTIAL (PRIMARY) HYPERTENSION: ICD-10-CM

## 2025-06-05 DIAGNOSIS — G72.0 STATIN MYOPATHY: ICD-10-CM

## 2025-06-05 DIAGNOSIS — G89.29 CHRONIC MIDLINE LOW BACK PAIN, UNSPECIFIED WHETHER SCIATICA PRESENT: ICD-10-CM

## 2025-06-05 DIAGNOSIS — E78.5 HYPERLIPIDEMIA, UNSPECIFIED HYPERLIPIDEMIA TYPE: ICD-10-CM

## 2025-06-05 DIAGNOSIS — G62.9 NEUROPATHY: ICD-10-CM

## 2025-06-05 DIAGNOSIS — E11.65 TYPE 2 DIABETES MELLITUS WITH HYPERGLYCEMIA, WITHOUT LONG-TERM CURRENT USE OF INSULIN (HCC): Primary | ICD-10-CM

## 2025-06-05 DIAGNOSIS — M19.90 ARTHRITIS: ICD-10-CM

## 2025-06-05 DIAGNOSIS — I25.708 ATHEROSCLEROSIS OF CORONARY ARTERY BYPASS GRAFT(S), UNSPECIFIED, WITH OTHER FORMS OF ANGINA PECTORIS: ICD-10-CM

## 2025-06-05 LAB — HBA1C MFR BLD: 6.7 %

## 2025-06-05 PROCEDURE — 3044F HG A1C LEVEL LT 7.0%: CPT | Performed by: FAMILY MEDICINE

## 2025-06-05 PROCEDURE — 1123F ACP DISCUSS/DSCN MKR DOCD: CPT | Performed by: FAMILY MEDICINE

## 2025-06-05 PROCEDURE — 83036 HEMOGLOBIN GLYCOSYLATED A1C: CPT | Performed by: FAMILY MEDICINE

## 2025-06-05 PROCEDURE — 3074F SYST BP LT 130 MM HG: CPT | Performed by: FAMILY MEDICINE

## 2025-06-05 PROCEDURE — 99214 OFFICE O/P EST MOD 30 MIN: CPT | Performed by: FAMILY MEDICINE

## 2025-06-05 PROCEDURE — 3078F DIAST BP <80 MM HG: CPT | Performed by: FAMILY MEDICINE

## 2025-06-05 RX ORDER — DAPAGLIFLOZIN 10 MG/1
10 TABLET, FILM COATED ORAL EVERY MORNING
Qty: 90 TABLET | Refills: 1 | Status: SHIPPED | OUTPATIENT
Start: 2025-06-05

## 2025-06-05 RX ORDER — DAPAGLIFLOZIN 10 MG/1
10 TABLET, FILM COATED ORAL EVERY MORNING
Qty: 90 TABLET | Refills: 1 | Status: SHIPPED | OUTPATIENT
Start: 2025-06-05 | End: 2025-06-05 | Stop reason: SDUPTHER

## 2025-06-05 NOTE — PROGRESS NOTES
Have you been to the ER, urgent care clinic since your last visit?  Hospitalized since your last visit?   NO    Have you seen or consulted any other health care providers outside our system since your last visit?   NO    “Have you had a diabetic eye exam?”    NO     Date of last diabetic eye exam: 6/22/2023          
Muscle joint pain    Heparin Other (See Comments)     thrombocytopenia    Methylprednisolone Other (See Comments)     Muscle weakness    Statins Other (See Comments)     Muscle weakness  Muscle joint pain         Family History  Family History   Problem Relation Age of Onset    Heart Attack Father     Heart Disease Sister     Heart Attack Brother     Anemia Brother     Leukemia Brother        Social History  Social History     Socioeconomic History    Marital status:      Spouse name: Not on file    Number of children: Not on file    Years of education: Not on file    Highest education level: Not on file   Occupational History    Not on file   Tobacco Use    Smoking status: Former     Current packs/day: 0.00     Types: Cigarettes     Quit date: 1970     Years since quittin.4    Smokeless tobacco: Never   Substance and Sexual Activity    Alcohol use: Yes     Alcohol/week: 1.0 standard drink of alcohol     Types: 1 Standard drinks or equivalent per week     Comment: occ    Drug use: Never    Sexual activity: Defer   Other Topics Concern    Not on file   Social History Narrative    Not on file     Social Drivers of Health     Financial Resource Strain: Low Risk  (2024)    Overall Financial Resource Strain (CARDIA)     Difficulty of Paying Living Expenses: Not hard at all   Food Insecurity: No Food Insecurity (3/5/2025)    Hunger Vital Sign     Worried About Running Out of Food in the Last Year: Never true     Ran Out of Food in the Last Year: Never true   Transportation Needs: No Transportation Needs (3/5/2025)    PRAPARE - Transportation     Lack of Transportation (Medical): No     Lack of Transportation (Non-Medical): No   Physical Activity: Inactive (2024)    Exercise Vital Sign     Days of Exercise per Week: 0 days     Minutes of Exercise per Session: 0 min   Stress: Not on file   Social Connections: Not on file   Intimate Partner Violence: Not At Risk (2022)    Humiliation, Afraid,

## 2025-06-06 ENCOUNTER — TELEPHONE (OUTPATIENT)
Dept: PHARMACY | Facility: CLINIC | Age: 70
End: 2025-06-06

## 2025-06-06 ENCOUNTER — HOSPITAL ENCOUNTER (OUTPATIENT)
Facility: HOSPITAL | Age: 70
Setting detail: RECURRING SERIES
Discharge: HOME | End: 2025-06-09
Payer: MEDICARE

## 2025-06-06 VITALS — BODY MASS INDEX: 32.58 KG/M2 | WEIGHT: 220.6 LBS

## 2025-06-06 PROCEDURE — 93798 PHYS/QHP OP CAR RHAB W/ECG: CPT

## 2025-06-06 ASSESSMENT — EXERCISE STRESS TEST
PEAK_HR: 117
PEAK_RPE: 14
PEAK_METS: 10.2

## 2025-06-06 NOTE — TELEPHONE ENCOUNTER
(Prediabetes R73.03, R73.09) (  Hemoglobin A1C   Date Value Ref Range Status   03/05/2025 9.0 (H) 4.8 - 5.6 % Final    )  [x]Statin Listed as Allergy   Allergies   Allergen Reactions    Ezetimibe Other (See Comments)     Muscle joint pain    Heparin Other (See Comments)     thrombocytopenia    Methylprednisolone Other (See Comments)     Muscle weakness    Statins Other (See Comments)     Muscle weakness  Muscle joint pain        []Other dx can exclude(Cirrhosis,ESRD,Hospice, Dialysis, PCOS, pregnancy/lactation/fertility)    LIPID EVALUATION AND GUIDELINE ASSESSMENT  Lab Results   Component Value Date/Time    CHOL 144 12/06/2024 07:43 AM    TRIG 134 12/06/2024 07:43 AM    HDL 39 12/06/2024 07:43 AM    LDL 78 12/06/2024 07:43 AM    LDL 95 07/21/2023 07:24 AM    VLDL 27 12/06/2024 07:43 AM    ALT 50 01/03/2025 07:31 AM    AST 37 01/03/2025 07:31 AM     The 10-year ASCVD risk score (Myra ARIAS, et al., 2019) is: 32.8%    Values used to calculate the score:      Age: 69 years      Sex: Male      Is Non- : No      Diabetic: Yes      Tobacco smoker: No      Systolic Blood Pressure: 123 mmHg      Is BP treated: Yes      HDL Cholesterol: 39 mg/dL      Total Cholesterol: 144 mg/dL      ADA 2023/ 2018 ACC Guidelines indicate the patient is a candidate for a high-intensity statin.    2018 ACC/AHA/ 2023 ADA Guidelines:  21-76 yo Males; Patient has:Patient with Clinical ASCVD and Diabetes with LDL >55 mg/dL (78 mg/dL) (ADA 2023)  LDL Target goal: <55 mg/dL -Aged 40-76 yo with Diabetes with Atherosclerotic Cardiovascular disease and reduced LDL of >/= 50% (ADA 2023)      STATIN GAP PLAN  The following are interventions that have been identified:  SUPD & SPC Gap Identified from [employer] Records dated: 06/06/25   Patient may be excluded with an addition of a CMS approved dx code added to visit diagnosis within 6/5/2025 visit encounter.      Lien Brown  PharmD, BCACP  Population Health Pharmacist  [place]

## 2025-06-09 ENCOUNTER — HOSPITAL ENCOUNTER (OUTPATIENT)
Facility: HOSPITAL | Age: 70
Setting detail: RECURRING SERIES
Discharge: HOME | End: 2025-06-12
Payer: MEDICARE

## 2025-06-09 VITALS — WEIGHT: 221.4 LBS | BODY MASS INDEX: 32.7 KG/M2

## 2025-06-09 PROCEDURE — 93798 PHYS/QHP OP CAR RHAB W/ECG: CPT

## 2025-06-09 ASSESSMENT — EXERCISE STRESS TEST
PEAK_RPE: 15
PEAK_METS: 10.2
PEAK_HR: 125

## 2025-06-11 ENCOUNTER — HOSPITAL ENCOUNTER (OUTPATIENT)
Facility: HOSPITAL | Age: 70
Setting detail: RECURRING SERIES
Discharge: HOME | End: 2025-06-14
Payer: MEDICARE

## 2025-06-11 VITALS — WEIGHT: 219.2 LBS | BODY MASS INDEX: 32.37 KG/M2

## 2025-06-11 PROCEDURE — 93798 PHYS/QHP OP CAR RHAB W/ECG: CPT

## 2025-06-11 ASSESSMENT — EXERCISE STRESS TEST
PEAK_METS: 10.2
PEAK_HR: 122
PEAK_RPE: 15

## 2025-06-13 ENCOUNTER — APPOINTMENT (OUTPATIENT)
Facility: HOSPITAL | Age: 70
End: 2025-06-13
Payer: MEDICARE

## 2025-06-16 ENCOUNTER — HOSPITAL ENCOUNTER (OUTPATIENT)
Facility: HOSPITAL | Age: 70
Setting detail: RECURRING SERIES
Discharge: HOME | End: 2025-06-19
Payer: MEDICARE

## 2025-06-16 VITALS — BODY MASS INDEX: 32.43 KG/M2 | WEIGHT: 219.6 LBS

## 2025-06-16 PROCEDURE — 93798 PHYS/QHP OP CAR RHAB W/ECG: CPT

## 2025-06-16 ASSESSMENT — EXERCISE STRESS TEST
PEAK_RPE: 15
PEAK_HR: 122
PEAK_METS: 10.2

## 2025-06-18 ENCOUNTER — HOSPITAL ENCOUNTER (OUTPATIENT)
Facility: HOSPITAL | Age: 70
Setting detail: RECURRING SERIES
Discharge: HOME | End: 2025-06-21
Payer: MEDICARE

## 2025-06-18 VITALS — BODY MASS INDEX: 32.46 KG/M2 | WEIGHT: 219.8 LBS

## 2025-06-18 PROCEDURE — 93798 PHYS/QHP OP CAR RHAB W/ECG: CPT

## 2025-06-18 ASSESSMENT — PATIENT HEALTH QUESTIONNAIRE - PHQ9
3. TROUBLE FALLING OR STAYING ASLEEP: NOT AT ALL
5. POOR APPETITE OR OVEREATING: NOT AT ALL
6. FEELING BAD ABOUT YOURSELF - OR THAT YOU ARE A FAILURE OR HAVE LET YOURSELF OR YOUR FAMILY DOWN: NOT AT ALL
SUM OF ALL RESPONSES TO PHQ QUESTIONS 1-9: 0
9. THOUGHTS THAT YOU WOULD BE BETTER OFF DEAD, OR OF HURTING YOURSELF: NOT AT ALL
2. FEELING DOWN, DEPRESSED OR HOPELESS: NOT AT ALL
SUM OF ALL RESPONSES TO PHQ QUESTIONS 1-9: 0
4. FEELING TIRED OR HAVING LITTLE ENERGY: NOT AT ALL
7. TROUBLE CONCENTRATING ON THINGS, SUCH AS READING THE NEWSPAPER OR WATCHING TELEVISION: NOT AT ALL
8. MOVING OR SPEAKING SO SLOWLY THAT OTHER PEOPLE COULD HAVE NOTICED. OR THE OPPOSITE, BEING SO FIGETY OR RESTLESS THAT YOU HAVE BEEN MOVING AROUND A LOT MORE THAN USUAL: NOT AT ALL
10. IF YOU CHECKED OFF ANY PROBLEMS, HOW DIFFICULT HAVE THESE PROBLEMS MADE IT FOR YOU TO DO YOUR WORK, TAKE CARE OF THINGS AT HOME, OR GET ALONG WITH OTHER PEOPLE: NOT DIFFICULT AT ALL
1. LITTLE INTEREST OR PLEASURE IN DOING THINGS: NOT AT ALL

## 2025-06-18 ASSESSMENT — EXERCISE STRESS TEST
PEAK_RPE: 14
PEAK_METS: 10.2
PEAK_HR: 116

## 2025-06-18 ASSESSMENT — NEW YORK HEART ASSOCIATION (NYHA) CLASSIFICATION: NYHA FUNCTIONAL CLASS: NO NYHA CLASS OR UNABLE TO DETERMINE

## 2025-06-18 NOTE — PROGRESS NOTES
Cardiac Rehab ITP Reassessment for Review and Signature    Subjective Report: Pt progressing well. Expected to graduate before next recert.    Cardiac ITP     06/18/25 1100   Treatment Diagnosis   Treatment Diagnosis 1 PCI   PCI Date 02/17/25   Treatment Diagnosis 2 Stent   Referral Date 02/17/25   Significant Cardiovascular History Previous CABG;Previous PCI   NYHA Heart Failure Classification No NYHA class or unable to determine   Co-morbidities Diabetes   Individual Treatment Plan   ITP Visit Type Re-assessment   ITP Next Review Date 07/14/25   Visit #/Total Visits 29/36   EF% 55 %  (55-60%)   Risk Stratification High   Supplemental Oxygen   Oxygen Use No   Exercise Assessment   Stages of Change Action   Assisted Devices None   Exercise Intervention/Prescription   Mode Track;Treadmill;Bike;Stepper;Ergometer;Elliptical;Rower;Resistance training   Frequency per week 2-3   Duration Per Session 35-45 minutes   Intensity METS       3.18-5.18   Progression Progress per protocol   Symptoms with Exercise Denies   Target Heart Rate    Resistance Training Yes   RPE 12-16   Exercise Activity at Home   Type Stretching, bike, treadmill, water aerobics   Frequency 3 days/week   Duration 60   Resistance Training Yes   Exercise Education   Education Attended class;Self pulse;Exercise safety;Signs/symptoms to report;RPE scale;Equipment orientation;Warm up/cool down;Physically active   Exercise Goals   Patient Target Goals Individual exercise RX;Maintain exercise and activity at a moderate intensity;Aerobic activity 30 + minutes/day  5 days/week   Patient Treatment Goal Be able to walk 2 blocks without feeling SOB by next recert   Patient Treatment Goal (New) Increase strength   Psychosocial Assessment   Stages of Change Action   PHQ-9 Total Score 0   Psychosocial Intervention   Interventions No intervention indicated   Stress Management Techniques Connects with others;Other (comment)  (speaks with daughter, stays busy with

## 2025-06-19 ENCOUNTER — HOSPITAL ENCOUNTER (OUTPATIENT)
Facility: HOSPITAL | Age: 70
Setting detail: RECURRING SERIES
Discharge: HOME | End: 2025-06-22
Payer: MEDICARE

## 2025-06-19 VITALS — WEIGHT: 220.6 LBS | BODY MASS INDEX: 32.58 KG/M2

## 2025-06-19 PROCEDURE — 93798 PHYS/QHP OP CAR RHAB W/ECG: CPT

## 2025-06-19 ASSESSMENT — EXERCISE STRESS TEST
PEAK_RPE: 14
PEAK_METS: 10.2
PEAK_HR: 120

## 2025-06-20 ENCOUNTER — APPOINTMENT (OUTPATIENT)
Facility: HOSPITAL | Age: 70
End: 2025-06-20
Payer: MEDICARE

## 2025-06-20 ENCOUNTER — OFFICE VISIT (OUTPATIENT)
Age: 70
End: 2025-06-20
Payer: MEDICARE

## 2025-06-20 VITALS
SYSTOLIC BLOOD PRESSURE: 115 MMHG | BODY MASS INDEX: 33.03 KG/M2 | HEIGHT: 69 IN | HEART RATE: 86 BPM | WEIGHT: 223 LBS | DIASTOLIC BLOOD PRESSURE: 71 MMHG | OXYGEN SATURATION: 96 %

## 2025-06-20 DIAGNOSIS — Z95.5 HISTORY OF CORONARY ANGIOPLASTY WITH INSERTION OF STENT: Primary | ICD-10-CM

## 2025-06-20 DIAGNOSIS — E78.2 MIXED HYPERLIPIDEMIA: ICD-10-CM

## 2025-06-20 DIAGNOSIS — Z95.1 S/P CABG X 4: ICD-10-CM

## 2025-06-20 DIAGNOSIS — Z95.1 PRESENCE OF AORTOCORONARY BYPASS GRAFT: ICD-10-CM

## 2025-06-20 DIAGNOSIS — I48.0 PAROXYSMAL ATRIAL FIBRILLATION (HCC): ICD-10-CM

## 2025-06-20 PROCEDURE — 99214 OFFICE O/P EST MOD 30 MIN: CPT | Performed by: NURSE PRACTITIONER

## 2025-06-20 PROCEDURE — 1123F ACP DISCUSS/DSCN MKR DOCD: CPT | Performed by: NURSE PRACTITIONER

## 2025-06-20 ASSESSMENT — PATIENT HEALTH QUESTIONNAIRE - PHQ9
1. LITTLE INTEREST OR PLEASURE IN DOING THINGS: NOT AT ALL
SUM OF ALL RESPONSES TO PHQ QUESTIONS 1-9: 0
2. FEELING DOWN, DEPRESSED OR HOPELESS: NOT AT ALL
SUM OF ALL RESPONSES TO PHQ QUESTIONS 1-9: 0

## 2025-06-20 NOTE — PROGRESS NOTES
1. Have you been to the ER, urgent care clinic since your last visit?  Hospitalized since your last visit?     No    2. Have you seen or consulted any other health care providers outside of the [facility] since your last visit?  Include any pap smears or colon screening.      No     
is negative.  Dizziness is improving.  5/3/2024  Patient seen for complaint of chest pain and was recently in emergency room for similar complaint.  Will obtain nuclear stress test likely will need repeat cardiac cath.  Will continue current medications.  Including aspirin sublingual nitroglycerin as needed patient under significant stress as wife with terminal diagnosis and going home with hospice.  Advised to return to the emergency room for new or worsening symptoms  6/21/2024  Cardiac status is stable denies further episodes of chest pain nuclear stress test reviewed and discussed with patient negative for ischemia.  Will continue current medications will consider cardiac cath for new or worsening symptoms  12/20/2024  C/O increased myalgias and shortness of breath.  Will trial statin holiday x 2 weeks and evaluate LFT and myalgias.  Will obtain echo to follow LV function.  Will continue current medications.  F/U post testing - to ER for new or worsening symptoms.   1/24/2025  Complains of left-sided chest pain with shortness of breath worse with exertion.  Echo with normal LV function.  History of CAD status post CABG with PCI.  Will optimize antianginals continue beta-blocker Imdur aspirin and Plavix will add Ranexa 500 mg twice daily.  Myalgias have resolved and LFTs improved since discontinuing rosuvastatin will begin Repatha injection every 14 days.  Will discussed with interventionalists if patient would benefit from repeat cardiac cath.  3/7/2025  Significant cardiac history seen following cardiac cath  Successful balloon lithotripsy atherectomy, balloon angioplasty, and KETURAH of the Ostial/pRCA 99% to 0% utilizing 1.0x8mm, 2.5x12mm, 2.5x12mm lithotripsy, 3.0x8mm NC balloon with placement of [id number] KETURAH KEV III to KEV III flow  Since cardiac cath he reports great improvement in symptoms and improvement in shortness of breath which is now resolved he reports was not able to obtain Repatha from [facility]

## 2025-06-23 ENCOUNTER — HOSPITAL ENCOUNTER (OUTPATIENT)
Facility: HOSPITAL | Age: 70
Setting detail: RECURRING SERIES
Discharge: HOME | End: 2025-06-26
Payer: MEDICARE

## 2025-06-23 VITALS — BODY MASS INDEX: 32.7 KG/M2 | WEIGHT: 221.4 LBS

## 2025-06-23 PROCEDURE — 93798 PHYS/QHP OP CAR RHAB W/ECG: CPT

## 2025-06-23 ASSESSMENT — EXERCISE STRESS TEST
PEAK_HR: 118
PEAK_METS: 10.2
PEAK_RPE: 15

## 2025-06-25 ENCOUNTER — HOSPITAL ENCOUNTER (OUTPATIENT)
Facility: HOSPITAL | Age: 70
Setting detail: RECURRING SERIES
Discharge: HOME | End: 2025-06-28
Payer: MEDICARE

## 2025-06-25 VITALS — WEIGHT: 221.7 LBS | BODY MASS INDEX: 32.74 KG/M2

## 2025-06-25 PROCEDURE — 93798 PHYS/QHP OP CAR RHAB W/ECG: CPT

## 2025-06-25 ASSESSMENT — EXERCISE STRESS TEST
PEAK_RPE: 15
PEAK_HR: 120
PEAK_METS: 12.1

## 2025-06-27 ENCOUNTER — HOSPITAL ENCOUNTER (OUTPATIENT)
Facility: HOSPITAL | Age: 70
Setting detail: SPECIMEN
Discharge: HOME | End: 2025-06-30

## 2025-06-27 ENCOUNTER — HOSPITAL ENCOUNTER (OUTPATIENT)
Facility: HOSPITAL | Age: 70
Setting detail: RECURRING SERIES
Discharge: HOME | End: 2025-06-30
Payer: MEDICARE

## 2025-06-27 VITALS — WEIGHT: 220.6 LBS | BODY MASS INDEX: 32.58 KG/M2

## 2025-06-27 LAB — LABCORP SPECIMEN COLLECTION: NORMAL

## 2025-06-27 PROCEDURE — 99001 SPECIMEN HANDLING PT-LAB: CPT

## 2025-06-27 PROCEDURE — 93798 PHYS/QHP OP CAR RHAB W/ECG: CPT

## 2025-06-27 ASSESSMENT — EXERCISE STRESS TEST
PEAK_HR: 126
PEAK_METS: 12.1
PEAK_RPE: 14

## 2025-06-30 ENCOUNTER — HOSPITAL ENCOUNTER (OUTPATIENT)
Facility: HOSPITAL | Age: 70
Setting detail: RECURRING SERIES
Discharge: HOME | End: 2025-07-03
Payer: MEDICARE

## 2025-06-30 VITALS — BODY MASS INDEX: 32.49 KG/M2 | WEIGHT: 220 LBS

## 2025-06-30 PROCEDURE — 93798 PHYS/QHP OP CAR RHAB W/ECG: CPT

## 2025-06-30 ASSESSMENT — EXERCISE STRESS TEST
PEAK_METS: 12.1
PEAK_RPE: 16
PEAK_HR: 127

## 2025-07-01 LAB — SPECIMEN STATUS REPORT: NORMAL

## 2025-07-02 ENCOUNTER — HOSPITAL ENCOUNTER (OUTPATIENT)
Facility: HOSPITAL | Age: 70
Setting detail: RECURRING SERIES
Discharge: HOME | End: 2025-07-05
Payer: MEDICARE

## 2025-07-02 VITALS — WEIGHT: 220 LBS | BODY MASS INDEX: 32.49 KG/M2

## 2025-07-02 LAB
ALBUMIN SERPL-MCNC: 4.4 G/DL (ref 3.9–4.9)
ALP SERPL-CCNC: 86 IU/L (ref 44–121)
ALT SERPL-CCNC: 35 IU/L (ref 0–44)
AST SERPL-CCNC: 26 IU/L (ref 0–40)
BILIRUB SERPL-MCNC: 0.2 MG/DL (ref 0–1.2)
BUN SERPL-MCNC: 18 MG/DL (ref 8–27)
BUN/CREAT SERPL: 19 (ref 10–24)
CALCIUM SERPL-MCNC: 9.5 MG/DL (ref 8.6–10.2)
CHLORIDE SERPL-SCNC: 100 MMOL/L (ref 96–106)
CHOLEST SERPL-MCNC: 147 MG/DL (ref 100–199)
CO2 SERPL-SCNC: 17 MMOL/L (ref 20–29)
CREAT SERPL-MCNC: 0.94 MG/DL (ref 0.76–1.27)
EGFRCR SERPLBLD CKD-EPI 2021: 87 ML/MIN/1.73
GLOBULIN SER CALC-MCNC: 2.7 G/DL (ref 1.5–4.5)
GLUCOSE SERPL-MCNC: 146 MG/DL (ref 70–99)
HDLC SERPL-MCNC: 49 MG/DL
LDLC SERPL CALC-MCNC: 72 MG/DL (ref 0–99)
POTASSIUM SERPL-SCNC: 4.7 MMOL/L (ref 3.5–5.2)
PROT SERPL-MCNC: 7.1 G/DL (ref 6–8.5)
SODIUM SERPL-SCNC: 138 MMOL/L (ref 134–144)
TRIGL SERPL-MCNC: 150 MG/DL (ref 0–149)
VLDLC SERPL CALC-MCNC: 26 MG/DL (ref 5–40)

## 2025-07-02 PROCEDURE — 93798 PHYS/QHP OP CAR RHAB W/ECG: CPT

## 2025-07-02 ASSESSMENT — EXERCISE STRESS TEST
PEAK_HR: 120
PEAK_RPE: 15
PEAK_METS: 12.1

## 2025-07-03 ENCOUNTER — HOSPITAL ENCOUNTER (OUTPATIENT)
Facility: HOSPITAL | Age: 70
Setting detail: RECURRING SERIES
Discharge: HOME | End: 2025-07-06
Payer: MEDICARE

## 2025-07-03 VITALS — WEIGHT: 221 LBS | BODY MASS INDEX: 32.64 KG/M2

## 2025-07-03 PROCEDURE — 93798 PHYS/QHP OP CAR RHAB W/ECG: CPT

## 2025-07-03 ASSESSMENT — EXERCISE STRESS TEST
PEAK_METS: 3.47
PEAK_HR: 122
PEAK_RPE: 16

## 2025-07-03 ASSESSMENT — PATIENT HEALTH QUESTIONNAIRE - PHQ9
3. TROUBLE FALLING OR STAYING ASLEEP: NOT AT ALL
1. LITTLE INTEREST OR PLEASURE IN DOING THINGS: NOT AT ALL
9. THOUGHTS THAT YOU WOULD BE BETTER OFF DEAD, OR OF HURTING YOURSELF: NOT AT ALL
8. MOVING OR SPEAKING SO SLOWLY THAT OTHER PEOPLE COULD HAVE NOTICED. OR THE OPPOSITE, BEING SO FIGETY OR RESTLESS THAT YOU HAVE BEEN MOVING AROUND A LOT MORE THAN USUAL: NOT AT ALL
SUM OF ALL RESPONSES TO PHQ QUESTIONS 1-9: 0
5. POOR APPETITE OR OVEREATING: NOT AT ALL
SUM OF ALL RESPONSES TO PHQ QUESTIONS 1-9: 0
6. FEELING BAD ABOUT YOURSELF - OR THAT YOU ARE A FAILURE OR HAVE LET YOURSELF OR YOUR FAMILY DOWN: NOT AT ALL
4. FEELING TIRED OR HAVING LITTLE ENERGY: NOT AT ALL
7. TROUBLE CONCENTRATING ON THINGS, SUCH AS READING THE NEWSPAPER OR WATCHING TELEVISION: NOT AT ALL
2. FEELING DOWN, DEPRESSED OR HOPELESS: NOT AT ALL
10. IF YOU CHECKED OFF ANY PROBLEMS, HOW DIFFICULT HAVE THESE PROBLEMS MADE IT FOR YOU TO DO YOUR WORK, TAKE CARE OF THINGS AT HOME, OR GET ALONG WITH OTHER PEOPLE: NOT DIFFICULT AT ALL

## 2025-07-03 ASSESSMENT — NEW YORK HEART ASSOCIATION (NYHA) CLASSIFICATION: NYHA FUNCTIONAL CLASS: NO NYHA CLASS OR UNABLE TO DETERMINE

## 2025-07-03 NOTE — PROGRESS NOTES
Currently Taking Psychotropic Meds No   Medication Changes No   Psychosocial Education   Education Advanced directives;Attended class;Benefits of CPR completion;Cardiac meds;Coping techniques;Environmental triggers;Impact self care behaviors on health;Signs/symptoms of depression;Stress management class;Relaxation techniques   Psychosocial Goals   Patient Target Goals Assess presence or absence of depression using a valid screening tool;Demonstrates appropriate interaction with others;Engages in self-care behaviors;Maximizes coping skills;Positive support group   Goal Status Met   Tobacco Cessation Assessment   Stages of Change Maintenance   Type Cigarette   Tobacco Use Status Former (>6 months)   Quit Greater than 6 month   Date Quit   (stopped around age 21)   Nutrition Assessment   Stages of Change Maintenance   Nutrition Assessment Tool Rate Your Plate   Rate Your Plate Total Score 48   Current Diet daughter cooks dinner   Barriers to Heart Healthy Diet sweet tooth   Alcohol Yes   Type Beer   Amount (Drinks Per Week) rare   Height  1.753 m (5' 9\")   Weight  100.2 kg (221 lb)   BMI 32.7   Waist Circumference (In) 42\"   Other Core Component - Weight Management   BMI <18.5 or > 24.9, Managed as Core Component No   Other Core Component - Diabetes   Diabetes Yes   Diabetes Management Assessment   Stages of Change Maintenance   HgbA1c 9.0   HgbA1c Date 03/05/25   BG at Home Yes   BG Frequency 2-3/day   Continuous Glucose Monitoring No   BG in Range Yes   Random    Diabetes Management Interventions   Diabetes Med(s) Farxiga; metformin   Diabetes Med(s) Change No   Demonstrates Medication Compliance Yes   Diabetes Management Goals   Patient Target Goals HgA1c<7%;Fasting blood glucose 80-130mg/dl;Follow a carbohydrate controlled diet;Avoid drinking sugar-sweetened beverages;Adherence to and understanding of diabetes medication treatment plan   Patient Treatment Goal To stabalize BG by next recert, pt wants most

## 2025-07-07 ENCOUNTER — RESULTS FOLLOW-UP (OUTPATIENT)
Age: 70
End: 2025-07-07

## 2025-07-14 RX ORDER — AMOXICILLIN 500 MG/1
1000 CAPSULE ORAL DAILY
Qty: 6 CAPSULE | Refills: 0 | Status: CANCELLED | OUTPATIENT
Start: 2025-07-14

## 2025-07-14 RX ORDER — AMOXICILLIN 500 MG/1
500 CAPSULE ORAL DAILY
COMMUNITY

## 2025-07-14 NOTE — TELEPHONE ENCOUNTER
Requested Prescriptions     Pending Prescriptions Disp Refills    amoxicillin (AMOXIL) 500 MG capsule 6 capsule 0     Sig: Take 2 capsules by mouth daily

## 2025-07-15 NOTE — TELEPHONE ENCOUNTER
Called and left a message per Dr. Price he has no clinical indication for needing amoxicillin before dental procedure. I advised the patient to call back if need be and that we would be happy to explain or help in anyway we can.

## 2025-07-28 RX ORDER — EVOLOCUMAB 140 MG/ML
INJECTION, SOLUTION SUBCUTANEOUS
Qty: 5 ML | Refills: 4 | Status: SHIPPED | OUTPATIENT
Start: 2025-07-28

## 2025-08-04 ENCOUNTER — APPOINTMENT (OUTPATIENT)
Facility: HOSPITAL | Age: 70
End: 2025-08-04
Payer: MEDICARE

## 2025-08-11 ENCOUNTER — APPOINTMENT (OUTPATIENT)
Facility: HOSPITAL | Age: 70
End: 2025-08-11
Payer: MEDICARE

## 2025-08-18 ENCOUNTER — APPOINTMENT (OUTPATIENT)
Facility: HOSPITAL | Age: 70
End: 2025-08-18
Payer: MEDICARE

## 2025-08-25 ENCOUNTER — APPOINTMENT (OUTPATIENT)
Facility: HOSPITAL | Age: 70
End: 2025-08-25
Payer: MEDICARE

## 2025-09-01 DIAGNOSIS — I48.0 PAROXYSMAL ATRIAL FIBRILLATION (HCC): ICD-10-CM

## 2025-09-02 RX ORDER — ISOSORBIDE MONONITRATE 30 MG/1
30 TABLET, EXTENDED RELEASE ORAL DAILY
Qty: 100 TABLET | Refills: 3 | Status: SHIPPED | OUTPATIENT
Start: 2025-09-02

## 2025-09-08 ENCOUNTER — APPOINTMENT (OUTPATIENT)
Facility: HOSPITAL | Age: 70
End: 2025-09-08
Payer: MEDICARE

## 2025-09-15 ENCOUNTER — APPOINTMENT (OUTPATIENT)
Facility: HOSPITAL | Age: 70
End: 2025-09-15
Payer: MEDICARE

## 2025-09-22 ENCOUNTER — APPOINTMENT (OUTPATIENT)
Facility: HOSPITAL | Age: 70
End: 2025-09-22
Payer: MEDICARE

## 2025-09-29 ENCOUNTER — APPOINTMENT (OUTPATIENT)
Facility: HOSPITAL | Age: 70
End: 2025-09-29
Payer: MEDICARE

## (undated) DEVICE — FLUFF AND POLYMER UNDERPAD,EXTRA HEAVY: Brand: WINGS

## (undated) DEVICE — SET FLD ADMIN 3 W STPCOCK FIX FEM L BOR 1IN

## (undated) DEVICE — CANNULA ORIG TL CLR W FOAM CUSHIONS AND 14FT SUPL TB 3 CHN

## (undated) DEVICE — DECANTER BAG 9": Brand: MEDLINE INDUSTRIES, INC.

## (undated) DEVICE — CATH BLLN ANGIO 2.50X15MM SC EUPHORA RX

## (undated) DEVICE — GLIDESHEATH SLENDER STAINLESS STEEL KIT: Brand: GLIDESHEATH SLENDER

## (undated) DEVICE — CATHETER ANGIO 6FR L125CM FEM NYL JR 4 STD INFIN

## (undated) DEVICE — CATHETER ANGIO AD 6FR L100CM 0.038IN COR MP AMPLATZ 1

## (undated) DEVICE — COPILOT BLEEDBACK CONTROL VALVE: Brand: COPILOT

## (undated) DEVICE — RUNTHROUGH NS EXTRA FLOPPY PTCA GUIDEWIRE: Brand: RUNTHROUGH

## (undated) DEVICE — SNARE POLYP M W27MMXL240CM OVL STIFF DISP CAPTIVATOR

## (undated) DEVICE — GUIDEWIRE VASC L260CM DIA0.035IN RAD 3MM J TIP L7CM PTFE

## (undated) DEVICE — ADAPTER ANGIO CLR BODY POLYCARB AIRLESS ROT M 2ND FEM LUER

## (undated) DEVICE — MEDI-VAC NON-CONDUCTIVE SUCTION TUBING: Brand: CARDINAL HEALTH

## (undated) DEVICE — SYR 50ML SLIP TIP NSAF LF STRL --

## (undated) DEVICE — CATH BLLN ANGIO 2.50X12MM NC EUPHORIA RX

## (undated) DEVICE — ANGIOGRAPHY KIT CUST VASC

## (undated) DEVICE — DRAPE,ANGIO,BRACH,STERILE,38X44: Brand: MEDLINE

## (undated) DEVICE — 4FR MICROPUNCTURE KIT STIFFEN

## (undated) DEVICE — HI-TORQUE BALANCE MIDDLEWEIGHT UNIVERSAL GUIDE WIRE .014 STRAIGHT TIP 3.0 CM X 190 CM: Brand: HI-TORQUE BALANCE MIDDLEWEIGHT UNIVERSAL

## (undated) DEVICE — PROCEDURE KIT FLUID MGMT 10 FR CUST MAINFOLD

## (undated) DEVICE — ENDOSCOPY PUMP TUBING/ CAP SET: Brand: ERBE

## (undated) DEVICE — TOWEL,OR,DSP,ST,BLUE,STD,4/PK,20PK/CS: Brand: MEDLINE

## (undated) DEVICE — CATHETER DIAG AD L100CM DIA6FR STD JUDKINS L 4 POLYUR COR

## (undated) DEVICE — RADIFOCUS GLIDEWIRE: Brand: GLIDEWIRE

## (undated) DEVICE — CATHETER IVL C2PLUS SHOCKWAVE 2.5MM X 12MM

## (undated) DEVICE — PACK PROCEDURE SURG VASC CATH 161 MMC LF

## (undated) DEVICE — CATH BLLN ANGIO 3X8MM NC EUPHORIA RX

## (undated) DEVICE — SUPPORT WRST COMPR W/ HK MBRACE

## (undated) DEVICE — COVER US PRB W15XL120CM W/ GEL RUBBERBAND TAPE STRP FLD GEN

## (undated) DEVICE — PRESSURE MONITORING SET: Brand: TRUWAVE

## (undated) DEVICE — CATHETER ANGIOPLSTY SAPPHIRE 1X8 BAL PRO OTW

## (undated) DEVICE — CATHETER DIAG AD 5FR L110CM 145DEG COR GRY HYDRPHLC NYL

## (undated) DEVICE — SYR 10ML LUER LOK 1/5ML GRAD --

## (undated) DEVICE — Device

## (undated) DEVICE — Device: Brand: EAGLE EYE PLATINUM RX DIGITAL IVUS CATHETER

## (undated) DEVICE — AIRLIFE™ NASAL OXYGEN CANNULA CURVED, NONFLARED TIP WITH 14 FOOT (4.3 M) CRUSH-RESISTANT TUBING, OVER-THE-EAR STYLE: Brand: AIRLIFE™

## (undated) DEVICE — CATH BLLN ANGIO 2X15MM SC EUPHORA RX

## (undated) DEVICE — BAG WST COLL CLR DISP PVC W/ ROTICULATING LUER L BOR TBNG

## (undated) DEVICE — INTRODUCER SHTH 6FR CANN L11CM DIL TIP 35MM GRN TUNGSTEN

## (undated) DEVICE — MEDI-VAC SUCTION HIGH CAPACITY: Brand: CARDINAL HEALTH

## (undated) DEVICE — CATHETER ANGIO IMA 038 AD 6 FRX100 CM SUPER TORQUE +

## (undated) DEVICE — ANGIO-SEAL VIP VASCULAR CLOSURE DEVICE: Brand: ANGIO-SEAL

## (undated) DEVICE — SYRINGE MED 25GA 3ML L5/8IN SUBQ PLAS W/ DETACH NDL SFTY

## (undated) DEVICE — FLEX ADVANTAGE 3000CC: Brand: FLEX ADVANTAGE

## (undated) DEVICE — DEVICE INFL W ACCS + HEMSTAS VLV INSRT TOOL AND TORQ BASIX

## (undated) DEVICE — GOWN ISOL IMPERV UNIV, DISP, OPEN BACK, BLUE --

## (undated) DEVICE — GAUZE,SPONGE,4"X4",16PLY,STRL,LF,10/TRAY: Brand: MEDLINE

## (undated) DEVICE — CATHETER ANGIO JR4 STD 0.038 IN 6 FRX100 CM SUPER TORQUE +

## (undated) DEVICE — BAND COMPR L24CM REG CLR PLAS HEMSTAT EXT HK AND LOOP RETEN

## (undated) DEVICE — CATHETER SUCT TR FL TIP 14FR W/ O CTRL

## (undated) DEVICE — LUER-LOK 360°: Brand: CONNECTA, LUER-LOK

## (undated) DEVICE — SYR 20ML LL STRL LF --

## (undated) DEVICE — CATHETER GUID 6FR DIA0.071IN SHFT NYL STD L JR 4 CRV ENH

## (undated) DEVICE — SOLUTION IRRIG 1000ML H2O STRL BLT

## (undated) DEVICE — CATHETER ANGIO 5FR L100CM GRY S STL NYL JR4 3 SEG BRAID L

## (undated) DEVICE — CATHETER ANGIO 4FR L100CM S STL NYL IM 3 SEG BRAID SFT

## (undated) DEVICE — PADS  DEFIB  ADULT

## (undated) DEVICE — CATHETER DIAG 5FR L100CM LUMN ID0.047IN JL3.5 CRV 0 SIDE H

## (undated) DEVICE — EXTENSION SET, MALE LUER LOCK ADAPTER

## (undated) DEVICE — CATH BLLN ANGIO 2.75X15MM NC EUPHORIA RX

## (undated) DEVICE — CATHETER THOR 36FR DIA10.7MM POLYVI CHL TRCR TIP STR SFT

## (undated) DEVICE — CATHETER GUID 6FR 0.071IN COR STD JUDKINS R 4 SIDE H MID